# Patient Record
Sex: FEMALE | Race: BLACK OR AFRICAN AMERICAN | Employment: FULL TIME | URBAN - METROPOLITAN AREA
[De-identification: names, ages, dates, MRNs, and addresses within clinical notes are randomized per-mention and may not be internally consistent; named-entity substitution may affect disease eponyms.]

---

## 2017-02-14 ENCOUNTER — OFFICE VISIT (OUTPATIENT)
Dept: NEUROLOGY | Age: 42
End: 2017-02-14

## 2017-02-14 VITALS
DIASTOLIC BLOOD PRESSURE: 78 MMHG | BODY MASS INDEX: 23.63 KG/M2 | OXYGEN SATURATION: 98 % | SYSTOLIC BLOOD PRESSURE: 128 MMHG | HEART RATE: 108 BPM | HEIGHT: 62 IN | WEIGHT: 128.4 LBS

## 2017-02-14 DIAGNOSIS — G35 MULTIPLE SCLEROSIS (HCC): Primary | ICD-10-CM

## 2017-02-14 DIAGNOSIS — R29.898 RIGHT LEG WEAKNESS: ICD-10-CM

## 2017-02-14 NOTE — PROGRESS NOTES
Date:             2017    Name:  Magnus Medel  :  1975  MRN:  [id number]     PCP:  Carmine Lott NP    Chief Complaint   Patient presents with    Follow-up     MS         HISTORY OF PRESENT ILLNESS:  Zakia Villarreal is a 39 y.o., female who presents today for follow up for MS. She had a bad reaction to copaxone, which was the first DMT that she tried. She is now on rebif, and is really happy with that. No injection pain, she has a little tingle about 5 minutes later but that's it. No systemic side effects to speak off. She feels like she has noticed a change in her ability to walk, she is no longer excessively fatigued. Weakness in her right leg has stabilized, this had started to hurt when walking but she is noticing that less now. Her eyesight has changed, she is due to have an eye exam on Monday. She has some diplopia. She has started doing yoga at home, [employer] sent her a DVD with yoga on ever people with MS. Overall, her mood is good but she did snap at her dad recently. She has started meditating, has a friend at work who is helping her with that.    2016 recap  Zakia Villarreal is a 39 y.o., female who presents today for follow up for MS. She initially considered taking a PO medicine, but is not comfortable with the PML risk. She is comfortable now starting copaxone. She is not enthusiastic about the injections, but thinks that she is ready. Does still feel like she has a glove on the left side of her body. This has been present since her first MS attack in her 25s, no MRI was done at that time. She had an EMG only. Returned when she developed right leg weakness, worst when she is tired. She does make sure to rest when she needs to. She does feel like she tends to head to the left when she walks. Did start falling last year, which is why she pursued further workup. Did have a severe HA after her LP, after she got a blood patch she felt immediately better.  Denies visual changes, but thinks she might need new glasses. Current Outpatient Prescriptions   Medication Sig    interferon beta-1a, albumin, (REBIF, WITH ALBUMIN,) 44 mcg/0.5 mL injection by SubCUTAneous route every Monday, Wednesday, Friday.  norethindrone-ethinyl estradiol-iron (JUNEL FE 1.5/30, 28,) 1.5 mg-30 mcg (21)/75 mg (7) tablet Take 1 Tab by mouth daily.  LORATADINE (CLARITIN PO) Take 10 mg by mouth as needed. No current facility-administered medications for this visit. No Known Allergies  Past Medical History   Diagnosis Date    Ill-defined condition      uterine fibroids    Other ill-defined conditions(799.89)      sleep disorder and breast fibroids    Other ill-defined conditions(799.89)      left arm numbness at times    Unspecified breast disorder      Past Surgical History   Procedure Laterality Date    Hx heent       tooth extraction    Pr abdomen surgery proc unlisted       myomectomy    Hx gyn       fibroids    Hx other surgical  2015     laparoscopoy      Social History     Social History    Marital status: SINGLE     Spouse name: N/A    Number of children: N/A    Years of education: N/A     Occupational History    Not on file.      Social History Main Topics    Smoking status: Never Smoker    Smokeless tobacco: Never Used    Alcohol use Yes      Comment: occasionally    Drug use: Yes     Special: Marijuana      Comment: \"hasn't been this week\"    Sexual activity: Not on file     Other Topics Concern    Not on file     Social History Narrative     Family History   Problem Relation Age of Onset    Bleeding Prob Mother     Endometriosis Mother     Cancer Maternal Grandmother      lung    Hypertension Maternal Grandfather     Stroke Maternal Grandfather          PHYSICAL EXAMINATION:    Visit Vitals    /78    Pulse (!) 108    Ht 5' 2\" (1.575 m)    Wt 128 lb 6.4 oz (58.2 kg)    SpO2 98%    BMI 23.48 kg/m2     General:  Well defined, nourished, and groomed individual in no acute distress. Neck: Supple, nontender, no bruits, no pain with resistance to active range of motion. Heart: Regular rate and rhythm, no murmurs, rub, or gallop. Normal S1S2. Lungs:  Clear to auscultation bilaterally with equal chest expansion, no cough, no wheeze  Musculoskeletal:  Extremities revealed no edema and had full range of motion of joints. Psych:  Good mood and bright affect    NEUROLOGICAL EXAMINATION:     Mental Status:   Alert and oriented to person, place, and time with recent and remote memory intact. Attention span and concentration are normal. Speech is fluent with a full fund of knowledge. Cranial Nerves:    II, III, IV, VI:  Visual acuity grossly intact. Pupils are equal, round, and reactive to light. Extra-ocular movements are full and fluid. No ptosis or nystagmus. V-XII: Hearing is grossly intact. Facial features are symmetric, with normal sensation and strength. The palate rises symmetrically and the tongue protrudes midline. Sternocleidomastoids 5/5. Motor Examination: Normal tone, bulk, and strength, 5/5 muscle strength throughout (right leg slightly weaker than left). Coordination:  Finger to nose testing was normal.   No resting or intention tremor  Gait and Station:  Steady while walking and with tandem walking. Normal arm swing. No pronator drift. No muscle wasting or fasciculations noted. ASSESSMENT AND PLAN    ICD-10-CM ICD-9-CM    1. Multiple sclerosis ([facility] G35 340    2. Right leg weakness M62.81 729.89      70-year-old female seen in follow-up for MS. She recently initiated DMT. First try Copaxone, had terrible reactions to that. She is now using reactive, is tolerating that very well. She has a little bit of tingling a few minutes after the injection, but no systemic reaction. Labs last checked the beginning of January were within normal limits.   She is doing yoga, meditation, think about getting an exercise bike to improve her strength and her balance. Has a little bit of right leg weakness, this does not affect her ability to walk. Also has some diplopia and has a plan to get an eye exam on Monday. She thinks that she sees an ophthalmologist for that. 1.  Continue rebif 44 mcg M, W, F  2.  Patient will return in 6 months, discussed that she should get repeat imaging between 6 months and 1 year after initiating Rebif to evaluate for effectiveness. Will also be due for CMP P and CBC at that time  3. Advised her to continue working on balance, strength with her exercise. Patient is also doing a great job of maintaining hydration, should continue that.   4.  Eye exam is normal and she continues to have diplopia, will request records and send her to an ophthalmologist if she has not seen one    Follow-up in 6 months, patient would like to establish care with Dr. Wilhemenia Schlatter NP

## 2017-02-14 NOTE — MR AVS SNAPSHOT
Visit Information Date & Time Provider Department Dept. Phone Encounter #  
 2/14/2017  8:00 AM Ashlyn Delacruz NP [facility] [phone] [id number] Follow-up Instructions Return in about 6 months (around 8/14/2017). Your Appointments 8/14/2017  9:40 AM  
Follow Up with Franko Naik MD  
[facility] [address]) Appt Note: FU, MS,CP $40,adt,2/14/2017  
 [facility], 
[address], [address] [address]  
[address], [address], [address] [address] Upcoming Health Maintenance Date Due DTaP/Tdap/Td series (1 - Tdap) 8/22/1996 PAP AKA CERVICAL CYTOLOGY 8/22/1996 INFLUENZA AGE 9 TO ADULT 8/1/2016 Allergies as of 2/14/2017  Review Complete On: 2/14/2017 By: Sharona Palacios No Known Allergies Current Immunizations  Never Reviewed No immunizations on file. Not reviewed this visit You Were Diagnosed With   
  
 Codes Comments Multiple sclerosis ([facility]    -  Primary ICD-10-CM: G35 
ICD-9-CM: 592 Right leg weakness     ICD-10-CM: M62.81 ICD-9-CM: 729.89 Vitals BP Pulse Height(growth percentile) Weight(growth percentile) LMP SpO2  
 128/78 (!) 108 5' 2\" (1.575 m) 128 lb 6.4 oz (58.2 kg) 02/06/2017 98% BMI OB Status Smoking Status 23.48 kg/m2 Having regular periods Never Smoker BMI and BSA Data Body Mass Index Body Surface Area  
 23.48 kg/m 2 1.6 m 2 Preferred Pharmacy Pharmacy Name Phone [id number] [place], [address] Your Updated Medication List  
  
   
This list is accurate as of: 2/14/17  8:31 AM.  Always use your most recent med list.  
  
  
  
  
 Harshal Dano Take 10 mg by mouth as needed. JUNEL FE 1.5/30 (28) 1.5 mg-30 mcg (21)/75 mg (7) Tab Generic drug:  norethindrone-ethinyl estradiol-iron Take 1 Tab by mouth daily. REBIF (WITH ALBUMIN) 44 mcg/0.5 mL injection Generic drug:  interferon beta-1a (albumin) by SubCUTAneous route every Monday, Wednesday, Friday. Follow-up Instructions Return in about 6 months (around 8/14/2017). Patient Instructions A Healthy Lifestyle: Care Instructions Your Care Instructions A healthy lifestyle can help you feel good, stay at a healthy weight, and have plenty of energy for both work and play. A healthy lifestyle is something you can share with your whole family. A healthy lifestyle also can lower your risk for serious health problems, such as high blood pressure, heart disease, and diabetes. You can follow a few steps listed below to improve your health and the health of your family. Follow-up care is a key part of your treatment and safety. Be sure to make and go to all appointments, and call your doctor if you are having problems. Its also a good idea to know your test results and keep a list of the medicines you take. How can you care for yourself at home? · Do not eat too much sugar, fat, or fast foods. You can still have dessert and treats now and then. The goal is moderation. · Start small to improve your eating habits. Pay attention to portion sizes, drink less juice and soda pop, and eat more fruits and vegetables. ¨ Eat a healthy amount of food. A 3-ounce serving of meat, for example, is about the size of a deck of cards. Fill the rest of your plate with vegetables and whole grains. ¨ Limit the amount of soda and sports drinks you have every day. Drink more water when you are thirsty. ¨ Eat at least 5 servings of fruits and vegetables every day. It may seem like a lot, but it is not hard to reach this goal. A serving or helping is 1 piece of fruit, 1 cup of vegetables, or 2 cups of leafy, raw vegetables. Have an apple or some carrot sticks as an afternoon snack instead of a candy bar. Try to have fruits and/or vegetables at every meal. 
· Make exercise part of your daily routine. You may want to start with simple activities, such as walking, bicycling, or slow swimming. Try to be active 30 to 60 minutes every day. You do not need to do all 30 to 60 minutes all at once. For example, you can exercise 3 times a day for 10 or 20 minutes. Moderate exercise is safe for most people, but it is always a good idea to talk to your doctor before starting an exercise program. 
· Keep moving. Gomez Mathews the lawn, work in the garden, or [place]. Take the stairs instead of the elevator at work. · If you smoke, quit. People who smoke have an increased risk for heart attack, stroke, cancer, and other lung illnesses. Quitting is hard, but there are ways to boost your chance of quitting tobacco for good. ¨ Use nicotine gum, patches, or lozenges. ¨ Ask your doctor about stop-smoking programs and medicines. ¨ Keep trying. In addition to reducing your risk of diseases in the future, you will notice some benefits soon after you stop using tobacco. If you have shortness of breath or asthma symptoms, they will likely get better within a few weeks after you quit. · Limit how much alcohol you drink. Moderate amounts of alcohol (up to 2 drinks a day for men, 1 drink a day for women) are okay. But drinking too much can lead to liver problems, high blood pressure, and other health problems. Family health If you have a family, there are many things you can do together to improve your health. · Eat meals together as a family as often as possible. · Eat healthy foods. This includes fruits, vegetables, lean meats and dairy, and whole grains. · Include your family in your fitness plan.  Most people think of activities such as jogging or tennis as the way to fitness, but there are many ways you and your family can be more active. Anything that makes you breathe hard and gets your heart pumping is exercise. Here are some tips: 
¨ Walk to do errands or to take your child to school or the bus. ¨ Go for a family bike ride after dinner instead of watching TV. Where can you learn more? Go to http://juan jose-rolf.info/. Enter E893 in the search box to learn more about \"A Healthy Lifestyle: Care Instructions. \" Current as of: July 26, 2016 Content Version: 11.1 © 7177-1080 [employer]. Care instructions adapted under license by [employer] (which disclaims liability or warranty for this information). If you have questions about a medical condition or this instruction, always ask your healthcare professional. [address] any warranty or liability for your use of this information. Introducing [facility] & HEALTH SERVICES! Dear Tisha Alvarado: Thank you for requesting a [facility] account. Our records indicate that you already have an active [facility] account. You can access your account anytime at [email or web address]://[email or web address]. [employer] Did you know that you can access your hospital and ER discharge instructions at any time in [facility]? You can also review all of your test results from your hospital stay or ER visit. Additional Information If you have questions, please visit the Frequently Asked Questions section of the [facility] website at [email or web address]://[email or web address]. [employer]/. Remember, [facility] is NOT to be used for urgent needs. For medical emergencies, dial 911. Now available from your iPhone and Android! Please provide this summary of care documentation to your next provider. Your primary care clinician is listed as Inge Lott. If you have any questions after today's visit, please call [phone].

## 2017-02-14 NOTE — PATIENT INSTRUCTIONS

## 2017-04-12 ENCOUNTER — DOCUMENTATION ONLY (OUTPATIENT)
Dept: NEUROLOGY | Age: 42
End: 2017-04-12

## 2017-04-12 ENCOUNTER — TELEPHONE (OUTPATIENT)
Dept: NEUROLOGY | Age: 42
End: 2017-04-12

## 2017-07-11 ENCOUNTER — HOSPITAL ENCOUNTER (OUTPATIENT)
Dept: MAMMOGRAPHY | Age: 42
Discharge: HOME | End: 2017-07-11
Attending: OBSTETRICS & GYNECOLOGY
Payer: COMMERCIAL

## 2017-07-11 DIAGNOSIS — Z12.31 VISIT FOR SCREENING MAMMOGRAM: ICD-10-CM

## 2017-07-11 PROCEDURE — 77063 BREAST TOMOSYNTHESIS BI: CPT

## 2017-08-14 ENCOUNTER — OFFICE VISIT (OUTPATIENT)
Dept: NEUROLOGY | Age: 42
End: 2017-08-14

## 2017-08-14 VITALS
SYSTOLIC BLOOD PRESSURE: 110 MMHG | OXYGEN SATURATION: 99 % | BODY MASS INDEX: 23.96 KG/M2 | WEIGHT: 130.2 LBS | DIASTOLIC BLOOD PRESSURE: 72 MMHG | HEIGHT: 62 IN | HEART RATE: 81 BPM

## 2017-08-14 DIAGNOSIS — G35 MULTIPLE SCLEROSIS (HCC): Primary | ICD-10-CM

## 2017-08-14 DIAGNOSIS — R20.0 NUMBNESS ON LEFT SIDE: ICD-10-CM

## 2017-08-14 DIAGNOSIS — R29.898 RIGHT LEG WEAKNESS: ICD-10-CM

## 2017-08-14 RX ORDER — ERGOCALCIFEROL 1.25 MG/1
50000 CAPSULE ORAL
COMMUNITY
End: 2019-06-03 | Stop reason: ALTCHOICE

## 2017-08-14 NOTE — LETTER
8/14/2017 10:26 AM 
 
Patient:    Wiley Nobles YOB: 1975 Date of Visit:    8/14/2017 Dear Geovanny Escamilla MD 
 
Thank you for referring Ms. Beto Gutierrez to me for evaluation/treatment. Below are the relevant portions of my assessment and plan of care. Neurology follow-up note 08/14/17 Chief Complaint Patient presents with  Follow-up MS NEGRITO Nobles is a 39 y.o. female who presented to the neurology office for management of multiple sclerosis. At the age of 29 she did have left-sided numbness that had been improving and now she does have decreased sensation in her left hand. She was diagnosed with multiple sclerosis in November 2016 as at that time she did have sudden onset right leg weakness and it gets weak and fatigued. In January she did have diplopia. All the symptoms are better now. She initially tried Copaxone for 2 weeks but had hot flashes and was feeling sick and hence was switched over to Rebif December/January and has been doing better. No recent exacerbations. Current Outpatient Prescriptions Medication Sig  ergocalciferol (VITAMIN D2) 50,000 unit capsule Take 50,000 Units by mouth.  interferon beta-1a, albumin, (REBIF, WITH ALBUMIN,) 44 mcg/0.5 mL injection by SubCUTAneous route every Monday, Wednesday, Friday.  norethindrone-ethinyl estradiol-iron (JUNEL FE 1.5/30, 28,) 1.5 mg-30 mcg (21)/75 mg (7) tablet Take 1 Tab by mouth daily.  LORATADINE (CLARITIN PO) Take 10 mg by mouth as needed. No current facility-administered medications for this visit. Past Medical History:  
Diagnosis Date  Ill-defined condition   
 uterine fibroids  Other ill-defined conditions   
 sleep disorder and breast fibroids  Other ill-defined conditions   
 left arm numbness at times  Unspecified breast disorder Past Surgical History:  
Procedure Laterality Date  ABDOMEN SURGERY PROC UNLISTED    
 myomectomy  HX GYN    
 fibroids  HX HEENT    
 tooth extraction  HX OTHER SURGICAL  2015  
 laparoscopoy Family History Problem Relation Age of Onset  Bleeding Prob Mother  Endometriosis Mother  Cancer Maternal Grandmother   
  lung  Hypertension Maternal Grandfather  Stroke Maternal Grandfather Social History Substance Use Topics  Smoking status: Never Smoker  Smokeless tobacco: Never Used  Alcohol use Yes Comment: occasionally REVIEW OF SYSTEMS:  
A ten system review of constitutional, cardiovascular, respiratory, musculoskeletal, endocrine, skin, SHEENT, genitourinary, psychiatric and neurologic systems was obtained and is unremarkable except left-sided numbness and right leg weakness EXAMINATION:  
Visit Vitals  /72  Pulse 81  
 Ht 5' 2\" (1.575 m)  Wt 130 lb 3.2 oz (59.1 kg)  SpO2 99%  BMI 23.81 kg/m2 General:  
General appearance: Pt is in no acute distress Distal pulses are preserved Neurological Examination:  
Mental Status: AAO x3. Speech is fluent. Follows commands, has normal fund of knowledge, attention, short term recall, comprehension and insight. Cranial Nerves: Visual fields are full. PERRL, Extraocular movements are full. Facial sensation intact V1- V3. Facial movement intact, symmetric. Hearing intact to conversation. Palate elevates symmetrically. Shoulder shrug symmetric. Tongue midline. Motor: Strength is 5/5 in all 4 ext. Sensation: Decreased pinprick sensation distally in the left upper and lower extremities Coordination/Cerebellar: Intact to finger-nose-finger Skin: No significant bruising or lacerations. Laboratory review:  
Results for orders placed or performed in visit on 12/23/16 CBC WITH AUTOMATED DIFF Result Value Ref Range WBC 5.9 3.4 - 10.8 x10E3/uL  
 RBC 4.32 3.77 - 5.28 x10E6/uL HGB 13.5 11.1 - 15.9 g/dL HCT 40.9 34.0 - 46.6 %  MCV 95 79 - 97 fL  
 MCH 31.3 26.6 - 33.0 pg  
 MCHC 33.0 31.5 - 35.7 g/dL  
 RDW 13.4 12.3 - 15.4 % PLATELET 907 932 - 644 x10E3/uL NEUTROPHILS 53 % Lymphocytes 37 % MONOCYTES 8 % EOSINOPHILS 1 % BASOPHILS 1 %  
 ABS. NEUTROPHILS 3.2 1.4 - 7.0 x10E3/uL Abs Lymphocytes 2.2 0.7 - 3.1 x10E3/uL  
 ABS. MONOCYTES 0.5 0.1 - 0.9 x10E3/uL  
 ABS. EOSINOPHILS 0.1 0.0 - 0.4 x10E3/uL  
 ABS. BASOPHILS 0.0 0.0 - 0.2 x10E3/uL IMMATURE GRANULOCYTES 0 %  
 ABS. IMM. GRANS. 0.0 0.0 - 0.1 x10E3/uL METABOLIC PANEL, COMPREHENSIVE Result Value Ref Range Glucose 79 65 - 99 mg/dL BUN 14 6 - 24 mg/dL Creatinine 0.64 0.57 - 1.00 mg/dL GFR est non- >59 mL/min/1.73 GFR est  >59 mL/min/1.73  
 BUN/Creatinine ratio 22 9 - 23 Sodium 142 134 - 144 mmol/L Potassium 4.8 3.5 - 5.2 mmol/L Chloride 101 96 - 106 mmol/L  
 CO2 27 18 - 29 mmol/L Calcium 9.7 8.7 - 10.2 mg/dL Protein, total 7.4 6.0 - 8.5 g/dL Albumin 4.3 3.5 - 5.5 g/dL GLOBULIN, TOTAL 3.1 1.5 - 4.5 g/dL A-G Ratio 1.4 1.1 - 2.5 Bilirubin, total 0.6 0.0 - 1.2 mg/dL Alk. phosphatase 55 39 - 117 IU/L  
 AST (SGOT) 15 0 - 40 IU/L  
 ALT (SGPT) 11 0 - 32 IU/L Imaging review: 
10/4/2016 MRI thoracic spine with and without contrast 
Multifocal thoracic cord signal alteration without evidence for cord enhancement. Lower cervical spine degenerative disc disease. No thoracic spine degenerative disc disease or facet arthrosis. 7/13/2016 MRI cervical spine with and without contrast 
Abnormal T2 signal in the normal caliber cervical spinal cord spanning C1 through C7 and is compatible with provided diagnosis of chronic demyelinating disease. No MRI evidence of active demyelination. Documentation review: I reviewed the notes from Jeff Kellogg from 2/14/2017. Patient is taking ibuprofen at this point of time and will continue the same.   The patient will return in 6 months and will discuss repeat imaging between 6 months and a year after initiating Requip to evaluate for effectiveness. Advised to continue on work, strength and exercise. Assessment/Plan:  
Rad Gomez is a 39 y.o. female who presented to the neurology office for management of multiple sclerosis. The patient is doing well on treatment that she started around January of this year. The patient was on Copaxone before but had a reaction. No new exacerbation. Will get blood work today and we will get imaging study of the brain and spine to look for any progression. 1.  Blood work today 2. MRI brain, cervical and thoracic spine 3. Follow-up in 3 months ICD-10-CM ICD-9-CM 1. Multiple sclerosis ([address] G35 340 MRI BRAIN W WO CONT  
   MRI CERV SPINE W WO CONT  
   MRI [facility] SPINE W WO CONT  
   CBC WITH AUTOMATED DIFF  
   METABOLIC PANEL, COMPREHENSIVE 2. Right leg weakness R29.898 729.89   
3. Numbness on left side R20.0 782.0 Brittany Rolon MD 
Neurologist 
 
CC: Ailyn Garrison MD 
Fax: [phone] This note was created using voice recognition software. Despite editing, there may be syntax errors. This note will not be viewable in [address] If you have questions, please do not hesitate to call me. I look forward to following MsBashir Katerina Weiss along with you. Sincerely, Brittany Rolon MD

## 2017-08-14 NOTE — PATIENT INSTRUCTIONS
1.  Blood work today  2. MRI brain, cervical and thoracic spine  3. Follow-up in 3 months     A Healthy Lifestyle: Care Instructions  Your Care Instructions  A healthy lifestyle can help you feel good, stay at a healthy weight, and have plenty of energy for both work and play. A healthy lifestyle is something you can share with your whole family. A healthy lifestyle also can lower your risk for serious health problems, such as high blood pressure, heart disease, and diabetes. You can follow a few steps listed below to improve your health and the health of your family. Follow-up care is a key part of your treatment and safety. Be sure to make and go to all appointments, and call your doctor if you are having problems. Its also a good idea to know your test results and keep a list of the medicines you take. How can you care for yourself at home? · Do not eat too much sugar, fat, or fast foods. You can still have dessert and treats now and then. The goal is moderation. · Start small to improve your eating habits. Pay attention to portion sizes, drink less juice and soda pop, and eat more fruits and vegetables. ¨ Eat a healthy amount of food. A 3-ounce serving of meat, for example, is about the size of a deck of cards. Fill the rest of your plate with vegetables and whole grains. ¨ Limit the amount of soda and sports drinks you have every day. Drink more water when you are thirsty. ¨ Eat at least 5 servings of fruits and vegetables every day. It may seem like a lot, but it is not hard to reach this goal. A serving or helping is 1 piece of fruit, 1 cup of vegetables, or 2 cups of leafy, raw vegetables. Have an apple or some carrot sticks as an afternoon snack instead of a candy bar. Try to have fruits and/or vegetables at every meal.  · Make exercise part of your daily routine. You may want to start with simple activities, such as walking, bicycling, or slow swimming.  Try to be active 30 to 60 minutes every day. You do not need to do all 30 to 60 minutes all at once. For example, you can exercise 3 times a day for 10 or 20 minutes. Moderate exercise is safe for most people, but it is always a good idea to talk to your doctor before starting an exercise program.  · Keep moving. [employer] the lawn, work in the garden, or [employer]. Take the stairs instead of the elevator at work. · If you smoke, quit. People who smoke have an increased risk for heart attack, stroke, cancer, and other lung illnesses. Quitting is hard, but there are ways to boost your chance of quitting tobacco for good. ¨ Use nicotine gum, patches, or lozenges. ¨ Ask your doctor about stop-smoking programs and medicines. ¨ Keep trying. In addition to reducing your risk of diseases in the future, you will notice some benefits soon after you stop using tobacco. If you have shortness of breath or asthma symptoms, they will likely get better within a few weeks after you quit. · Limit how much alcohol you drink. Moderate amounts of alcohol (up to 2 drinks a day for men, 1 drink a day for women) are okay. But drinking too much can lead to liver problems, high blood pressure, and other health problems. Family health  If you have a family, there are many things you can do together to improve your health. · Eat meals together as a family as often as possible. · Eat healthy foods. This includes fruits, vegetables, lean meats and dairy, and whole grains. · Include your family in your fitness plan. Most people think of activities such as jogging or tennis as the way to fitness, but there are many ways you and your family can be more active. Anything that makes you breathe hard and gets your heart pumping is exercise. Here are some tips:  ¨ Walk to do errands or to take your child to school or the bus. ¨ Go for a family bike ride after dinner instead of watching TV. Where can you learn more? Go to http://juan jose-rolf.info/.   Enter C009 in the search box to learn more about \"A Healthy Lifestyle: Care Instructions. \"  Current as of: July 26, 2016  Content Version: 11.3  © 9427-0572 [employer], [email or web address]. Care instructions adapted under license by [employer] (which disclaims liability or warranty for this information). If you have questions about a medical condition or this instruction, always ask your healthcare professional. [place] [address] any warranty or liability for your use of this information.

## 2017-08-14 NOTE — PROGRESS NOTES
Neurology follow-up note    08/14/17    Chief Complaint   Patient presents with   Jamshid Kendall is a 39 y.o. female who presented to the neurology office for management of multiple sclerosis. At the age of 29 she did have left-sided numbness that had been improving and now she does have decreased sensation in her left hand. She was diagnosed with multiple sclerosis in November 2016 as at that time she did have sudden onset right leg weakness and it gets weak and fatigued. In January she did have diplopia. All the symptoms are better now. She initially tried Copaxone for 2 weeks but had hot flashes and was feeling sick and hence was switched over to Rebif December/January and has been doing better. No recent exacerbations. Current Outpatient Prescriptions   Medication Sig    ergocalciferol (VITAMIN D2) 50,000 unit capsule Take 50,000 Units by mouth.  interferon beta-1a, albumin, (REBIF, WITH ALBUMIN,) 44 mcg/0.5 mL injection by SubCUTAneous route every Monday, Wednesday, Friday.  norethindrone-ethinyl estradiol-iron (JUNEL FE 1.5/30, 28,) 1.5 mg-30 mcg (21)/75 mg (7) tablet Take 1 Tab by mouth daily.  LORATADINE (CLARITIN PO) Take 10 mg by mouth as needed. No current facility-administered medications for this visit.         Past Medical History:   Diagnosis Date    Ill-defined condition     uterine fibroids    Other ill-defined conditions     sleep disorder and breast fibroids    Other ill-defined conditions     left arm numbness at times    Unspecified breast disorder      Past Surgical History:   Procedure Laterality Date    ABDOMEN SURGERY PROC UNLISTED      myomectomy    HX GYN      fibroids    HX HEENT      tooth extraction    HX OTHER SURGICAL  2015    laparoscopoy      Family History   Problem Relation Age of Onset    Bleeding Prob Mother     Endometriosis Mother     Cancer Maternal Grandmother      lung    Hypertension Maternal Grandfather     Stroke Maternal Grandfather      Social History   Substance Use Topics    Smoking status: Never Smoker    Smokeless tobacco: Never Used    Alcohol use Yes      Comment: occasionally       REVIEW OF SYSTEMS:   A ten system review of constitutional, cardiovascular, respiratory, musculoskeletal, endocrine, skin, SHEENT, genitourinary, psychiatric and neurologic systems was obtained and is unremarkable except left-sided numbness and right leg weakness    EXAMINATION:   Visit Vitals    /72    Pulse 81    Ht 5' 2\" (1.575 m)    Wt 130 lb 3.2 oz (59.1 kg)    SpO2 99%    BMI 23.81 kg/m2        General:   General appearance: Pt is in no acute distress   Distal pulses are preserved    Neurological Examination:   Mental Status: AAO x3. Speech is fluent. Follows commands, has normal fund of knowledge, attention, short term recall, comprehension and insight. Cranial Nerves: Visual fields are full. PERRL, Extraocular movements are full. Facial sensation intact V1- V3. Facial movement intact, symmetric. Hearing intact to conversation. Palate elevates symmetrically. Shoulder shrug symmetric. Tongue midline. Motor: Strength is 5/5 in all 4 ext. Sensation: Decreased pinprick sensation distally in the left upper and lower extremities    Coordination/Cerebellar: Intact to finger-nose-finger     Skin: No significant bruising or lacerations. Laboratory review:   Results for orders placed or performed in visit on 12/23/16   CBC WITH AUTOMATED DIFF   Result Value Ref Range    WBC 5.9 3.4 - 10.8 x10E3/uL    RBC 4.32 3.77 - 5.28 x10E6/uL    HGB 13.5 11.1 - 15.9 g/dL    HCT 40.9 34.0 - 46.6 %    MCV 95 79 - 97 fL    MCH 31.3 26.6 - 33.0 pg    MCHC 33.0 31.5 - 35.7 g/dL    RDW 13.4 12.3 - 15.4 %    PLATELET 038 455 - 065 x10E3/uL    NEUTROPHILS 53 %    Lymphocytes 37 %    MONOCYTES 8 %    EOSINOPHILS 1 %    BASOPHILS 1 %    ABS.  NEUTROPHILS 3.2 1.4 - 7.0 x10E3/uL    Abs Lymphocytes 2.2 0.7 - 3.1 x10E3/uL    ABS. MONOCYTES 0.5 0.1 - 0.9 x10E3/uL    ABS. EOSINOPHILS 0.1 0.0 - 0.4 x10E3/uL    ABS. BASOPHILS 0.0 0.0 - 0.2 x10E3/uL    IMMATURE GRANULOCYTES 0 %    ABS. IMM. GRANS. 0.0 0.0 - 0.1 [id number]   METABOLIC PANEL, COMPREHENSIVE   Result Value Ref Range    Glucose 79 65 - 99 mg/dL    BUN 14 6 - 24 mg/dL    Creatinine 0.64 0.57 - 1.00 mg/dL    GFR est non- >59 mL/min/1.73    GFR est  >59 mL/min/1.73    BUN/Creatinine ratio 22 9 - 23    Sodium 142 134 - 144 mmol/L    Potassium 4.8 3.5 - 5.2 mmol/L    Chloride 101 96 - 106 mmol/L    CO2 27 18 - 29 mmol/L    Calcium 9.7 8.7 - 10.2 mg/dL    Protein, total 7.4 6.0 - 8.5 g/dL    Albumin 4.3 3.5 - 5.5 g/dL    GLOBULIN, TOTAL 3.1 1.5 - 4.5 g/dL    A-G Ratio 1.4 1.1 - 2.5    Bilirubin, total 0.6 0.0 - 1.2 mg/dL    Alk. phosphatase 55 39 - 117 IU/L    AST (SGOT) 15 0 - 40 IU/L    ALT (SGPT) 11 0 - 32 IU/L       Imaging review:  10/4/2016  MRI thoracic spine with and without contrast  Multifocal thoracic cord signal alteration without evidence for cord enhancement. Lower cervical spine degenerative disc disease. No thoracic spine degenerative disc disease or facet arthrosis. 7/13/2016  MRI cervical spine with and without contrast  Abnormal T2 signal in the normal caliber cervical spinal cord spanning C1 through C7 and is compatible with provided diagnosis of chronic demyelinating disease. No MRI evidence of active demyelination. Documentation review:  I reviewed the notes from Angus Kendall from 2/14/2017. Patient is taking ibuprofen at this point of time and will continue the same. The patient will return in 6 months and will discuss repeat imaging between 6 months and a year after initiating Requip to evaluate for effectiveness. Advised to continue on work, strength and exercise. Assessment/Plan:   Adriana Perez is a 39 y.o. female who presented to the neurology office for management of multiple sclerosis.   The patient is doing well on treatment that she started around January of this year. The patient was on Copaxone before but had a reaction. No new exacerbation. Will get blood work today and we will get imaging study of the brain and spine to look for any progression. 1.  Blood work today  2. MRI brain, cervical and thoracic spine  3. Follow-up in 3 months      ICD-10-CM ICD-9-CM    1. Multiple sclerosis (HCC) G35 340 MRI BRAIN W WO CONT      MRI CERV SPINE W WO CONT      MRI [facility] SPINE W WO CONT      CBC WITH AUTOMATED DIFF      METABOLIC PANEL, COMPREHENSIVE   2. Right leg weakness R29.898 729.89    3. Numbness on left side R20.0 782.0         Pam Simon MD  Neurologist    CC: Jairo Logan MD  Fax: [phone]    This note was created using voice recognition software. Despite editing, there may be syntax errors. This note will not be viewable in [address]

## 2017-08-14 NOTE — MR AVS SNAPSHOT
Visit Information Date & Time Provider Department Dept. Phone Encounter #  
 8/14/2017  9:40 AM Venu Rea MD [facility] [phone] [id number] Upcoming Health Maintenance Date Due DTaP/Tdap/Td series (1 - Tdap) 8/22/1996 PAP AKA CERVICAL CYTOLOGY 8/22/1996 INFLUENZA AGE 9 TO ADULT 8/1/2017 Allergies as of 8/14/2017  Review Complete On: 8/14/2017 By: Venu Rea MD  
 No Known Allergies Current Immunizations  Never Reviewed No immunizations on file. Not reviewed this visit You Were Diagnosed With   
  
 Codes Comments [facility]    -  Primary ICD-10-CM: G35 
ICD-9-CM: 099 Right leg weakness     ICD-10-CM: R29.898 ICD-9-CM: 729.89 Numbness on left side     ICD-10-CM: R20.0 ICD-9-CM: 394. 0 Vitals BP Pulse Height(growth percentile) Weight(growth percentile) SpO2 BMI  
 110/72 81 5' 2\" (1.575 m) 130 lb 3.2 oz (59.1 kg) 99% 23.81 kg/m2 OB Status Smoking Status Having regular periods Never Smoker BMI and BSA Data Body Mass Index Body Surface Area  
 23.81 kg/m 2 1.61 m 2 Preferred Pharmacy Pharmacy Name Phone [id number] [place] [address] Your Updated Medication List  
  
   
This list is accurate as of: 8/14/17 10:21 AM.  Always use your most recent med list.  
  
  
  
  
 Chrystine Prier Take 10 mg by mouth as needed. JUNEL FE 1.5/30 (28) 1.5 mg-30 mcg (21)/75 mg (7) Tab Generic drug:  norethindrone-ethinyl estradiol-iron Take 1 Tab by mouth daily. REBIF (WITH ALBUMIN) 44 mcg/0.5 mL injection Generic drug:  interferon beta-1a (albumin) by SubCUTAneous route every Monday, Wednesday, Friday. VITAMIN D2 50,000 unit capsule Generic drug:  ergocalciferol Take 50,000 Units by mouth. We Performed the Following CBC WITH AUTOMATED DIFF [28498 CPT(R)] METABOLIC PANEL, COMPREHENSIVE [60853 CPT(R)] To-Do List   
 08/14/2017 Imaging:  MRI BRAIN W WO CONT   
  
 08/14/2017 Imaging:  MRI CERV SPINE W WO CONT   
  
 08/14/2017 Imaging:  MRI [facility] SPINE W WO CONT Patient Instructions 1. Blood work today 2. MRI brain, cervical and thoracic spine 3. Follow-up in 3 months A Healthy Lifestyle: Care Instructions Your Care Instructions A healthy lifestyle can help you feel good, stay at a healthy weight, and have plenty of energy for both work and play. A healthy lifestyle is something you can share with your whole family. A healthy lifestyle also can lower your risk for serious health problems, such as high blood pressure, heart disease, and diabetes. You can follow a few steps listed below to improve your health and the health of your family. Follow-up care is a key part of your treatment and safety. Be sure to make and go to all appointments, and call your doctor if you are having problems. Its also a good idea to know your test results and keep a list of the medicines you take. How can you care for yourself at home? · Do not eat too much sugar, fat, or fast foods. You can still have dessert and treats now and then. The goal is moderation. · Start small to improve your eating habits. Pay attention to portion sizes, drink less juice and soda pop, and eat more fruits and vegetables. ¨ Eat a healthy amount of food. A 3-ounce serving of meat, for example, is about the size of a deck of cards. Fill the rest of your plate with vegetables and whole grains. ¨ Limit the amount of soda and sports drinks you have every day. Drink more water when you are thirsty. ¨ Eat at least 5 servings of fruits and vegetables every day. It may seem like a lot, but it is not hard to reach this goal. A serving or helping is 1 piece of fruit, 1 cup of vegetables, or 2 cups of leafy, raw vegetables. Have an apple or some carrot sticks as an afternoon snack instead of a candy bar. Try to have fruits and/or vegetables at every meal. 
· Make exercise part of your daily routine. You may want to start with simple activities, such as walking, bicycling, or slow swimming. Try to be active 30 to 60 minutes every day. You do not need to do all 30 to 60 minutes all at once. For example, you can exercise 3 times a day for 10 or 20 minutes. Moderate exercise is safe for most people, but it is always a good idea to talk to your doctor before starting an exercise program. 
· Keep moving. [employer] the lawn, work in the garden, or [employer]. Take the stairs instead of the elevator at work. · If you smoke, quit. People who smoke have an increased risk for heart attack, stroke, cancer, and other lung illnesses. Quitting is hard, but there are ways to boost your chance of quitting tobacco for good. ¨ Use nicotine gum, patches, or lozenges. ¨ Ask your doctor about stop-smoking programs and medicines. ¨ Keep trying. In addition to reducing your risk of diseases in the future, you will notice some benefits soon after you stop using tobacco. If you have shortness of breath or asthma symptoms, they will likely get better within a few weeks after you quit. · Limit how much alcohol you drink. Moderate amounts of alcohol (up to 2 drinks a day for men, 1 drink a day for women) are okay. But drinking too much can lead to liver problems, high blood pressure, and other health problems. Family health If you have a family, there are many things you can do together to improve your health. · Eat meals together as a family as often as possible. · Eat healthy foods. This includes fruits, vegetables, lean meats and dairy, and whole grains. · Include your family in your fitness plan.  Most people think of activities such as jogging or tennis as the way to fitness, but there are many ways you and your family can be more active. Anything that makes you breathe hard and gets your heart pumping is exercise. Here are some tips: 
¨ Walk to do errands or to take your child to school or the bus. ¨ Go for a family bike ride after dinner instead of watching TV. Where can you learn more? Go to http://juan jose-rolf.info/. Enter Z715 in the search box to learn more about \"A Healthy Lifestyle: Care Instructions. \" Current as of: July 26, 2016 Content Version: 11.3 © 0545-5073 [employer]. Care instructions adapted under license by [employer] (which disclaims liability or warranty for this information). If you have questions about a medical condition or this instruction, always ask your healthcare professional. [address] any warranty or liability for your use of this information. Introducing [facility] & HEALTH SERVICES! Dear Sp Coyle: Thank you for requesting a [employer] account. Our records indicate that you already have an active [employer] account. You can access your account anytime at [email or web address] Did you know that you can access your hospital and ER discharge instructions at any time in [employer]? You can also review all of your test results from your hospital stay or ER visit. Additional Information If you have questions, please visit the Frequently Asked Questions section of the [employer] website at [email or web address] Remember, [employer] is NOT to be used for urgent needs. For medical emergencies, dial 911. Now available from your iPhone and Android! Please provide this summary of care documentation to your next provider. Your primary care clinician is listed as Roro Martinez. If you have any questions after today's visit, please call [phone].

## 2017-09-14 ENCOUNTER — HOSPITAL ENCOUNTER (OUTPATIENT)
Dept: MRI IMAGING | Age: 42
Discharge: HOME | End: 2017-09-14
Attending: PSYCHIATRY & NEUROLOGY
Payer: COMMERCIAL

## 2017-09-14 DIAGNOSIS — G35 MULTIPLE SCLEROSIS (HCC): ICD-10-CM

## 2017-09-14 DIAGNOSIS — R90.89 ABNORMAL FINDING ON MRI OF BRAIN: ICD-10-CM

## 2017-09-14 DIAGNOSIS — R93.7 ABNORMAL MRI, CERVICAL SPINE: ICD-10-CM

## 2017-09-14 PROCEDURE — A9576 INJ PROHANCE MULTIPACK: HCPCS | Performed by: PSYCHIATRY & NEUROLOGY

## 2017-09-14 PROCEDURE — 72156 MRI NECK SPINE W/O & W/DYE: CPT

## 2017-09-14 PROCEDURE — 72158 MRI LUMBAR SPINE W/O & W/DYE: CPT

## 2017-09-14 PROCEDURE — 72157 MRI CHEST SPINE W/O & W/DYE: CPT

## 2017-09-14 PROCEDURE — 74011250636 HC RX REV CODE- 250/636: Performed by: PSYCHIATRY & NEUROLOGY

## 2017-09-14 PROCEDURE — 70553 MRI BRAIN STEM W/O & W/DYE: CPT

## 2017-09-14 RX ADMIN — GADOTERIDOL 12 ML: 279.3 INJECTION, SOLUTION INTRAVENOUS at 11:00

## 2017-09-15 ENCOUNTER — TELEPHONE (OUTPATIENT)
Dept: NEUROLOGY | Age: 42
End: 2017-09-15

## 2017-09-15 LAB
ALBUMIN SERPL-MCNC: 4.4 G/DL (ref 3.5–5.5)
ALBUMIN/GLOB SERPL: 1.7 {RATIO} (ref 1.2–2.2)
ALP SERPL-CCNC: 57 IU/L (ref 39–117)
ALT SERPL-CCNC: 19 IU/L (ref 0–32)
AST SERPL-CCNC: 19 IU/L (ref 0–40)
BASOPHILS # BLD AUTO: 0 X10E3/UL (ref 0–0.2)
BASOPHILS NFR BLD AUTO: 0 %
BILIRUB SERPL-MCNC: 0.4 MG/DL (ref 0–1.2)
BUN SERPL-MCNC: 13 MG/DL (ref 6–24)
BUN/CREAT SERPL: 20 (ref 9–23)
CALCIUM SERPL-MCNC: 9.5 MG/DL (ref 8.7–10.2)
CHLORIDE SERPL-SCNC: 100 MMOL/L (ref 96–106)
CO2 SERPL-SCNC: 28 MMOL/L (ref 18–29)
CREAT SERPL-MCNC: 0.66 MG/DL (ref 0.57–1)
EOSINOPHIL # BLD AUTO: 0.1 X10E3/UL (ref 0–0.4)
EOSINOPHIL NFR BLD AUTO: 1 %
ERYTHROCYTE [DISTWIDTH] IN BLOOD BY AUTOMATED COUNT: 14.2 % (ref 12.3–15.4)
GLOBULIN SER CALC-MCNC: 2.6 G/DL (ref 1.5–4.5)
GLUCOSE SERPL-MCNC: 89 MG/DL (ref 65–99)
HCT VFR BLD AUTO: 37.4 % (ref 34–46.6)
HGB BLD-MCNC: 12.5 G/DL (ref 11.1–15.9)
IMM GRANULOCYTES # BLD: 0 X10E3/UL (ref 0–0.1)
IMM GRANULOCYTES NFR BLD: 0 %
LYMPHOCYTES # BLD AUTO: 1.3 X10E3/UL (ref 0.7–3.1)
LYMPHOCYTES NFR BLD AUTO: 28 %
MCH RBC QN AUTO: 31.4 PG (ref 26.6–33)
MCHC RBC AUTO-ENTMCNC: 33.4 G/DL (ref 31.5–35.7)
MCV RBC AUTO: 94 FL (ref 79–97)
MONOCYTES # BLD AUTO: 0.6 X10E3/UL (ref 0.1–0.9)
MONOCYTES NFR BLD AUTO: 14 %
NEUTROPHILS # BLD AUTO: 2.7 X10E3/UL (ref 1.4–7)
NEUTROPHILS NFR BLD AUTO: 57 %
PLATELET # BLD AUTO: 246 X10E3/UL (ref 150–379)
POTASSIUM SERPL-SCNC: 4.3 MMOL/L (ref 3.5–5.2)
PROT SERPL-MCNC: 7 G/DL (ref 6–8.5)
RBC # BLD AUTO: 3.98 X10E6/UL (ref 3.77–5.28)
SODIUM SERPL-SCNC: 141 MMOL/L (ref 134–144)
WBC # BLD AUTO: 4.7 X10E3/UL (ref 3.4–10.8)

## 2017-09-15 NOTE — TELEPHONE ENCOUNTER
Dr. Kandy Gonzalez spoke with Brendon Castillo with Radiology Department and she states the radiologist they are waiting for results from test the patient had at [facility]. That is why the result of the MRI are not finished.

## 2017-11-14 ENCOUNTER — OFFICE VISIT (OUTPATIENT)
Dept: NEUROLOGY | Age: 42
End: 2017-11-14

## 2017-11-14 VITALS
BODY MASS INDEX: 23.34 KG/M2 | SYSTOLIC BLOOD PRESSURE: 108 MMHG | HEIGHT: 62 IN | OXYGEN SATURATION: 99 % | DIASTOLIC BLOOD PRESSURE: 64 MMHG | HEART RATE: 99 BPM | WEIGHT: 126.8 LBS

## 2017-11-14 DIAGNOSIS — G35 MULTIPLE SCLEROSIS (HCC): Primary | ICD-10-CM

## 2017-11-14 NOTE — MR AVS SNAPSHOT
Visit Information Date & Time Provider Department Dept. Phone Encounter #  
 11/14/2017 11:40 AM Jose Benito MD [facility] [phone] [id number] Upcoming Health Maintenance Date Due DTaP/Tdap/Td series (1 - Tdap) 8/22/1996 PAP AKA CERVICAL CYTOLOGY 8/22/1996 Influenza Age 5 to Adult 8/1/2017 Allergies as of 11/14/2017  Review Complete On: 11/14/2017 By: Jose Benito MD  
 No Known Allergies Current Immunizations  Never Reviewed No immunizations on file. Not reviewed this visit You Were Diagnosed With   
  
 Codes Comments Multiple sclerosis ([facility]    -  Primary ICD-10-CM: G35 
ICD-9-CM: 665 Vitals BP Pulse Height(growth percentile) Weight(growth percentile) SpO2 BMI  
 108/64 99 5' 2\" (1.575 m) 126 lb 12.8 oz (57.5 kg) 99% 23.19 kg/m2 OB Status Smoking Status Having regular periods Never Smoker BMI and BSA Data Body Mass Index Body Surface Area  
 23.19 kg/m 2 1.59 m 2 Preferred Pharmacy Pharmacy Name Phone [id number] [place], [place] [address] Your Updated Medication List  
  
   
This list is accurate as of: 11/14/17 11:47 AM.  Always use your most recent med list.  
  
  
  
  
 Dago Wang Take 10 mg by mouth as needed. JUNEL FE 1.5/30 (28) 1.5 mg-30 mcg (21)/75 mg (7) Tab Generic drug:  norethindrone-ethinyl estradiol-iron Take 1 Tab by mouth daily. REBIF (WITH ALBUMIN) 44 mcg/0.5 mL injection Generic drug:  interferon beta-1a (albumin) by SubCUTAneous route every Monday, Wednesday, Friday. VITAMIN D2 50,000 unit capsule Generic drug:  ergocalciferol Take 50,000 Units by mouth. Patient Instructions 1. Follow-up in 6 months A Healthy Lifestyle: Care Instructions Your Care Instructions A healthy lifestyle can help you feel good, stay at a healthy weight, and have plenty of energy for both work and play. A healthy lifestyle is something you can share with your whole family. A healthy lifestyle also can lower your risk for serious health problems, such as high blood pressure, heart disease, and diabetes. You can follow a few steps listed below to improve your health and the health of your family. Follow-up care is a key part of your treatment and safety. Be sure to make and go to all appointments, and call your doctor if you are having problems. It's also a good idea to know your test results and keep a list of the medicines you take. How can you care for yourself at home? · Do not eat too much sugar, fat, or fast foods. You can still have dessert and treats now and then. The goal is moderation. · Start small to improve your eating habits. Pay attention to portion sizes, drink less juice and soda pop, and eat more fruits and vegetables. ¨ Eat a healthy amount of food. A 3-ounce serving of meat, for example, is about the size of a deck of cards. Fill the rest of your plate with vegetables and whole grains. ¨ Limit the amount of soda and sports drinks you have every day. Drink more water when you are thirsty. ¨ Eat at least 5 servings of fruits and vegetables every day. It may seem like a lot, but it is not hard to reach this goal. A serving or helping is 1 piece of fruit, 1 cup of vegetables, or 2 cups of leafy, raw vegetables. Have an apple or some carrot sticks as an afternoon snack instead of a candy bar. Try to have fruits and/or vegetables at every meal. 
· Make exercise part of your daily routine. You may want to start with simple activities, such as walking, bicycling, or slow swimming. Try to be active 30 to 60 minutes every day. You do not need to do all 30 to 60 minutes all at once.  For example, you can exercise 3 times a day for 10 or 20 minutes. Moderate exercise is safe for most people, but it is always a good idea to talk to your doctor before starting an exercise program. 
· Keep moving. Gael Burrs the lawn, work in the garden, or Novavax. Take the stairs instead of the elevator at work. · If you smoke, quit. People who smoke have an increased risk for heart attack, stroke, cancer, and other lung illnesses. Quitting is hard, but there are ways to boost your chance of quitting tobacco for good. ¨ Use nicotine gum, patches, or lozenges. ¨ Ask your doctor about stop-smoking programs and medicines. ¨ Keep trying. In addition to reducing your risk of diseases in the future, you will notice some benefits soon after you stop using tobacco. If you have shortness of breath or asthma symptoms, they will likely get better within a few weeks after you quit. · Limit how much alcohol you drink. Moderate amounts of alcohol (up to 2 drinks a day for men, 1 drink a day for women) are okay. But drinking too much can lead to liver problems, high blood pressure, and other health problems. Family health If you have a family, there are many things you can do together to improve your health. · Eat meals together as a family as often as possible. · Eat healthy foods. This includes fruits, vegetables, lean meats and dairy, and whole grains. · Include your family in your fitness plan. Most people think of activities such as jogging or tennis as the way to fitness, but there are many ways you and your family can be more active. Anything that makes you breathe hard and gets your heart pumping is exercise. Here are some tips: 
¨ Walk to do errands or to take your child to school or the bus. ¨ Go for a family bike ride after dinner instead of watching TV. Where can you learn more? Go to http://juan jose-rolf.info/. Enter S957 in the search box to learn more about \"A Healthy Lifestyle: Care Instructions. \" Current as of: May 12, 2017 Content Version: 11.4 © 8852-3049 [employer]. Care instructions adapted under license by [employer] (which disclaims liability or warranty for this information). If you have questions about a medical condition or this instruction, always ask your healthcare professional. [address] any warranty or liability for your use of this information. Introducing [facility]! Dear Gita De La O: Thank you for requesting a [employer] account. Our records indicate that you already have an active [employer] account. You can access your account anytime at [email or web address] Did you know that you can access your hospital and ER discharge instructions at any time in [employer]? You can also review all of your test results from your hospital stay or ER visit. Additional Information If you have questions, please visit the Frequently Asked Questions section of the [employer] website at [email or web address] Remember, [employer] is NOT to be used for urgent needs. For medical emergencies, dial 911. Now available from your iPhone and Android! Please provide this summary of care documentation to your next provider. Your primary care clinician is listed as [employer]. If you have any questions after today's visit, please call [phone].

## 2017-11-14 NOTE — LETTER
11/14/2017 1:08 PM 
 
Patient:    Jane Villavicencio YOB: 1975 Date of Visit:    11/14/2017 Dear Mohsen Warner MD 
 
Thank you for referring Ms. Terry Carmen to me for evaluation/treatment. Below are the relevant portions of my assessment and plan of care. Neurology follow-up note 11/14/17 Chief Complaint Patient presents with  Follow-up MS same SUBJECTIVE Jane Villavicencio is a 43 y.o. female who presented to the neurology office for management of multiple sclerosis. At the age of 29 she did have left-sided numbness that had been improving and now she does have decreased sensation in her left hand. She was diagnosed with multiple sclerosis in November 2016 as at that time she did have sudden onset right leg weakness and it gets weak and fatigued. In January she did have diplopia. All the symptoms are better now. She initially tried Copaxone for 2 weeks but had hot flashes and was feeling sick and hence was switched over to Rebif December/January and has been doing better. No recent exacerbations. MRI of the brain, cervical and thoracic spine does show new lesion in the cervical spine but otherwise unchanged. Current Outpatient Prescriptions Medication Sig  ergocalciferol (VITAMIN D2) 50,000 unit capsule Take 50,000 Units by mouth.  interferon beta-1a, albumin, (REBIF, WITH ALBUMIN,) 44 mcg/0.5 mL injection by SubCUTAneous route every Monday, Wednesday, Friday.  norethindrone-ethinyl estradiol-iron (JUNEL FE 1.5/30, 28,) 1.5 mg-30 mcg (21)/75 mg (7) tablet Take 1 Tab by mouth daily.  LORATADINE (CLARITIN PO) Take 10 mg by mouth as needed. No current facility-administered medications for this visit. Past Medical History:  
Diagnosis Date  Ill-defined condition   
 uterine fibroids  Other ill-defined conditions(029.10)   
 sleep disorder and breast fibroids  Other ill-defined conditions(269.89) left arm numbness at times  Unspecified breast disorder Past Surgical History:  
Procedure Laterality Date  ABDOMEN SURGERY PROC UNLISTED    
 myomectomy  HX GYN    
 fibroids  HX HEENT    
 tooth extraction  HX OTHER SURGICAL  2015  
 laparoscopoy Family History Problem Relation Age of Onset  Bleeding Prob Mother  Endometriosis Mother  Cancer Maternal Grandmother   
  lung  Hypertension Maternal Grandfather  Stroke Maternal Grandfather Social History Substance Use Topics  Smoking status: Never Smoker  Smokeless tobacco: Never Used  Alcohol use Yes Comment: occasionally REVIEW OF SYSTEMS:  
A ten system review of constitutional, cardiovascular, respiratory, musculoskeletal, endocrine, skin, SHEENT, genitourinary, psychiatric and neurologic systems was obtained and is unremarkable except left-sided numbness and right leg weakness EXAMINATION:  
Visit Vitals  /64  Pulse 99  
 Ht 5' 2\" (1.575 m)  Wt 126 lb 12.8 oz (57.5 kg)  SpO2 99%  BMI 23.19 kg/m2 General:  
General appearance: Pt is in no acute distress Distal pulses are preserved Neurological Examination:  
Mental Status: AAO x3. Speech is fluent. Follows commands, has normal fund of knowledge, attention, short term recall, comprehension and insight. Cranial Nerves: Visual fields are full. PERRL, Extraocular movements are full. Facial sensation intact V1- V3. Facial movement intact, symmetric. Hearing intact to conversation. Palate elevates symmetrically. Shoulder shrug symmetric. Tongue midline. Motor: Strength is 5/5 in all 4 ext. Sensation: Decreased pinprick sensation distally in the left upper and lower extremities Coordination/Cerebellar: Intact to finger-nose-finger Skin: No significant bruising or lacerations. Laboratory review:  
Results for orders placed or performed in visit on 08/14/17 CBC WITH AUTOMATED DIFF Result Value Ref Range WBC 4.7 3.4 - 10.8 x10E3/uL  
 RBC 3.98 3.77 - 5.28 x10E6/uL HGB 12.5 11.1 - 15.9 g/dL HCT 37.4 34.0 - 46.6 % MCV 94 79 - 97 fL  
 MCH 31.4 26.6 - 33.0 pg  
 MCHC 33.4 31.5 - 35.7 g/dL  
 RDW 14.2 12.3 - 15.4 % PLATELET 015 619 - 271 x10E3/uL NEUTROPHILS 57 % Lymphocytes 28 % MONOCYTES 14 % EOSINOPHILS 1 % BASOPHILS 0 %  
 ABS. NEUTROPHILS 2.7 1.4 - 7.0 x10E3/uL Abs Lymphocytes 1.3 0.7 - 3.1 x10E3/uL  
 ABS. MONOCYTES 0.6 0.1 - 0.9 x10E3/uL  
 ABS. EOSINOPHILS 0.1 0.0 - 0.4 x10E3/uL  
 ABS. BASOPHILS 0.0 0.0 - 0.2 x10E3/uL IMMATURE GRANULOCYTES 0 %  
 ABS. IMM. GRANS. 0.0 0.0 - 0.1 x10E3/uL METABOLIC PANEL, COMPREHENSIVE Result Value Ref Range Glucose 89 65 - 99 mg/dL BUN 13 6 - 24 mg/dL Creatinine 0.66 0.57 - 1.00 mg/dL GFR est non- >59 mL/min/1.73 GFR est  >59 mL/min/1.73  
 BUN/Creatinine ratio 20 9 - 23 Sodium 141 134 - 144 mmol/L Potassium 4.3 3.5 - 5.2 mmol/L Chloride 100 96 - 106 mmol/L  
 CO2 28 18 - 29 mmol/L Calcium 9.5 8.7 - 10.2 mg/dL Protein, total 7.0 6.0 - 8.5 g/dL Albumin 4.4 3.5 - 5.5 g/dL GLOBULIN, TOTAL 2.6 1.5 - 4.5 g/dL A-G Ratio 1.7 1.2 - 2.2 Bilirubin, total 0.4 0.0 - 1.2 mg/dL Alk. phosphatase 57 39 - 117 IU/L  
 AST (SGOT) 19 0 - 40 IU/L  
 ALT (SGPT) 19 0 - 32 IU/L Imaging review: 
9/14/2017 MRI brain with and without contrast 
Multifocal white matter T2 hyperintensity in pattern consistent with medical history of multiple sclerosis. No abdominal intracranial enhancement. I reviewed the images MRI cervical spine with and without contrast 
Greater extent of cord signal alteration in the interval for the equivocal midline anterior cord enhancement at C3 level. Finding consistent with patient's diagnosis of MS. MRI thoracic spine with and without contrast 
Stable extent of nonenhancing cord lesion and thoracic spine in keeping with patient's clinical diagnosis of MS. 
 
10/4/2016 MRI thoracic spine with and without contrast 
Multifocal thoracic cord signal alteration without evidence for cord enhancement. Lower cervical spine degenerative disc disease. No thoracic spine degenerative disc disease or facet arthrosis. 7/13/2016 MRI cervical spine with and without contrast 
Abnormal T2 signal in the normal caliber cervical spinal cord spanning C1 through C7 and is compatible with provided diagnosis of chronic demyelinating disease. No MRI evidence of active demyelination. Documentation review: I reviewed the notes from Bernetta Meigs from 2/14/2017. Patient is taking ibuprofen at this point of time and will continue the same. The patient will return in 6 months and will discuss repeat imaging between 6 months and a year after initiating Requip to evaluate for effectiveness. Advised to continue on work, strength and exercise. Assessment/Plan:  
Beny Levy is a 43 y.o. female who presented to the neurology office for management of multiple sclerosis. The patient is doing well on treatment that she started around January of 2017. The patient was on Copaxone before but had a reaction. No new exacerbation. MRI of the brain does show a new lesion in the cervical spine but clinically no worsening of her symptoms. We will continue with Rebif and get imaging study of the brain in the year. 1.  Continue Rebif 2. Reimage in 1 year 3. Follow-up in 6 months ICD-10-CM ICD-9-CM 1. Multiple sclerosis ([facility] G35 340 Marleny Arauz MD 
Neurologist 
 
CC: Nacho Peck MD 
Fax: [phone] This note was created using voice recognition software. Despite editing, there may be syntax errors. This note will not be viewable in [address] If you have questions, please do not hesitate to call me. I look forward to following Ms. Severino June along with you. Sincerely, Marleny Arauz MD

## 2017-11-14 NOTE — PATIENT INSTRUCTIONS
1.  Follow-up in 6 months     A Healthy Lifestyle: Care Instructions  Your Care Instructions    A healthy lifestyle can help you feel good, stay at a healthy weight, and have plenty of energy for both work and play. A healthy lifestyle is something you can share with your whole family. A healthy lifestyle also can lower your risk for serious health problems, such as high blood pressure, heart disease, and diabetes. You can follow a few steps listed below to improve your health and the health of your family. Follow-up care is a key part of your treatment and safety. Be sure to make and go to all appointments, and call your doctor if you are having problems. It's also a good idea to know your test results and keep a list of the medicines you take. How can you care for yourself at home? · Do not eat too much sugar, fat, or fast foods. You can still have dessert and treats now and then. The goal is moderation. · Start small to improve your eating habits. Pay attention to portion sizes, drink less juice and soda pop, and eat more fruits and vegetables. ¨ Eat a healthy amount of food. A 3-ounce serving of meat, for example, is about the size of a deck of cards. Fill the rest of your plate with vegetables and whole grains. ¨ Limit the amount of soda and sports drinks you have every day. Drink more water when you are thirsty. ¨ Eat at least 5 servings of fruits and vegetables every day. It may seem like a lot, but it is not hard to reach this goal. A serving or helping is 1 piece of fruit, 1 cup of vegetables, or 2 cups of leafy, raw vegetables. Have an apple or some carrot sticks as an afternoon snack instead of a candy bar. Try to have fruits and/or vegetables at every meal.  · Make exercise part of your daily routine. You may want to start with simple activities, such as walking, bicycling, or slow swimming. Try to be active 30 to 60 minutes every day. You do not need to do all 30 to 60 minutes all at once.  For example, you can exercise 3 times a day for 10 or 20 minutes. Moderate exercise is safe for most people, but it is always a good idea to talk to your doctor before starting an exercise program.  · Keep moving. Kevin Aguilar the lawn, work in the garden, or [employer]. Take the stairs instead of the elevator at work. · If you smoke, quit. People who smoke have an increased risk for heart attack, stroke, cancer, and other lung illnesses. Quitting is hard, but there are ways to boost your chance of quitting tobacco for good. ¨ Use nicotine gum, patches, or lozenges. ¨ Ask your doctor about stop-smoking programs and medicines. ¨ Keep trying. In addition to reducing your risk of diseases in the future, you will notice some benefits soon after you stop using tobacco. If you have shortness of breath or asthma symptoms, they will likely get better within a few weeks after you quit. · Limit how much alcohol you drink. Moderate amounts of alcohol (up to 2 drinks a day for men, 1 drink a day for women) are okay. But drinking too much can lead to liver problems, high blood pressure, and other health problems. Family health  If you have a family, there are many things you can do together to improve your health. · Eat meals together as a family as often as possible. · Eat healthy foods. This includes fruits, vegetables, lean meats and dairy, and whole grains. · Include your family in your fitness plan. Most people think of activities such as jogging or tennis as the way to fitness, but there are many ways you and your family can be more active. Anything that makes you breathe hard and gets your heart pumping is exercise. Here are some tips:  ¨ Walk to do errands or to take your child to school or the bus. ¨ Go for a family bike ride after dinner instead of watching TV. Where can you learn more? Go to http://juan jose-rolf.info/.   Enter T870 in the search box to learn more about \"A Healthy Lifestyle: Care Instructions. \"  Current as of: May 12, 2017  Content Version: 11.4  © 5339-0321 [employer], [employer]. Care instructions adapted under license by [employer] (which disclaims liability or warranty for this information). If you have questions about a medical condition or this instruction, always ask your healthcare professional. [place] [address] any warranty or liability for your use of this information.

## 2017-11-14 NOTE — PROGRESS NOTES
Neurology follow-up note    11/14/17    Chief Complaint   Patient presents with    Follow-up     MS chase Prabhakar is a 43 y.o. female who presented to the neurology office for management of multiple sclerosis. At the age of 29 she did have left-sided numbness that had been improving and now she does have decreased sensation in her left hand. She was diagnosed with multiple sclerosis in November 2016 as at that time she did have sudden onset right leg weakness and it gets weak and fatigued. In January she did have diplopia. All the symptoms are better now. She initially tried Copaxone for 2 weeks but had hot flashes and was feeling sick and hence was switched over to Rebif December/January and has been doing better. No recent exacerbations. MRI of the brain, cervical and thoracic spine does show new lesion in the cervical spine but otherwise unchanged. Current Outpatient Prescriptions   Medication Sig    ergocalciferol (VITAMIN D2) 50,000 unit capsule Take 50,000 Units by mouth.  interferon beta-1a, albumin, (REBIF, WITH ALBUMIN,) 44 mcg/0.5 mL injection by SubCUTAneous route every Monday, Wednesday, Friday.  norethindrone-ethinyl estradiol-iron (JUNEL FE 1.5/30, 28,) 1.5 mg-30 mcg (21)/75 mg (7) tablet Take 1 Tab by mouth daily.  LORATADINE (CLARITIN PO) Take 10 mg by mouth as needed. No current facility-administered medications for this visit.         Past Medical History:   Diagnosis Date    Ill-defined condition     uterine fibroids    Other ill-defined conditions(799.89)     sleep disorder and breast fibroids    Other ill-defined conditions(799.89)     left arm numbness at times    Unspecified breast disorder      Past Surgical History:   Procedure Laterality Date    ABDOMEN SURGERY PROC UNLISTED      myomectomy    HX GYN      fibroids    HX HEENT      tooth extraction    HX OTHER SURGICAL  2015    laparoscopoy      Family History   Problem Relation Age of Onset    Bleeding Prob Mother     Endometriosis Mother     Cancer Maternal Grandmother      lung    Hypertension Maternal Grandfather     Stroke Maternal Grandfather      Social History   Substance Use Topics    Smoking status: Never Smoker    Smokeless tobacco: Never Used    Alcohol use Yes      Comment: occasionally       REVIEW OF SYSTEMS:   A ten system review of constitutional, cardiovascular, respiratory, musculoskeletal, endocrine, skin, SHEENT, genitourinary, psychiatric and neurologic systems was obtained and is unremarkable except left-sided numbness and right leg weakness    EXAMINATION:   Visit Vitals    /64    Pulse 99    Ht 5' 2\" (1.575 m)    Wt 126 lb 12.8 oz (57.5 kg)    SpO2 99%    BMI 23.19 kg/m2        General:   General appearance: Pt is in no acute distress   Distal pulses are preserved    Neurological Examination:   Mental Status: AAO x3. Speech is fluent. Follows commands, has normal fund of knowledge, attention, short term recall, comprehension and insight. Cranial Nerves: Visual fields are full. PERRL, Extraocular movements are full. Facial sensation intact V1- V3. Facial movement intact, symmetric. Hearing intact to conversation. Palate elevates symmetrically. Shoulder shrug symmetric. Tongue midline. Motor: Strength is 5/5 in all 4 ext. Sensation: Decreased pinprick sensation distally in the left upper and lower extremities    Coordination/Cerebellar: Intact to finger-nose-finger     Skin: No significant bruising or lacerations.     Laboratory review:   Results for orders placed or performed in visit on 08/14/17   CBC WITH AUTOMATED DIFF   Result Value Ref Range    WBC 4.7 3.4 - 10.8 x10E3/uL    RBC 3.98 3.77 - 5.28 x10E6/uL    HGB 12.5 11.1 - 15.9 g/dL    HCT 37.4 34.0 - 46.6 %    MCV 94 79 - 97 fL    MCH 31.4 26.6 - 33.0 pg    MCHC 33.4 31.5 - 35.7 g/dL    RDW 14.2 12.3 - 15.4 %    PLATELET 624 677 - 867 x10E3/uL    NEUTROPHILS 57 %    Lymphocytes 28 % MONOCYTES 14 %    EOSINOPHILS 1 %    BASOPHILS 0 %    ABS. NEUTROPHILS 2.7 1.4 - 7.0 x10E3/uL    Abs Lymphocytes 1.3 0.7 - 3.1 x10E3/uL    ABS. MONOCYTES 0.6 0.1 - 0.9 x10E3/uL    ABS. EOSINOPHILS 0.1 0.0 - 0.4 x10E3/uL    ABS. BASOPHILS 0.0 0.0 - 0.2 x10E3/uL    IMMATURE GRANULOCYTES 0 %    ABS. IMM. GRANS. 0.0 0.0 - 0.1 [id number]/XX   METABOLIC PANEL, COMPREHENSIVE   Result Value Ref Range    Glucose 89 65 - 99 mg/dL    BUN 13 6 - 24 mg/dL    Creatinine 0.66 0.57 - 1.00 mg/dL    GFR est non- >59 mL/min/1.73    GFR est  >59 mL/min/1.73    BUN/Creatinine ratio 20 9 - 23    Sodium 141 134 - 144 mmol/L    Potassium 4.3 3.5 - 5.2 mmol/L    Chloride 100 96 - 106 mmol/L    CO2 28 18 - 29 mmol/L    Calcium 9.5 8.7 - 10.2 mg/dL    Protein, total 7.0 6.0 - 8.5 g/dL    Albumin 4.4 3.5 - 5.5 g/dL    GLOBULIN, TOTAL 2.6 1.5 - 4.5 g/dL    A-G Ratio 1.7 1.2 - 2.2    Bilirubin, total 0.4 0.0 - 1.2 mg/dL    Alk. phosphatase 57 39 - 117 IU/L    AST (SGOT) 19 0 - 40 IU/L    ALT (SGPT) 19 0 - 32 IU/L       Imaging review:  9/14/2017  MRI brain with and without contrast  Multifocal white matter T2 hyperintensity in pattern consistent with medical history of multiple sclerosis. No abdominal intracranial enhancement. I reviewed the images    MRI cervical spine with and without contrast  Greater extent of cord signal alteration in the interval for the equivocal midline anterior cord enhancement at C3 level. Finding consistent with patient's diagnosis of MS. MRI thoracic spine with and without contrast  Stable extent of nonenhancing cord lesion and thoracic spine in keeping with patient's clinical diagnosis of MS.    10/4/2016  MRI thoracic spine with and without contrast  Multifocal thoracic cord signal alteration without evidence for cord enhancement. Lower cervical spine degenerative disc disease. No thoracic spine degenerative disc disease or facet arthrosis.     7/13/2016  MRI cervical spine with and without contrast  Abnormal T2 signal in the normal caliber cervical spinal cord spanning C1 through C7 and is compatible with provided diagnosis of chronic demyelinating disease. No MRI evidence of active demyelination. Documentation review:  I reviewed the notes from Ravi Marie from 2/14/2017. Patient is taking ibuprofen at this point of time and will continue the same. The patient will return in 6 months and will discuss repeat imaging between 6 months and a year after initiating Requip to evaluate for effectiveness. Advised to continue on work, strength and exercise. Assessment/Plan:   Abelardo De Dios is a 43 y.o. female who presented to the neurology office for management of multiple sclerosis. The patient is doing well on treatment that she started around January of 2017. The patient was on Copaxone before but had a reaction. No new exacerbation. MRI of the brain does show a new lesion in the cervical spine but clinically no worsening of her symptoms. We will continue with Rebif and get imaging study of the brain in the year. 1.  Continue Rebif  2. Reimage in 1 year  3. Follow-up in 6 months      ICD-10-CM ICD-9-CM    1. Multiple sclerosis ([facility] Luli Ordonez MD  Neurologist    CC: Vishnu Whitaker MD  Fax: [phone]    This note was created using voice recognition software. Despite editing, there may be syntax errors. This note will not be viewable in [address]

## 2017-12-07 NOTE — TELEPHONE ENCOUNTER
Requested Prescriptions     Pending Prescriptions Disp Refills    interferon beta-1a, albumin, (REBIF, WITH ALBUMIN,) 44 mcg/0.5 mL injection 12 Syringe 5     Si.5 mL by SubCUTAneous route every Monday, Wednesday, Friday.

## 2017-12-13 ENCOUNTER — TELEPHONE (OUTPATIENT)
Dept: NEUROLOGY | Age: 42
End: 2017-12-13

## 2017-12-15 ENCOUNTER — TELEPHONE (OUTPATIENT)
Dept: NEUROLOGY | Age: 42
End: 2017-12-15

## 2017-12-18 ENCOUNTER — TELEPHONE (OUTPATIENT)
Dept: NEUROLOGY | Age: 42
End: 2017-12-18

## 2017-12-18 NOTE — TELEPHONE ENCOUNTER
----- Message from Robby Case sent at 12/18/2017  1:07 PM EST -----  Regarding: Dr. Zorita Crigler Shirley([facility]) stated the pharmacist has a question regarding a Rx('ReBif\" 44 mcg) received for the pt. Bishnu Alexander stated the refill Rx received was for the prefill syringe, however the pt has been using the refill pens form, and need to verify. Best contact number [phone].

## 2017-12-20 NOTE — TELEPHONE ENCOUNTER
Received call from [address] [place] [address]. The pharmacist has a question regarding the patient's Rebif rx and would like a call back please. [phone], please use ref # [id number] when calling back.

## 2017-12-21 ENCOUNTER — TELEPHONE (OUTPATIENT)
Dept: NEUROLOGY | Age: 42
End: 2017-12-21

## 2017-12-22 ENCOUNTER — TELEPHONE (OUTPATIENT)
Dept: NEUROLOGY | Age: 42
End: 2017-12-22

## 2017-12-28 ENCOUNTER — DOCUMENTATION ONLY (OUTPATIENT)
Dept: NEUROLOGY | Age: 42
End: 2017-12-28

## 2018-04-12 ENCOUNTER — HOSPITAL ENCOUNTER (EMERGENCY)
Age: 43
Discharge: HOME | End: 2018-04-12
Attending: EMERGENCY MEDICINE
Payer: COMMERCIAL

## 2018-04-12 VITALS
HEIGHT: 62 IN | HEART RATE: 90 BPM | OXYGEN SATURATION: 100 % | DIASTOLIC BLOOD PRESSURE: 90 MMHG | RESPIRATION RATE: 14 BRPM | TEMPERATURE: 98.9 F | WEIGHT: 138.01 LBS | BODY MASS INDEX: 25.4 KG/M2 | SYSTOLIC BLOOD PRESSURE: 140 MMHG

## 2018-04-12 DIAGNOSIS — M79.604 RIGHT LEG PAIN: Primary | ICD-10-CM

## 2018-04-12 PROCEDURE — 99283 EMERGENCY DEPT VISIT LOW MDM: CPT

## 2018-04-12 RX ORDER — OXYCODONE AND ACETAMINOPHEN 5; 325 MG/1; MG/1
1 TABLET ORAL
Qty: 10 TAB | Refills: 0 | Status: SHIPPED | OUTPATIENT
Start: 2018-04-12 | End: 2018-05-14

## 2018-04-12 RX ORDER — IBUPROFEN 600 MG/1
600 TABLET ORAL
Qty: 20 TAB | Refills: 0 | Status: SHIPPED | OUTPATIENT
Start: 2018-04-12 | End: 2019-06-03 | Stop reason: ALTCHOICE

## 2018-04-12 NOTE — ED PROVIDER NOTES
EMERGENCY DEPARTMENT HISTORY AND PHYSICAL EXAM      Date: 4/12/2018  Patient Name: Kiran Chanel    History of Presenting Illness     Chief Complaint   Patient presents with    Foot Pain     pt reports right foot pain off and on for several days, pain radiates up leg to hip       History Provided By: Patient    HPI: Kiran Chanel, 43 y.o. female with PMHx significant for MS, presents ambulatory to the ED with cc of gradually worsening right leg pain that radiates from her right hip down to her right foot x 1 week. She states that her pain is exacerbated with ambulation. She reports taking Aleve x 2 this morning with some relief in pain. She notes that she does sleep on her right side, which may have contributed to her pain. She states that she is also prescribed Rebif injections MWF, and she has been taking her medication as prescribed. She notes having Neurology follow up in 1 week. She denies any recent falls/injuries. She specifically denies all other complaints at this time. There are no other complaints, changes, or physical findings at this time. PCP: Tamar Villanueva MD   Neurology: Laquita Islas MD    Current Outpatient Prescriptions   Medication Sig Dispense Refill    ibuprofen (MOTRIN) 600 mg tablet Take 1 Tab by mouth every eight (8) hours as needed for Pain. 20 Tab 0    oxyCODONE-acetaminophen (PERCOCET) 5-325 mg per tablet Take 1 Tab by mouth every four (4) hours as needed for Pain. Max Daily Amount: 6 Tabs. 10 Tab 0    interferon beta-1a, albumin, (REBIF, WITH ALBUMIN,) 44 mcg/0.5 mL injection 0.5 mL by SubCUTAneous route every Monday, Wednesday, Friday. 12 Syringe 5    ergocalciferol (VITAMIN D2) 50,000 unit capsule Take 50,000 Units by mouth.  norethindrone-ethinyl estradiol-iron (JUNEL FE 1.5/30, 28,) 1.5 mg-30 mcg (21)/75 mg (7) tablet Take 1 Tab by mouth daily.  LORATADINE (CLARITIN PO) Take 10 mg by mouth as needed.          Past History     Past Medical History:  Past Medical History:   Diagnosis Date    Ill-defined condition     uterine fibroids    Multiple sclerosis (HCC)     Other ill-defined conditions(799.89)     sleep disorder and breast fibroids    Other ill-defined conditions(799.89)     left arm numbness at times    Unspecified breast disorder        Past Surgical History:  Past Surgical History:   Procedure Laterality Date    ABDOMEN SURGERY PROC UNLISTED      myomectomy    HX GYN      fibroids    HX HEENT      tooth extraction    HX OTHER SURGICAL  2015    laparoscopoy        Family History:  Family History   Problem Relation Age of Onset    Bleeding Prob Mother     Endometriosis Mother     Cancer Maternal Grandmother      lung    Hypertension Maternal Grandfather     Stroke Maternal Grandfather        Social History:  Social History   Substance Use Topics    Smoking status: Never Smoker    Smokeless tobacco: Never Used    Alcohol use Yes      Comment: occasionally       Allergies:  No Known Allergies    Review of Systems   Review of Systems   Constitutional: Negative for fatigue and fever. HENT: Negative for ear pain and sore throat. Eyes: Negative for pain, redness and visual disturbance. Respiratory: Negative for cough and shortness of breath. Cardiovascular: Negative for chest pain and palpitations. Gastrointestinal: Negative for abdominal pain, nausea and vomiting. Genitourinary: Negative for dysuria, frequency and urgency. Musculoskeletal: Positive for myalgias (Right leg). Negative for back pain, gait problem, neck pain and neck stiffness. Skin: Negative for rash and wound. Neurological: Negative for dizziness, weakness, light-headedness, numbness and headaches. Physical Exam   Physical Exam   Constitutional: She is oriented to person, place, and time. She appears well-developed and well-nourished. Non-toxic appearance. No distress. HENT:   Head: Normocephalic and atraumatic.    Right Ear: External ear normal.   Left Ear: External ear normal.   Nose: Nose normal.   Mouth/Throat: Uvula is midline. No trismus in the jaw. Eyes: Conjunctivae and EOM are normal. Pupils are equal, round, and reactive to light. No scleral icterus. Neck: Normal range of motion and full passive range of motion without pain. Cardiovascular: Normal rate and regular rhythm. Pulmonary/Chest: Effort normal. No accessory muscle usage. No tachypnea. No respiratory distress. She has no decreased breath sounds. She has no wheezes. Abdominal: Soft. There is no tenderness. Musculoskeletal: Normal range of motion. Ambulatory with a stable gait, without a limp. Full active ROM of BL LEs. There is no bruising, redness, or swelling; good symmetry. There is right leg pain eminating from the right hip that radiates laterally and distally. Neurological: She is alert and oriented to person, place, and time. She is not disoriented. No cranial nerve deficit. GCS eye subscore is 4. GCS verbal subscore is 5. GCS motor subscore is 6. Skin: Skin is intact. No rash noted. Psychiatric: She has a normal mood and affect. Her speech is normal.   Nursing note and vitals reviewed. Medical Decision Making   I am the first provider for this patient. I reviewed the vital signs, available nursing notes, past medical history, past surgical history, family history and social history. Vital Signs-Reviewed the patient's vital signs. Patient Vitals for the past 12 hrs:   Temp Pulse Resp BP SpO2   04/12/18 0828 - 90 14 140/90 100 %   04/12/18 0813 98.9 °F (37.2 °C) 93 16 (!) 141/111 100 %       Records Reviewed: Nursing Notes, Old Medical Records and Previous Radiology Studies    Provider Notes (Medical Decision Making): Afebrile; well appearing; reassuring exam; no specific injury; additional testing deferred; plan as below. ED Course:   Initial assessment performed.  The patients presenting problems have been discussed, and they are in agreement with the care plan formulated and outlined with them. I have encouraged them to ask questions as they arise throughout their visit. Progress Note:  8:20 AM  Given the pt's clinical presentation, imaging is deferred. Pt was offered symptomatic treatment, and she is in agreement with this plan. Pt was encouraged to follow up with her PCP and Neurologist, and if her symptoms persist. Pt's medical records were reviewed, and she was encouraged to follow up with Neurology for follow up imaging. Written by Sarah [place], ED Scribe, as dictated by Vicky Moss. Progress Note:  8:29 AM   was reviewed. Written by Sarah [place], ED Scribe, as dictated by Vicky Moss. Critical Care Time: 0 minutes    Discharge Note:  8:30 AM  The pt is ready for discharge. The pt's signs, symptoms, diagnosis, and discharge instructions have been discussed and pt has conveyed their understanding. The pt is to follow up as recommended or return to ER should their symptoms worsen. Plan has been discussed and pt is in agreement. PLAN:  1. Discharge Medication List as of 4/12/2018  8:34 AM      START taking these medications    Details   ibuprofen (MOTRIN) 600 mg tablet Take 1 Tab by mouth every eight (8) hours as needed for Pain., Print, Disp-20 Tab, R-0      oxyCODONE-acetaminophen (PERCOCET) 5-325 mg per tablet Take 1 Tab by mouth every four (4) hours as needed for Pain. Max Daily Amount: 6 Tabs., Print, Disp-10 Tab, R-0         CONTINUE these medications which have NOT CHANGED    Details   interferon beta-1a, albumin, (REBIF, WITH ALBUMIN,) 44 mcg/0.5 mL injection 0.5 mL by SubCUTAneous route every Monday, Wednesday, Friday., Normal, Disp-12 Syringe, R-5      ergocalciferol (VITAMIN D2) 50,000 unit capsule Take 50,000 Units by mouth., Historical Med      norethindrone-ethinyl estradiol-iron (JUNEL FE 1.5/30, 28,) 1.5 mg-30 mcg (21)/75 mg (7) tablet Take 1 Tab by mouth daily. , Historical Med      LORATADINE (CLARITIN PO) Take 10 mg by mouth as needed., Historical Med           2. Follow-up Information     Follow up With Details Comments Contact Lor Odom MD Schedule an appointment as soon as possible for a visit NEUROLOGY: call today to schedule follow up [address]  [place]  [phone]          Return to ED if worse     Diagnosis     Clinical Impression:   1. Right leg pain        Attestations: This note is prepared by Caty Rojas, acting as a Scribe for Kenneth Barr. AIRAM Nova Repress: The scribe's documentation has been prepared under my direction and personally reviewed by me in its entirety. I confirm that the notes above accurately reflects all work, treatment, procedures, and medical decision making performed by me. This note will not be viewable in [address]

## 2018-04-12 NOTE — LETTER
[address] 
[facility] EMER[facility]ENCY DEPT 
[address] [id number] 
[phone] Work/School Note Date: 4/12/2018 To Whom It May concern: 
 
Rad Gomez was seen and treated today in the emergency room by the following provider(s): 
Attending Provider: Maritza Yancey DO Physician Assistant: LISS Tejada. Rad Gomez may return to work on [id number]. Sincerely, LISS Tejada

## 2018-05-14 ENCOUNTER — OFFICE VISIT (OUTPATIENT)
Dept: NEUROLOGY | Age: 43
End: 2018-05-14

## 2018-05-14 VITALS
DIASTOLIC BLOOD PRESSURE: 78 MMHG | OXYGEN SATURATION: 98 % | WEIGHT: 138.2 LBS | BODY MASS INDEX: 25.43 KG/M2 | SYSTOLIC BLOOD PRESSURE: 122 MMHG | HEART RATE: 87 BPM | HEIGHT: 62 IN

## 2018-05-14 DIAGNOSIS — G35 MULTIPLE SCLEROSIS (HCC): Primary | ICD-10-CM

## 2018-05-14 NOTE — LETTER
5/14/2018 9:21 AM 
 
Patient:    Mckay Bishop YOB: 1975 Date of Visit:    5/14/2018 Dear Sebastián Villavicencio MD 
 
Thank you for referring Ms. Laurita Cedeño to me for evaluation/treatment. Below are the relevant portions of my assessment and plan of care. Neurology follow-up note 05/14/18 Chief Complaint Patient presents with  Follow-up MS/ER visit SUBJECTIVE Mckay Bishop is a 43 y.o. female who presented to the neurology office for management of multiple sclerosis. At the age of 29 she did have left-sided numbness that had been improving and now she does have decreased sensation in her left hand. She was diagnosed with multiple sclerosis in November 2016 as at that time she did have sudden onset right leg weakness and it gets weak and fatigued. In January she did have diplopia. All the symptoms are better now. She initially tried Copaxone for 2 weeks but had hot flashes and was feeling sick and hence was switched over to Rebif December/January 2017 and has been doing better. No recent exacerbations. MRI of the brain, cervical and thoracic spine does show new lesion in the cervical spine but otherwise unchanged. Plan was to repeat imaging study in a year but the patient's out of pocket cost is $24,000. Will hold off on imaging because of that reason. The patient was seen in the emergency room in April because of right lower extremity pain patient resolved in around a week. Multiple sclerosis is stable. Current Outpatient Prescriptions Medication Sig  ibuprofen (MOTRIN) 600 mg tablet Take 1 Tab by mouth every eight (8) hours as needed for Pain.  interferon beta-1a, albumin, (REBIF, WITH ALBUMIN,) 44 mcg/0.5 mL injection 0.5 mL by SubCUTAneous route every Monday, Wednesday, Friday.  ergocalciferol (VITAMIN D2) 50,000 unit capsule Take 50,000 Units by mouth.   
 norethindrone-ethinyl estradiol-iron (JUNEL FE 1.5/30, 28,) 1.5 mg-30 mcg (21)/75 mg (7) tablet Take 1 Tab by mouth daily.  LORATADINE (CLARITIN PO) Take 10 mg by mouth as needed. No current facility-administered medications for this visit. Past Medical History:  
Diagnosis Date  Ill-defined condition   
 uterine fibroids  Multiple sclerosis ([address]  Other ill-defined conditions(799.89)   
 sleep disorder and breast fibroids  Other ill-defined conditions(799.89) left arm numbness at times  Unspecified breast disorder Past Surgical History:  
Procedure Laterality Date  ABDOMEN SURGERY PROC UNLISTED    
 myomectomy  HX GYN    
 fibroids  HX HEENT    
 tooth extraction  HX OTHER SURGICAL  2015  
 laparoscopoy Family History Problem Relation Age of Onset  Bleeding Prob Mother  Endometriosis Mother  Cancer Maternal Grandmother   
  lung  Hypertension Maternal Grandfather  Stroke Maternal Grandfather Social History Substance Use Topics  Smoking status: Never Smoker  Smokeless tobacco: Never Used  Alcohol use Yes Comment: occasionally REVIEW OF SYSTEMS:  
A ten system review of constitutional, cardiovascular, respiratory, musculoskeletal, endocrine, skin, SHEENT, genitourinary, psychiatric and neurologic systems was obtained and is unremarkable except left-sided numbness and right leg weakness EXAMINATION:  
Visit Vitals  /78  Pulse 87  
 Ht 5' 2\" (1.575 m)  Wt 138 lb 3.2 oz (62.7 kg)  SpO2 98%  BMI 25.28 kg/m2 General:  
General appearance: Pt is in no acute distress Distal pulses are preserved Neurological Examination:  
Mental Status: AAO x3. Speech is fluent. Follows commands, has normal fund of knowledge, attention, short term recall, comprehension and insight. Cranial Nerves: Visual fields are full. PERRL, Extraocular movements are full. Facial sensation intact V1- V3. Facial movement intact, symmetric. Hearing intact to conversation. Palate elevates symmetrically. Shoulder shrug symmetric. Tongue midline. Motor: Strength is 5/5 in all 4 ext. Sensation: Intact to pinprick sensation Coordination/Cerebellar: Intact to finger-nose-finger Skin: No significant bruising or lacerations. Laboratory review:  
Results for orders placed or performed in visit on 08/14/17 CBC WITH AUTOMATED DIFF Result Value Ref Range WBC 4.7 3.4 - 10.8 x10E3/uL  
 RBC 3.98 3.77 - 5.28 x10E6/uL HGB 12.5 11.1 - 15.9 g/dL HCT 37.4 34.0 - 46.6 % MCV 94 79 - 97 fL  
 MCH 31.4 26.6 - 33.0 pg  
 MCHC 33.4 31.5 - 35.7 g/dL  
 RDW 14.2 12.3 - 15.4 % PLATELET 248 686 - 739 x10E3/uL NEUTROPHILS 57 % Lymphocytes 28 % MONOCYTES 14 % EOSINOPHILS 1 % BASOPHILS 0 %  
 ABS. NEUTROPHILS 2.7 1.4 - 7.0 x10E3/uL Abs Lymphocytes 1.3 0.7 - 3.1 x10E3/uL  
 ABS. MONOCYTES 0.6 0.1 - 0.9 x10E3/uL  
 ABS. EOSINOPHILS 0.1 0.0 - 0.4 x10E3/uL  
 ABS. BASOPHILS 0.0 0.0 - 0.2 x10E3/uL IMMATURE GRANULOCYTES 0 %  
 ABS. IMM. GRANS. 0.0 0.0 - 0.1 x10E3/uL METABOLIC PANEL, COMPREHENSIVE Result Value Ref Range Glucose 89 65 - 99 mg/dL BUN 13 6 - 24 mg/dL Creatinine 0.66 0.57 - 1.00 mg/dL GFR est non- >59 mL/min/1.73 GFR est  >59 mL/min/1.73  
 BUN/Creatinine ratio 20 9 - 23 Sodium 141 134 - 144 mmol/L Potassium 4.3 3.5 - 5.2 mmol/L Chloride 100 96 - 106 mmol/L  
 CO2 28 18 - 29 mmol/L Calcium 9.5 8.7 - 10.2 mg/dL Protein, total 7.0 6.0 - 8.5 g/dL Albumin 4.4 3.5 - 5.5 g/dL GLOBULIN, TOTAL 2.6 1.5 - 4.5 g/dL A-G Ratio 1.7 1.2 - 2.2 Bilirubin, total 0.4 0.0 - 1.2 mg/dL Alk. phosphatase 57 39 - 117 IU/L  
 AST (SGOT) 19 0 - 40 IU/L  
 ALT (SGPT) 19 0 - 32 IU/L Imaging review: 
9/14/2017 MRI brain with and without contrast 
Multifocal white matter T2 hyperintensity in pattern consistent with medical history of multiple sclerosis.   No abdominal intracranial enhancement. I reviewed the images MRI cervical spine with and without contrast 
Greater extent of cord signal alteration in the interval for the equivocal midline anterior cord enhancement at C3 level. Finding consistent with patient's diagnosis of MS. MRI thoracic spine with and without contrast 
Stable extent of nonenhancing cord lesion and thoracic spine in keeping with patient's clinical diagnosis of MS. 
 
10/4/2016 MRI thoracic spine with and without contrast 
Multifocal thoracic cord signal alteration without evidence for cord enhancement. Lower cervical spine degenerative disc disease. No thoracic spine degenerative disc disease or facet arthrosis. 7/13/2016 MRI cervical spine with and without contrast 
Abnormal T2 signal in the normal caliber cervical spinal cord spanning C1 through C7 and is compatible with provided diagnosis of chronic demyelinating disease. No MRI evidence of active demyelination. Documentation review: I reviewed the notes from Celesta Bosworth from 2/14/2017. Patient is taking ibuprofen at this point of time and will continue the same. The patient will return in 6 months and will discuss repeat imaging between 6 months and a year after initiating Requip to evaluate for effectiveness. Advised to continue on work, strength and exercise. Assessment/Plan:  
Bradley Aguero is a 43 y.o. female who presented to the neurology office for management of multiple sclerosis. The patient is doing well on Rebif that she started around January of 2017. The patient was on Copaxone before but had a reaction. No new exacerbation. MRI of the brain does show a new lesion in the cervical spine but clinically no worsening of her symptoms. We will continue with Rebif. Will get imaging of the brain and cervical spine if the patient develops any new symptoms. Not repeating MRI scans in a year because of financial reasons. 1.  Continue Rebif 2. Follow-up in 6 months Over 25 minutes was spent with the patient and family of which > 50% of the visit was spent counseling on diagnosis, management, and treatment of the diagnosis ICD-10-CM ICD-9-CM 1. Multiple sclerosis ([facility] G35 [id number] Sasha Deluna MD 
Neurologist 
 
CC: Crystal Gifford MD 
Fax: [phone] This note was created using voice recognition software. Despite editing, there may be syntax errors. This note will not be viewable in [address] If you have questions, please do not hesitate to call me. I look forward to following Ms. Mervat Cm along with you. Sincerely, Sasha Deluna MD

## 2018-05-14 NOTE — MR AVS SNAPSHOT
[address] [place] [address], 
[facility], [address] [facility] 
[phone] Patient: Yevgeniy Concepcion MRN: [id number] :1975 Visit Information Date & Time Provider Department Dept. Phone Encounter #  
 2018  8:40 AM Kimi Mayen MD [facility] [phone] [id number] Upcoming Health Maintenance Date Due DTaP/Tdap/Td series (1 - Tdap) 1996 PAP AKA CERVICAL CYTOLOGY 1996 Influenza Age 5 to Adult 2018 Allergies as of 2018  Review Complete On: 2018 By: Kimi Mayen MD  
 No Known Allergies Current Immunizations  Never Reviewed No immunizations on file. Not reviewed this visit Vitals BP Pulse Height(growth percentile) Weight(growth percentile) SpO2 BMI  
 122/78 87 5' 2\" (1.575 m) 138 lb 3.2 oz (62.7 kg) 98% 25.28 kg/m2 OB Status Smoking Status Having regular periods Never Smoker BMI and BSA Data Body Mass Index Body Surface Area  
 25.28 kg/m 2 1.66 m 2 Preferred Pharmacy Pharmacy Name Phone [id number] [place], [address] Your Updated Medication List  
  
   
This list is accurate as of 18  9:06 AM.  Always use your most recent med list.  
  
  
  
  
 Radha Constable Take 10 mg by mouth as needed. ibuprofen 600 mg tablet Commonly known as:  MOTRIN Take 1 Tab by mouth every eight (8) hours as needed for Pain. interferon beta-1a (albumin) 44 mcg/0.5 mL injection Commonly known as:  REBIF (WITH ALBUMIN) 0.5 mL by SubCUTAneous route every Monday, Wednesday, Friday. JUNEL FE 1.5/30 (28) 1.5 mg-30 mcg (21)/75 mg (7) Tab Generic drug:  norethindrone-ethinyl estradiol-iron Take 1 Tab by mouth daily. VITAMIN D2 50,000 unit capsule Generic drug:  ergocalciferol Take 50,000 Units by mouth. Patient Instructions 1. Follow-up in 6 months A Healthy Lifestyle: Care Instructions Your Care Instructions A healthy lifestyle can help you feel good, stay at a healthy weight, and have plenty of energy for both work and play. A healthy lifestyle is something you can share with your whole family. A healthy lifestyle also can lower your risk for serious health problems, such as high blood pressure, heart disease, and diabetes. You can follow a few steps listed below to improve your health and the health of your family. Follow-up care is a key part of your treatment and safety. Be sure to make and go to all appointments, and call your doctor if you are having problems. It's also a good idea to know your test results and keep a list of the medicines you take. How can you care for yourself at home? · Do not eat too much sugar, fat, or fast foods. You can still have dessert and treats now and then. The goal is moderation. · Start small to improve your eating habits. Pay attention to portion sizes, drink less juice and soda pop, and eat more fruits and vegetables. ¨ Eat a healthy amount of food. A 3-ounce serving of meat, for example, is about the size of a deck of cards. Fill the rest of your plate with vegetables and whole grains. ¨ Limit the amount of soda and sports drinks you have every day. Drink more water when you are thirsty. ¨ Eat at least 5 servings of fruits and vegetables every day. It may seem like a lot, but it is not hard to reach this goal. A serving or helping is 1 piece of fruit, 1 cup of vegetables, or 2 cups of leafy, raw vegetables. Have an apple or some carrot sticks as an afternoon snack instead of a candy bar. Try to have fruits and/or vegetables at every meal. 
· Make exercise part of your daily routine. You may want to start with simple activities, such as walking, bicycling, or slow swimming. Try to be active 30 to 60 minutes every day.  You do not need to do all 30 to 60 minutes all at once. For example, you can exercise 3 times a day for 10 or 20 minutes. Moderate exercise is safe for most people, but it is always a good idea to talk to your doctor before starting an exercise program. 
· Keep moving. Mishel Boys the lawn, work in the garden, or [place]. Take the stairs instead of the elevator at work. · If you smoke, quit. People who smoke have an increased risk for heart attack, stroke, cancer, and other lung illnesses. Quitting is hard, but there are ways to boost your chance of quitting tobacco for good. ¨ Use nicotine gum, patches, or lozenges. ¨ Ask your doctor about stop-smoking programs and medicines. ¨ Keep trying. In addition to reducing your risk of diseases in the future, you will notice some benefits soon after you stop using tobacco. If you have shortness of breath or asthma symptoms, they will likely get better within a few weeks after you quit. · Limit how much alcohol you drink. Moderate amounts of alcohol (up to 2 drinks a day for men, 1 drink a day for women) are okay. But drinking too much can lead to liver problems, high blood pressure, and other health problems. Family health If you have a family, there are many things you can do together to improve your health. · Eat meals together as a family as often as possible. · Eat healthy foods. This includes fruits, vegetables, lean meats and dairy, and whole grains. · Include your family in your fitness plan. Most people think of activities such as jogging or tennis as the way to fitness, but there are many ways you and your family can be more active. Anything that makes you breathe hard and gets your heart pumping is exercise. Here are some tips: 
¨ Walk to do errands or to take your child to school or the bus. ¨ Go for a family bike ride after dinner instead of watching TV. Where can you learn more? Go to http://juan jose-rolf.info/. Enter I607 in the search box to learn more about \"A Healthy Lifestyle: Care Instructions. \" Current as of: May 12, 2017 Content Version: 11.4 © 7473-4573 [employer]. Care instructions adapted under license by [employer] (which disclaims liability or warranty for this information). If you have questions about a medical condition or this instruction, always ask your healthcare professional. [address] any warranty or liability for your use of this information. Introducing [facility]! Dear Suresh Villasenor: Thank you for requesting a [employer] account. Our records indicate that you already have an active [employer] account. You can access your account anytime at [email or web address]. [employer] Did you know that you can access your hospital and ER discharge instructions at any time in [employer]? You can also review all of your test results from your hospital stay or ER visit. Additional Information If you have questions, please visit the Frequently Asked Questions section of the [employer] website at [email or web address]"/[employer]/. Remember, [employer] is NOT to be used for urgent needs. For medical emergencies, dial 911. Now available from your iPhone and Android! Please provide this summary of care documentation to your next provider. Your primary care clinician is listed as Jairo Font. If you have any questions after today's visit, please call [phone].

## 2018-05-14 NOTE — PROGRESS NOTES
Neurology follow-up note    05/14/18    Chief Complaint   Patient presents with    Follow-up     MS/ER visit       Cleo Wilson is a 43 y.o. female who presented to the neurology office for management of multiple sclerosis. At the age of 29 she did have left-sided numbness that had been improving and now she does have decreased sensation in her left hand. She was diagnosed with multiple sclerosis in November 2016 as at that time she did have sudden onset right leg weakness and it gets weak and fatigued. In January she did have diplopia. All the symptoms are better now. She initially tried Copaxone for 2 weeks but had hot flashes and was feeling sick and hence was switched over to Rebif December/January 2017 and has been doing better. No recent exacerbations. MRI of the brain, cervical and thoracic spine does show new lesion in the cervical spine but otherwise unchanged. Plan was to repeat imaging study in a year but the patient's out of pocket cost is $24,000. Will hold off on imaging because of that reason. The patient was seen in the emergency room in April because of right lower extremity pain patient resolved in around a week. Multiple sclerosis is stable. Current Outpatient Prescriptions   Medication Sig    ibuprofen (MOTRIN) 600 mg tablet Take 1 Tab by mouth every eight (8) hours as needed for Pain.  interferon beta-1a, albumin, (REBIF, WITH ALBUMIN,) 44 mcg/0.5 mL injection 0.5 mL by SubCUTAneous route every Monday, Wednesday, Friday.  ergocalciferol (VITAMIN D2) 50,000 unit capsule Take 50,000 Units by mouth.  norethindrone-ethinyl estradiol-iron (JUNEL FE 1.5/30, 28,) 1.5 mg-30 mcg (21)/75 mg (7) tablet Take 1 Tab by mouth daily.  LORATADINE (CLARITIN PO) Take 10 mg by mouth as needed. No current facility-administered medications for this visit.         Past Medical History:   Diagnosis Date    Ill-defined condition     uterine fibroids    Multiple sclerosis ([facility]     Other ill-defined conditions(799.89)     sleep disorder and breast fibroids    Other ill-defined conditions(799.89)     left arm numbness at times    Unspecified breast disorder      Past Surgical History:   Procedure Laterality Date    ABDOMEN SURGERY PROC UNLISTED      myomectomy    HX GYN      fibroids    HX HEENT      tooth extraction    HX OTHER SURGICAL  2015    laparoscopoy      Family History   Problem Relation Age of Onset    Bleeding Prob Mother     Endometriosis Mother     Cancer Maternal Grandmother      lung    Hypertension Maternal Grandfather     Stroke Maternal Grandfather      Social History   Substance Use Topics    Smoking status: Never Smoker    Smokeless tobacco: Never Used    Alcohol use Yes      Comment: occasionally       REVIEW OF SYSTEMS:   A ten system review of constitutional, cardiovascular, respiratory, musculoskeletal, endocrine, skin, SHEENT, genitourinary, psychiatric and neurologic systems was obtained and is unremarkable except left-sided numbness and right leg weakness    EXAMINATION:   Visit Vitals    /78    Pulse 87    Ht 5' 2\" (1.575 m)    Wt 138 lb 3.2 oz (62.7 kg)    SpO2 98%    BMI 25.28 kg/m2        General:   General appearance: Pt is in no acute distress   Distal pulses are preserved    Neurological Examination:   Mental Status: AAO x3. Speech is fluent. Follows commands, has normal fund of knowledge, attention, short term recall, comprehension and insight. Cranial Nerves: Visual fields are full. PERRL, Extraocular movements are full. Facial sensation intact V1- V3. Facial movement intact, symmetric. Hearing intact to conversation. Palate elevates symmetrically. Shoulder shrug symmetric. Tongue midline. Motor: Strength is 5/5 in all 4 ext. Sensation: Intact to pinprick sensation    Coordination/Cerebellar: Intact to finger-nose-finger     Skin: No significant bruising or lacerations.     Laboratory review:   Results for orders placed or performed in visit on 08/14/17   CBC WITH AUTOMATED DIFF   Result Value Ref Range    WBC 4.7 3.4 - 10.8 x10E3/uL    RBC 3.98 3.77 - 5.28 x10E6/uL    HGB 12.5 11.1 - 15.9 g/dL    HCT 37.4 34.0 - 46.6 %    MCV 94 79 - 97 fL    MCH 31.4 26.6 - 33.0 pg    MCHC 33.4 31.5 - 35.7 g/dL    RDW 14.2 12.3 - 15.4 %    PLATELET 376 958 - 933 x10E3/uL    NEUTROPHILS 57 %    Lymphocytes 28 %    MONOCYTES 14 %    EOSINOPHILS 1 %    BASOPHILS 0 %    ABS. NEUTROPHILS 2.7 1.4 - 7.0 x10E3/uL    Abs Lymphocytes 1.3 0.7 - 3.1 x10E3/uL    ABS. MONOCYTES 0.6 0.1 - 0.9 x10E3/uL    ABS. EOSINOPHILS 0.1 0.0 - 0.4 x10E3/uL    ABS. BASOPHILS 0.0 0.0 - 0.2 x10E3/uL    IMMATURE GRANULOCYTES 0 %    ABS. IMM. GRANS. 0.0 0.0 - 0.1 Z20U3/BT   METABOLIC PANEL, COMPREHENSIVE   Result Value Ref Range    Glucose 89 65 - 99 mg/dL    BUN 13 6 - 24 mg/dL    Creatinine 0.66 0.57 - 1.00 mg/dL    GFR est non- >59 mL/min/1.73    GFR est  >59 mL/min/1.73    BUN/Creatinine ratio 20 9 - 23    Sodium 141 134 - 144 mmol/L    Potassium 4.3 3.5 - 5.2 mmol/L    Chloride 100 96 - 106 mmol/L    CO2 28 18 - 29 mmol/L    Calcium 9.5 8.7 - 10.2 mg/dL    Protein, total 7.0 6.0 - 8.5 g/dL    Albumin 4.4 3.5 - 5.5 g/dL    GLOBULIN, TOTAL 2.6 1.5 - 4.5 g/dL    A-G Ratio 1.7 1.2 - 2.2    Bilirubin, total 0.4 0.0 - 1.2 mg/dL    Alk. phosphatase 57 39 - 117 IU/L    AST (SGOT) 19 0 - 40 IU/L    ALT (SGPT) 19 0 - 32 IU/L       Imaging review:  9/14/2017  MRI brain with and without contrast  Multifocal white matter T2 hyperintensity in pattern consistent with medical history of multiple sclerosis. No abdominal intracranial enhancement. I reviewed the images    MRI cervical spine with and without contrast  Greater extent of cord signal alteration in the interval for the equivocal midline anterior cord enhancement at C3 level. Finding consistent with patient's diagnosis of MS.     MRI thoracic spine with and without contrast  Stable extent of nonenhancing cord lesion and thoracic spine in keeping with patient's clinical diagnosis of MS.    10/4/2016  MRI thoracic spine with and without contrast  Multifocal thoracic cord signal alteration without evidence for cord enhancement. Lower cervical spine degenerative disc disease. No thoracic spine degenerative disc disease or facet arthrosis. 7/13/2016  MRI cervical spine with and without contrast  Abnormal T2 signal in the normal caliber cervical spinal cord spanning C1 through C7 and is compatible with provided diagnosis of chronic demyelinating disease. No MRI evidence of active demyelination. Documentation review:  I reviewed the notes from Clarice Crystal from 2/14/2017. Patient is taking ibuprofen at this point of time and will continue the same. The patient will return in 6 months and will discuss repeat imaging between 6 months and a year after initiating Requip to evaluate for effectiveness. Advised to continue on work, strength and exercise. Assessment/Plan:   Sergio Williamson is a 43 y.o. female who presented to the neurology office for management of multiple sclerosis. The patient is doing well on Rebif that she started around January of 2017. The patient was on Copaxone before but had a reaction. No new exacerbation. MRI of the brain does show a new lesion in the cervical spine but clinically no worsening of her symptoms. We will continue with Rebif. Will get imaging of the brain and cervical spine if the patient develops any new symptoms. Not repeating MRI scans in a year because of financial reasons. 1.  Continue Rebif  2.   Follow-up in 6 months    Over 25 minutes was spent with the patient and family of which > 50% of the visit was spent counseling on diagnosis, management, and treatment of the diagnosis        ICD-10-CM ICD-9-CM    1. Multiple sclerosis ([facility] Jeannette Haji MD  Neurologist    CC: Juan Cruz MD  Fax: [phone]    This note was created using voice recognition software. Despite editing, there may be syntax errors. This note will not be viewable in [address]

## 2018-05-14 NOTE — PATIENT INSTRUCTIONS
1.  Follow-up in 6 months     A Healthy Lifestyle: Care Instructions  Your Care Instructions    A healthy lifestyle can help you feel good, stay at a healthy weight, and have plenty of energy for both work and play. A healthy lifestyle is something you can share with your whole family. A healthy lifestyle also can lower your risk for serious health problems, such as high blood pressure, heart disease, and diabetes. You can follow a few steps listed below to improve your health and the health of your family. Follow-up care is a key part of your treatment and safety. Be sure to make and go to all appointments, and call your doctor if you are having problems. It's also a good idea to know your test results and keep a list of the medicines you take. How can you care for yourself at home? · Do not eat too much sugar, fat, or fast foods. You can still have dessert and treats now and then. The goal is moderation. · Start small to improve your eating habits. Pay attention to portion sizes, drink less juice and soda pop, and eat more fruits and vegetables. ¨ Eat a healthy amount of food. A 3-ounce serving of meat, for example, is about the size of a deck of cards. Fill the rest of your plate with vegetables and whole grains. ¨ Limit the amount of soda and sports drinks you have every day. Drink more water when you are thirsty. ¨ Eat at least 5 servings of fruits and vegetables every day. It may seem like a lot, but it is not hard to reach this goal. A serving or helping is 1 piece of fruit, 1 cup of vegetables, or 2 cups of leafy, raw vegetables. Have an apple or some carrot sticks as an afternoon snack instead of a candy bar. Try to have fruits and/or vegetables at every meal.  · Make exercise part of your daily routine. You may want to start with simple activities, such as walking, bicycling, or slow swimming. Try to be active 30 to 60 minutes every day. You do not need to do all 30 to 60 minutes all at once.  For example, you can exercise 3 times a day for 10 or 20 minutes. Moderate exercise is safe for most people, but it is always a good idea to talk to your doctor before starting an exercise program.  · Keep moving. Caleen Newer the lawn, work in the garden, or [employer]. Take the stairs instead of the elevator at work. · If you smoke, quit. People who smoke have an increased risk for heart attack, stroke, cancer, and other lung illnesses. Quitting is hard, but there are ways to boost your chance of quitting tobacco for good. ¨ Use nicotine gum, patches, or lozenges. ¨ Ask your doctor about stop-smoking programs and medicines. ¨ Keep trying. In addition to reducing your risk of diseases in the future, you will notice some benefits soon after you stop using tobacco. If you have shortness of breath or asthma symptoms, they will likely get better within a few weeks after you quit. · Limit how much alcohol you drink. Moderate amounts of alcohol (up to 2 drinks a day for men, 1 drink a day for women) are okay. But drinking too much can lead to liver problems, high blood pressure, and other health problems. Family health  If you have a family, there are many things you can do together to improve your health. · Eat meals together as a family as often as possible. · Eat healthy foods. This includes fruits, vegetables, lean meats and dairy, and whole grains. · Include your family in your fitness plan. Most people think of activities such as jogging or tennis as the way to fitness, but there are many ways you and your family can be more active. Anything that makes you breathe hard and gets your heart pumping is exercise. Here are some tips:  ¨ Walk to do errands or to take your child to school or the bus. ¨ Go for a family bike ride after dinner instead of watching TV. Where can you learn more? Go to http://juan jose-rolf.info/.   Enter R771 in the search box to learn more about \"A Healthy Lifestyle: Care Instructions. \"  Current as of: May 12, 2017  Content Version: 11.4  © 2274-9375 [employer], [employer]. Care instructions adapted under license by [employer] (which disclaims liability or warranty for this information). If you have questions about a medical condition or this instruction, always ask your healthcare professional. [place] [address] any warranty or liability for your use of this information.

## 2018-05-17 NOTE — TELEPHONE ENCOUNTER
Future Appointments  Date Time Provider Quynh Milesi   2018 10:00 AM Venu Rea MD [address]                         Last Appointment My Department:  2018    Please advise of refill below. Requested Prescriptions     Pending Prescriptions Disp Refills    interferon beta-1a, albumin, (REBIF, WITH ALBUMIN,) 44 mcg/0.5 mL injection 12 Syringe 5     Si.5 mL by SubCUTAneous route every Monday, Wednesday, Friday.

## 2018-07-13 ENCOUNTER — HOSPITAL ENCOUNTER (OUTPATIENT)
Dept: MAMMOGRAPHY | Age: 43
Discharge: HOME | End: 2018-07-13
Attending: OBSTETRICS & GYNECOLOGY
Payer: COMMERCIAL

## 2018-07-13 DIAGNOSIS — Z12.39 BREAST SCREENING: ICD-10-CM

## 2018-07-13 PROCEDURE — 77067 SCR MAMMO BI INCL CAD: CPT

## 2018-10-23 RX ORDER — INTERFERON BETA-1A 44 UG/.5ML
INJECTION, SOLUTION SUBCUTANEOUS
Qty: 6 ML | Refills: 5 | Status: SHIPPED | OUTPATIENT
Start: 2018-10-23 | End: 2018-12-12 | Stop reason: DRUGHIGH

## 2018-10-23 NOTE — TELEPHONE ENCOUNTER
Future Appointments   Date Time Provider Quynh Milesi   11/6/2018 10:00 AM Jason Steven MD [address]                         Last Appointment My Department:  5/14/2018    Please advise of refill below.  Dre patient  Requested Prescriptions     Pending Prescriptions Disp Refills    REBIF, WITH ALBUMIN, 44 mcg/0.5 mL injection [Pharmacy Med Name: REBIF PREFILL SYR 0.5ML 1'S 44MCG] 6 mL 5     Sig: INJECT 40 MCG (0.5 ML) UNDER THE SKIN EVERY MONDAY, WEDNESDAY, AND FRIDAY

## 2018-10-29 ENCOUNTER — DOCUMENTATION ONLY (OUTPATIENT)
Dept: NEUROLOGY | Age: 43
End: 2018-10-29

## 2018-11-06 ENCOUNTER — OFFICE VISIT (OUTPATIENT)
Dept: NEUROLOGY | Age: 43
End: 2018-11-06

## 2018-11-06 VITALS
SYSTOLIC BLOOD PRESSURE: 125 MMHG | OXYGEN SATURATION: 98 % | BODY MASS INDEX: 25.58 KG/M2 | HEIGHT: 62 IN | HEART RATE: 91 BPM | WEIGHT: 139 LBS | DIASTOLIC BLOOD PRESSURE: 78 MMHG

## 2018-11-06 DIAGNOSIS — G35 MULTIPLE SCLEROSIS (HCC): Primary | ICD-10-CM

## 2018-11-06 DIAGNOSIS — R53.83 FATIGUE, UNSPECIFIED TYPE: ICD-10-CM

## 2018-11-06 RX ORDER — MODAFINIL 200 MG/1
200 TABLET ORAL
Qty: 30 TAB | Refills: 5 | Status: SHIPPED | OUTPATIENT
Start: 2018-11-06 | End: 2019-06-03 | Stop reason: ALTCHOICE

## 2018-11-06 NOTE — PATIENT INSTRUCTIONS
1.  Modafinil 200 mg every morning  2. Follow-up in 6 months    Office Policies  · Phone calls/patient messages:  Please allow up to 24 hours for someone in the office to contact you about your call or message. Be mindful your provider may be out of the office or your message may require further review. We encourage you to use [employer] music for your messages as this is a faster, more efficient way to communicate with our office  · Medication Refills:  Prescription medications require up to 48 business hours to process. We encourage you to use blinkbox music for your refills. For controlled medications: Please allow up to 72 business hours to process. Certain medications may require you to  a written prescription at our office. NO narcotic/controlled medications will be prescribed after 4pm Monday through Friday or on weekends  · Form/Paperwork Completion:  Please note there is a $25 fee for all paperwork completed by our providers. We ask that you allow 7-14 business days. Pre-payment is due prior to picking up/faxing the completed form. You may also download your forms to [employer] to have your doctor print off.

## 2018-11-06 NOTE — PROGRESS NOTES
Neurology follow-up note    Chief Complaint   Patient presents with    Follow-up     multiple sclerosis       Von Armas is a 37 y.o. female who presented to the neurology office for management of multiple sclerosis. At the age of 29 she did have left-sided numbness that had been improving and now she does have decreased sensation in her left hand. She was diagnosed with multiple sclerosis in November 2016 as at that time she did have sudden onset right leg weakness and it gets weak and fatigued. In January she did have diplopia. All the symptoms are better now. She initially tried Copaxone for 2 weeks but had hot flashes and was feeling sick and hence was switched over to Rebif December/January 2017 and has been doing better. No recent exacerbations. MRI of the brain, cervical and thoracic spine does show new lesion in the cervical spine but otherwise unchanged. Plan was to repeat imaging study in a year but the patient's out of pocket cost is $24,000. Patient is complaining of fatigue at work. Sometimes she goes to sleep while at work. Paperwork completed during the last visit so that she can get sit and stand desk. Current Outpatient Medications   Medication Sig    modafinil (PROVIGIL) 200 mg tablet Take 1 Tab by mouth every morning. Max Daily Amount: 200 mg.    REBIF, WITH ALBUMIN, 44 mcg/0.5 mL injection INJECT 44 MCG (0.5 ML) UNDER THE SKIN EVERY MONDAY, WEDNESDAY, AND FRIDAY    ibuprofen (MOTRIN) 600 mg tablet Take 1 Tab by mouth every eight (8) hours as needed for Pain.  ergocalciferol (VITAMIN D2) 50,000 unit capsule Take 50,000 Units by mouth.  norethindrone-ethinyl estradiol-iron (JUNEL FE 1.5/30, 28,) 1.5 mg-30 mcg (21)/75 mg (7) tablet Take 1 Tab by mouth daily.  LORATADINE (CLARITIN PO) Take 10 mg by mouth as needed. No current facility-administered medications for this visit.         Past Medical History:   Diagnosis Date    Ill-defined condition uterine fibroids    Multiple sclerosis (HCC)     Other ill-defined conditions(799.89)     sleep disorder and breast fibroids    Other ill-defined conditions(799.89)     left arm numbness at times    Unspecified breast disorder      Past Surgical History:   Procedure Laterality Date    ABDOMEN SURGERY PROC UNLISTED      myomectomy    HX GYN      fibroids    HX HEENT      tooth extraction    HX OTHER SURGICAL  2015    laparoscopoy      Family History   Problem Relation Age of Onset    Bleeding Prob Mother     Endometriosis Mother     Cancer Maternal Grandmother         lung    Hypertension Maternal Grandfather     Stroke Maternal Grandfather      Social History     Tobacco Use    Smoking status: Never Smoker    Smokeless tobacco: Never Used   Substance Use Topics    Alcohol use: Yes     Comment: occasionally    Drug use: Yes     Types: Marijuana       REVIEW OF SYSTEMS:   A ten system review of constitutional, cardiovascular, respiratory, musculoskeletal, endocrine, skin, SHEENT, genitourinary, psychiatric and neurologic systems was obtained and is unremarkable except left-sided numbness and right leg weakness    EXAMINATION:   Visit Vitals  /78   Pulse 91   Ht 5' 2\" (1.575 m)   Wt 139 lb (63 kg)   SpO2 98%   BMI 25.42 kg/m²        General:   General appearance: Pt is in no acute distress   Distal pulses are preserved    Neurological Examination:   Mental Status: AAO x3. Speech is fluent. Follows commands, has normal fund of knowledge, attention, short term recall, comprehension and insight. Cranial Nerves: Visual fields are full. PERRL, Extraocular movements are full. Facial sensation intact V1- V3. Facial movement intact, symmetric. Hearing intact to conversation. Palate elevates symmetrically. Shoulder shrug symmetric. Tongue midline. Motor: Strength is 5/5 in all 4 ext.     Sensation: Intact to pinprick sensation    Coordination/Cerebellar: Intact to finger-nose-finger     Skin: No significant bruising or lacerations. Laboratory review:   Results for orders placed or performed in visit on 08/14/17   CBC WITH AUTOMATED DIFF   Result Value Ref Range    WBC 4.7 3.4 - 10.8 x10E3/uL    RBC 3.98 3.77 - 5.28 x10E6/uL    HGB 12.5 11.1 - 15.9 g/dL    HCT 37.4 34.0 - 46.6 %    MCV 94 79 - 97 fL    MCH 31.4 26.6 - 33.0 pg    MCHC 33.4 31.5 - 35.7 g/dL    RDW 14.2 12.3 - 15.4 %    PLATELET 484 029 - 988 x10E3/uL    NEUTROPHILS 57 %    Lymphocytes 28 %    MONOCYTES 14 %    EOSINOPHILS 1 %    BASOPHILS 0 %    ABS. NEUTROPHILS 2.7 1.4 - 7.0 x10E3/uL    Abs Lymphocytes 1.3 0.7 - 3.1 x10E3/uL    ABS. MONOCYTES 0.6 0.1 - 0.9 x10E3/uL    ABS. EOSINOPHILS 0.1 0.0 - 0.4 x10E3/uL    ABS. BASOPHILS 0.0 0.0 - 0.2 x10E3/uL    IMMATURE GRANULOCYTES 0 %    ABS. IMM. GRANS. 0.0 0.0 - 0.1 [id number]/MF   METABOLIC PANEL, COMPREHENSIVE   Result Value Ref Range    Glucose 89 65 - 99 mg/dL    BUN 13 6 - 24 mg/dL    Creatinine 0.66 0.57 - 1.00 mg/dL    GFR est non- >59 mL/min/1.73    GFR est  >59 mL/min/1.73    BUN/Creatinine ratio 20 9 - 23    Sodium 141 134 - 144 mmol/L    Potassium 4.3 3.5 - 5.2 mmol/L    Chloride 100 96 - 106 mmol/L    CO2 28 18 - 29 mmol/L    Calcium 9.5 8.7 - 10.2 mg/dL    Protein, total 7.0 6.0 - 8.5 g/dL    Albumin 4.4 3.5 - 5.5 g/dL    GLOBULIN, TOTAL 2.6 1.5 - 4.5 g/dL    A-G Ratio 1.7 1.2 - 2.2    Bilirubin, total 0.4 0.0 - 1.2 mg/dL    Alk. phosphatase 57 39 - 117 IU/L    AST (SGOT) 19 0 - 40 IU/L    ALT (SGPT) 19 0 - 32 IU/L       Imaging review:  9/14/2017  MRI brain with and without contrast  Multifocal white matter T2 hyperintensity in pattern consistent with medical history of multiple sclerosis. No abdominal intracranial enhancement. MRI cervical spine with and without contrast  Greater extent of cord signal alteration in the interval for the equivocal midline anterior cord enhancement at C3 level. Finding consistent with patient's diagnosis of MS.     MRI thoracic spine with and without contrast  Stable extent of nonenhancing cord lesion and thoracic spine in keeping with patient's clinical diagnosis of MS.    10/4/2016  MRI thoracic spine with and without contrast  Multifocal thoracic cord signal alteration without evidence for cord enhancement. Lower cervical spine degenerative disc disease. No thoracic spine degenerative disc disease or facet arthrosis. 7/13/2016  MRI cervical spine with and without contrast  Abnormal T2 signal in the normal caliber cervical spinal cord spanning C1 through C7 and is compatible with provided diagnosis of chronic demyelinating disease. No MRI evidence of active demyelination. Documentation review:  None. Assessment/Plan:   Eligio Vallecillo is a 37 y.o. female who presented to the neurology office for management of multiple sclerosis. The patient is doing well on Rebif that she started around January of 2017. The patient was on Copaxone before but had a reaction. No new exacerbation. Continue Rebif. The patient is having fatigue at work. I am going to start the patient removed after 200 mg to be taken in the morning. Follow-up in 6 months     Over 25 minutes was spent with the patient and family of which > 50% of the visit was spent counseling on diagnosis, management, and treatment of the multiple sclerosis and fatigue        ICD-10-CM ICD-9-CM    1. Multiple sclerosis (HCC) G35 340 modafinil (PROVIGIL) 200 mg tablet   2. Fatigue, unspecified type R53.83 780.79 modafinil (PROVIGIL) 200 mg tablet        William Carnes MD  Neurologist    CC: Jose Eduardo Branham MD  Fax: [phone]    This note was created using voice recognition software. Despite editing, there may be syntax errors. This note will not be viewable in [address]

## 2018-12-12 ENCOUNTER — TELEPHONE (OUTPATIENT)
Dept: NEUROLOGY | Age: 43
End: 2018-12-12

## 2018-12-12 DIAGNOSIS — G35 MULTIPLE SCLEROSIS (HCC): Primary | ICD-10-CM

## 2018-12-12 NOTE — TELEPHONE ENCOUNTER
Submitted Rebif PA renewal  - approved to 12/12/19 from [employer]. Faxed auth to [employer] and [employer].

## 2018-12-17 ENCOUNTER — APPOINTMENT (OUTPATIENT)
Dept: GENERAL RADIOLOGY | Age: 43
End: 2018-12-17
Attending: EMERGENCY MEDICINE
Payer: COMMERCIAL

## 2018-12-17 ENCOUNTER — HOSPITAL ENCOUNTER (EMERGENCY)
Age: 43
Discharge: HOME | End: 2018-12-17
Attending: EMERGENCY MEDICINE
Payer: COMMERCIAL

## 2018-12-17 VITALS
BODY MASS INDEX: 25.4 KG/M2 | TEMPERATURE: 98.6 F | OXYGEN SATURATION: 100 % | HEIGHT: 62 IN | HEART RATE: 77 BPM | SYSTOLIC BLOOD PRESSURE: 115 MMHG | WEIGHT: 138.01 LBS | DIASTOLIC BLOOD PRESSURE: 69 MMHG | RESPIRATION RATE: 18 BRPM

## 2018-12-17 DIAGNOSIS — M25.552 PAIN IN LEFT HIP: Primary | ICD-10-CM

## 2018-12-17 DIAGNOSIS — G35 MULTIPLE SCLEROSIS (HCC): ICD-10-CM

## 2018-12-17 PROCEDURE — 73502 X-RAY EXAM HIP UNI 2-3 VIEWS: CPT

## 2018-12-17 PROCEDURE — 74011250637 HC RX REV CODE- 250/637: Performed by: EMERGENCY MEDICINE

## 2018-12-17 PROCEDURE — 72100 X-RAY EXAM L-S SPINE 2/3 VWS: CPT

## 2018-12-17 PROCEDURE — 99283 EMERGENCY DEPT VISIT LOW MDM: CPT

## 2018-12-17 RX ORDER — PREDNISONE 20 MG/1
40 TABLET ORAL DAILY
Qty: 10 TAB | Refills: 0 | Status: SHIPPED | OUTPATIENT
Start: 2018-12-17 | End: 2018-12-22

## 2018-12-17 RX ORDER — DIAZEPAM 5 MG/1
5 TABLET ORAL
Qty: 12 TAB | Refills: 0 | Status: SHIPPED | OUTPATIENT
Start: 2018-12-17 | End: 2019-06-03 | Stop reason: ALTCHOICE

## 2018-12-17 RX ORDER — HYDROCODONE BITARTRATE AND ACETAMINOPHEN 7.5; 325 MG/1; MG/1
1 TABLET ORAL
Status: COMPLETED | OUTPATIENT
Start: 2018-12-17 | End: 2018-12-17

## 2018-12-17 RX ORDER — DIAZEPAM 5 MG/1
5 TABLET ORAL
Status: COMPLETED | OUTPATIENT
Start: 2018-12-17 | End: 2018-12-17

## 2018-12-17 RX ADMIN — HYDROCODONE BITARTRATE AND ACETAMINOPHEN 1 TABLET: 7.5; 325 TABLET ORAL at 07:19

## 2018-12-17 RX ADMIN — DIAZEPAM 5 MG: 5 TABLET ORAL at 07:19

## 2018-12-17 NOTE — ED NOTES
MD Kain Steele reviewed discharge instructions with the patient. The patient verbalized understanding. Patient discharged home with stable vitals. Patient ambulatory out of ED with discharge instructions in hand. Opportunity for questions and clarification provided. No further complaints noted at this time.

## 2018-12-17 NOTE — DISCHARGE INSTRUCTIONS
Hip Pain: Care Instructions  Your Care Instructions    Hip pain may be caused by many things, including overuse, a fall, or a twisting movement. Another cause of hip pain is arthritis. Your pain may increase when you stand up, walk, or squat. The pain may come and go or may be constant. Home treatment can help relieve hip pain, swelling, and stiffness. If your pain is ongoing, you may need more tests and treatment. Follow-up care is a key part of your treatment and safety. Be sure to make and go to all appointments, and call your doctor if you are having problems. It's also a good idea to know your test results and keep a list of the medicines you take. How can you care for yourself at home? · Take pain medicines exactly as directed. ? If the doctor gave you a prescription medicine for pain, take it as prescribed. ? If you are not taking a prescription pain medicine, ask your doctor if you can take an over-the-counter medicine. · Rest and protect your hip. Take a break from any activity, including standing or walking, that may cause pain. · Put ice or a cold pack against your hip for 10 to 20 minutes at a time. Try to do this every 1 to 2 hours for the next 3 days (when you are awake) or until the swelling goes down. Put a thin cloth between the ice and your skin. · Sleep on your healthy side with a pillow between your knees, or sleep on your back with pillows under your knees. · If there is no swelling, you can put moist heat, a heating pad, or a warm cloth on your hip. Do gentle stretching exercises to help keep your hip flexible. · Learn how to prevent falls. Have your vision and hearing checked regularly. Wear slippers or shoes with a nonskid sole. · Stay at a healthy weight. · Wear comfortable shoes. When should you call for help? Call 911 anytime you think you may need emergency care.  For example, call if:    · You have sudden chest pain and shortness of breath, or you cough up blood.     · You are not able to stand or walk or bear weight.     · Your buttocks, legs, or feet feel numb or tingly.     · Your leg or foot is cool or pale or changes color.     · You have severe pain.    Call your doctor now or seek immediate medical care if:    · You have signs of infection, such as:  ? Increased pain, swelling, warmth, or redness in the hip area. ? Red streaks leading from the hip area. ? Pus draining from the hip area. ? A fever.     · You have signs of a blood clot, such as:  ? Pain in your calf, back of the knee, thigh, or groin. ? Redness and swelling in your leg or groin.     · You are not able to bend, straighten, or move your leg normally.     · You have trouble urinating or having bowel movements.    Watch closely for changes in your health, and be sure to contact your doctor if:    · You do not get better as expected. Where can you learn more? Go to http://juan jose-rolf.info/. Enter W716 in the search box to learn more about \"Hip Pain: Care Instructions. \"  Current as of: November 20, 2017  Content Version: 11.8  © 1222-4763 [employer]. Care instructions adapted under license by [employer] (which disclaims liability or warranty for this information). If you have questions about a medical condition or this instruction, always ask your healthcare professional. [place] [address] any warranty or liability for your use of this information.

## 2018-12-17 NOTE — ED PROVIDER NOTES
EMERGENCY DEPARTMENT HISTORY AND PHYSICAL EXAM      Date: 12/17/2018  Patient Name: Fredy Park    History of Presenting Illness     Chief Complaint   Patient presents with    Back Pain     ambulatory to triage, states that she injured her back while bending over approx 9 days ago and it has not been getting any better. Reports hearing and feeling a pop in her left hip. No relief with ibuprofen and cat fitzpatrick with no relief       History Provided By: Patient    HPI: Fredy Park, 37 y.o. female with PMHx significant for MS, presents ambulatory to the ED with cc of persistent left lower back pain x 2 weeks, but worsening this morning. Pt states 2 weeks ago she bent forward to  an oil can when she felt and heard a pop in the left hip. Since then pt reports applying cat fitzpatrick, soaking in epsom salt, taking aleve and ibuprofen, and massaging the area. Pt reports the medications and massage ease the pain, but it ultimately persists. She states she felt her back was improving, however upon getting out of bed this morning pt states she had sudden pain in the left lower back and left hip. She denies any heavy lifting or exertion yesterday that could contribute to her pain this morning. Pt denies prior hx of back injuries. She states she has not been on burst steroids for her MS in the past. Pt reports her LMP was 1.5-2 weeks ago and denies chance of pregnancy. She specifically denies any fever, n/v/d, dysuria, and frequency. There are no other complaints, changes, or physical findings at this time. PCP: Tanvi Shannon MD    Current Outpatient Medications   Medication Sig Dispense Refill    predniSONE (DELTASONE) 20 mg tablet Take 40 mg by mouth daily for 5 days. With Breakfast 10 Tab 0    diazePAM (VALIUM) 5 mg tablet Take 1 Tab by mouth every eight (8) hours as needed (spasm).  Max Daily Amount: 15 mg. 12 Tab 0    interferon beta-1a, albumin, (REBIF REBIDOSE) 44 mcg/0.5 mL pnij 1 Pen by SubCUTAneous route every Monday, Wednesday, Friday. 12 Syringe 11    modafinil (PROVIGIL) 200 mg tablet Take 1 Tab by mouth every morning. Max Daily Amount: 200 mg. 30 Tab 5    ibuprofen (MOTRIN) 600 mg tablet Take 1 Tab by mouth every eight (8) hours as needed for Pain. 20 Tab 0    ergocalciferol (VITAMIN D2) 50,000 unit capsule Take 50,000 Units by mouth.  norethindrone-ethinyl estradiol-iron (JUNEL FE 1.5/30, 28,) 1.5 mg-30 mcg (21)/75 mg (7) tablet Take 1 Tab by mouth daily.  LORATADINE (CLARITIN PO) Take 10 mg by mouth as needed. Past History     Past Medical History:  Past Medical History:   Diagnosis Date    Ill-defined condition     uterine fibroids    Multiple sclerosis (HCC)     Other ill-defined conditions(799.89)     sleep disorder and breast fibroids    Other ill-defined conditions(799.89)     left arm numbness at times    Unspecified breast disorder        Past Surgical History:  Past Surgical History:   Procedure Laterality Date    ABDOMEN SURGERY PROC UNLISTED      myomectomy    HX GYN      fibroids    HX HEENT      tooth extraction    HX OTHER SURGICAL  2015    laparoscopoy        Family History:  Family History   Problem Relation Age of Onset    Bleeding Prob Mother     Endometriosis Mother     Cancer Maternal Grandmother         lung    Hypertension Maternal Grandfather     Stroke Maternal Grandfather        Social History:  Social History     Tobacco Use    Smoking status: Never Smoker    Smokeless tobacco: Never Used   Substance Use Topics    Alcohol use: Yes     Comment: occasionally    Drug use: Yes     Types: Marijuana       Allergies:  No Known Allergies      Review of Systems   Review of Systems   Constitutional: Negative. Negative for appetite change, chills, fatigue and fever. HENT: Negative. Negative for congestion, rhinorrhea, sinus pressure and sore throat. Eyes: Negative. Respiratory: Negative.   Negative for cough, choking, chest tightness, shortness of breath and wheezing. Cardiovascular: Negative. Negative for chest pain, palpitations and leg swelling. Gastrointestinal: Negative for abdominal pain, constipation, diarrhea, nausea and vomiting. Endocrine: Negative. Genitourinary: Negative. Negative for difficulty urinating, dysuria, flank pain, frequency and urgency. Musculoskeletal: Positive for arthralgias and back pain. Skin: Negative. Neurological: Negative. Negative for dizziness, speech difficulty, weakness, light-headedness, numbness and headaches. Psychiatric/Behavioral: Negative. All other systems reviewed and are negative. Physical Exam   Physical Exam   Constitutional: She is oriented to person, place, and time. She appears well-developed and well-nourished. No distress. HENT:   Head: Normocephalic and atraumatic. Mouth/Throat: Oropharynx is clear and moist. No oropharyngeal exudate. Eyes: Conjunctivae and EOM are normal. Pupils are equal, round, and reactive to light. Neck: Normal range of motion. Neck supple. No JVD present. No tracheal deviation present. Cardiovascular: Normal rate, regular rhythm, normal heart sounds and intact distal pulses. No murmur heard. Pulmonary/Chest: Effort normal and breath sounds normal. No stridor. No respiratory distress. She has no wheezes. She has no rales. She exhibits no tenderness. Abdominal: Soft. She exhibits no distension. There is no tenderness. There is no rebound and no guarding. Musculoskeletal: Normal range of motion. She exhibits tenderness (Left iliac crest). She exhibits no edema. Neurological: She is alert and oriented to person, place, and time. No cranial nerve deficit. No gross motor or sensory deficits    Skin: Skin is warm and dry. She is not diaphoretic. Psychiatric: She has a normal mood and affect. Her behavior is normal.   Nursing note and vitals reviewed.       Diagnostic Study Results     Radiologic Studies -   XR HIP LT W OR WO PELV 2-3 VWS Final Result   EXAM: XR HIP LT W OR WO PELV 2-3 VWS     INDICATION: felt pop Left SI and left hip, 9 days ago, continued pain with  movement.     COMPARISON: None.     FINDINGS: An AP view of the pelvis and a frogleg lateral view of the left hip  demonstrate no fracture, dislocation or other abnormality.     IMPRESSION  IMPRESSION: Normal study. XR SPINE LUMB 2 OR 3 V   Final Result   INDICATION: Back Pain for 9 days following injury of back while bending over.     EXAM: Lumbar Spine:     Frontal, lateral and coned lateral L5-S1 views show no fracture or subluxation  of the lumbar spine. The disc spaces are preserved. Pedicles appear intact. The soft tissues are unremarkable.     IMPRESSION  IMPRESSION: Normal Lumbar Spine. Medical Decision Making   I am the first provider for this patient. I reviewed the vital signs, available nursing notes, past medical history, past surgical history, family history and social history. Vital Signs-Reviewed the patient's vital signs. Patient Vitals for the past 12 hrs:   Temp Pulse Resp BP SpO2   12/17/18 0639 98.6 °F (37 °C) 77 18 115/69 100 %       Pulse Oximetry Analysis - 100% on RA    Cardiac Monitor:   Rate: 77 bpm  Rhythm: Normal Sinus Rhythm      Records Reviewed: Nursing Notes, Old Medical Records, Previous Radiology Studies and Previous Laboratory Studies    Provider Notes (Medical Decision Making):   DDx: lumbar radiculopathy, lumbar stenosis, pelvic avulsion fracture, hip fracture, hip strain    ED Course:   Initial assessment performed. The patients presenting problems have been discussed, and they are in agreement with the care plan formulated and outlined with them. I have encouraged them to ask questions as they arise throughout their visit. 7:56 AM  After Norco and Valium pt states pain has resolved.    Written by Sheila Arnold ED scribe, as dictated by Ann Gao DO    Critical Care Time: 0    Disposition:  DISCHARGE NOTE:  8:31 AM  The patient is ready for discharge. The patients signs, symptoms, diagnosis, and instructions for discharge have been discussed and the pt has conveyed their understanding. The patient is to follow up as recommended with PCP or return to the ER should their symptoms worsen. Plan has been discussed and patient has conveyed their agreement. PLAN:  1. Discharge home   Current Discharge Medication List      START taking these medications    Details   predniSONE (DELTASONE) 20 mg tablet Take 40 mg by mouth daily for 5 days. With Breakfast  Qty: 10 Tab, Refills: 0      diazePAM (VALIUM) 5 mg tablet Take 1 Tab by mouth every eight (8) hours as needed (spasm). Max Daily Amount: 15 mg.  Qty: 12 Tab, Refills: 0    Associated Diagnoses: Pain in left hip           2. Follow-up Information     Follow up With Specialties Details Why Contact Info    Anny Corrigan MD [facility]  As needed [address] [place] [address]           Return to ED if worse     Diagnosis     Clinical Impression:   1. Pain in left hip        Attestations: This note is prepared by Jennifer Reyes, acting as Scribe for Ken Rodríguez, [address] [place] [address], DO: The scribe's documentation has been prepared under my direction and personally reviewed by me in its entirety. I confirm that the note above accurately reflects all work, treatment, procedures, and medical decision making performed by me.

## 2018-12-24 NOTE — TELEPHONE ENCOUNTER
This was sent 12/12/18 to [facility] for [facility] with a year's worth of refills.  Message sent to patient for this as well

## 2019-01-15 ENCOUNTER — HOSPITAL ENCOUNTER (OUTPATIENT)
Dept: PHYSICAL THERAPY | Age: 44
Discharge: HOME | End: 2019-01-15
Payer: COMMERCIAL

## 2019-01-15 PROCEDURE — 97110 THERAPEUTIC EXERCISES: CPT

## 2019-01-15 PROCEDURE — 97140 MANUAL THERAPY 1/> REGIONS: CPT

## 2019-01-15 PROCEDURE — 97161 PT EVAL LOW COMPLEX 20 MIN: CPT

## 2019-01-15 NOTE — PROGRESS NOTES
[facility] Physical Therapy [address] ([address]), [address] Alexsander Padilla Phone: [phone] Fax: [phone] Plan of Care/Statement of Necessity for Physical Therapy Services  2-15 Patient name: Katty Rooney  : 1975  Provider#: [id number] Referral source: Serjio Luz MD     
Medical/Treatment Diagnosis: Left sciatic nerve pain [M54.32] Prior Hospitalization: see medical history Comorbidities: Neurological disease Prior Level of Function: The patient completed 20 minutes of exercise seldom or never Medications: Verified on Patient Summary List 
 
Start of Care: 1/15/19      Onset Date: 18 The Plan of Care and following information is based on the information from the initial evaluation. Assessment/ key information: The Pt is a pleasant and motivated 37year old female who presents to therapy with L SI joint and hip pain and dysfunction. Based on examination, the Pt presents with decreased hip strength and stability, decreased core strength and stability, restrictions in her hip and lower back musculature flexibility, decreased thoracic spinal mobility, a R/L backward sacral torsion, and decreased activity tolerance and endurance. The patient would benefit from skilled physical therapy to help improve the above listed impairments to allow the patient to safely return to their prior level of function with less overall pain or risk of further injury. The patient has a good prognosis with skilled physical therapy. Evaluation Complexity History MEDIUM  Complexity : 1-2 comorbidities / personal factors will impact the outcome/ POC ; Examination MEDIUM Complexity : 3 Standardized tests and measures addressing body structure, function, activity limitation and / or participation in recreation  ;Presentation MEDIUM Complexity : Evolving with changing characteristics  ; Clinical Decision Making LOW Complexity : FOTO score of  Overall Complexity Rating: LOW Problem List: pain affecting function, decrease ROM, decrease strength, impaired gait/ balance, decrease ADL/ functional abilitiies, decrease activity tolerance and decrease flexibility/ joint mobility Treatment Plan may include any combination of the following: Therapeutic exercise, Therapeutic activities, Neuromuscular re-education, Physical agent/modality, Gait/balance training, Manual therapy, Patient education, Self Care training, Functional mobility training, Home safety training and Stair training Patient / Family readiness to learn indicated by: asking questions and interest 
Persons(s) to be included in education: patient (P) Barriers to Learning/Limitations: None Patient Goal (s): no discomfort Patient Self Reported Health Status: good Rehabilitation Potential: good Short Term Goals: To be accomplished in 4 treatments: 
1. The Pt will be independent and compliant with her HEP. Long Term Goals: To be accomplished in 8 treatments: 
1. The Pt will score the MCII on her FOTO survey demonstrating improved overall function (79 to 81 points). 2. The Pt will be able to stand >/= 30 minutes without increased L hip pain or discomfort to allow the Pt to be able to perform standing ADLs with less pain or discomfort. Frequency / Duration: Patient to be seen 1 times per week for 8 treatments. Patient/ Caregiver education and instruction: self care, activity modification and exercises 
 
[x]  Plan of care has been reviewed with ELIANE Toth PT 1/15/2019 11:31 AM 
 
________________________________________________________________________ I certify that the above Therapy Services are being furnished while the patient is under my care. I agree with the treatment plan and certify that this therapy is necessary. [de-identified] Signature:____________________  Date:____________Time: _________

## 2019-01-15 NOTE — PROGRESS NOTES
PT INITIAL EVALUATION NOTE - [facility] 2-15 Patient Name: Truman Holder Date:1/15/2019 : 1975 [x]  Patient  Verified Payor: BLUE CROSS / Plan: [facility] PPO / Product Type: PPO / In time: 8:02 AM  Out time: 9:05 AM 
Total Treatment Time (min): 63 minutes Total Timed Codes (min): 25 minutes 1:1 Treatment Time ( only): 63 minutes Visit #: 1 Treatment Area: Left sciatic nerve pain [M54.32] SUBJECTIVE Pain Level (0-10 scale): 0/10 Any medication changes, allergies to medications, adverse drug reactions, diagnosis change, or new procedure performed?: [] No    [x] Yes (see summary sheet for update) Subjective:   
18- she was reaching for a gas can and she felt and heard a pop in the L hip with immediate pain that radiated down the L thigh. She reports that the pain was intermittent and radiates down the L thigh and encompasses the anterior and posterior hip and posterior/lateral thigh. She denies any pain distal to the thigh or numbness or tingling in her LEs. Currently, the pain is still in the same area, but has improved greatly and is no longer as intense because she has been taking steroids. She started steroids (she has taken them for 5 days and has 2 days left). Aggravating factors include: putting more weight through the L LE when standing and standing > 10 minutes. Relieving factors include: movement and massage. She has been diagnosed relapsing remitting MS in 2017- she reports that it is well controlled and this does not feel like a flare up. She is independent with all ADLs. She works as an  at [employer]- she does a lot of computer and desk work. She is working full duty without restrictions and denies any problems. She lives in a 1-story home with 3 stairs to enter- she denies any difficulties navigating the stairs.  She has a disabled dad that she helps to care for- she does more of the cooking and cleaning. She is sleeping well at night; she sleeps on her R side primarily or her back. PMH: MS. OBJECTIVE/EXAMINATION Posture:  Unremarkable Other Observations: N/A Gait and Functional Mobility:  Decreased R foot clearance and decreased R heel strike Lumbar ROM: 
 Flexion: [facility] Extension: [facility] Side Bending: Right: WFL   Left: [facility] Rotation: Right: WFL   Left: Mild Balance Assessment: Mild deficits in balance and neuromuscular control in single limb stance B Squat Assessment: 
 Double Leg Deviate to the R, quadriceps dominant Single Leg NT Neurological: Sensations: Intact to light touch sensation L1-S1 bilaterally Flexibility: Mild restrictions in hamstrings, hip internal and external rotators, quadriceps, iliopsoas, and gastrocnemius/soleus complex bilaterally Right Knee:  AROM [facility] Left  Knee:  AROM [facility] LOWER QUARTER   MUSCLE STRENGTH 
KEY       R  L 
0 - No Contraction  Hip flex  4  4 
1 - Trace          er    4  4- 
2 - Poor          ir   4  4 
3 - Fair           abd  4  4 
4 - Good          ext  4  4 
5 - Normal   Knee flex  4+  4+ Ext  5  5 Ankle DF  4  5 
              PF  5  5 Gluteal activation: The Pt has improper gluteal activation with hip extension bilaterally Joint Mobility Assessment: R/L backward sacral torsion; mild hypomobility of thoracic spine Palpation: TTP along L glut med and piriformis and R PSIS Special Tests:Varus: NT     SLR: (-) B Valgus: NT     Slump: NT 
  Isiah's: NT    Avtar: NT Anterior Drawer: NT    Obers: NT 
  Posterior Drawer: NT    Clonus: (-) B Lachman's: NT 
 
15 min Therapeutic Exercise:  [x] See flow sheet :  
Rationale: increase ROM, increase strength, improve coordination, improve balance and increase proprioception to improve the patients ability to perform ADLs with less pain or discomfort. 10 min Manual Therapy: Shot gun technique and MET for a R/L backward sacral torsion Rationale: decrease pain, increase ROM and increase tissue extensibility to improve the patients ability to perform ADLs with less pain or discomfort. With 
 [x] TE 
 [] TA 
 [] neuro [x] other: Patient Education: [x] Review HEP [] Progressed/Changed HEP based on:  
[] positioning   [] body mechanics   [] transfers   [] heat/ice application   
[x] other: Pt educated on diagnosis and prognosis with therapy Other Objective/Functional Measures: FOTO 79/100 Pain Level (0-10 scale) post treatment: 0/10 ASSESSMENT/Changes in Function:  
 
[x]  See Plan of Care Missy Alvarez, PT 1/15/2019  8:02 AM

## 2019-01-22 DIAGNOSIS — G35 MULTIPLE SCLEROSIS (HCC): Primary | ICD-10-CM

## 2019-01-23 DIAGNOSIS — G35 MULTIPLE SCLEROSIS (HCC): ICD-10-CM

## 2019-01-29 ENCOUNTER — TELEPHONE (OUTPATIENT)
Dept: NEUROLOGY | Age: 44
End: 2019-01-29

## 2019-01-29 DIAGNOSIS — G35 MULTIPLE SCLEROSIS (HCC): ICD-10-CM

## 2019-01-29 NOTE — TELEPHONE ENCOUNTER
"  Alcohol Intoxication  Alcohol intoxication occurs when you drink alcohol faster than your liver can remove it from your system. The following facts are important to remember:    It can take 10 minutes or more to start to feel the effects of a drink, so you can easily get more intoxicated than you intended.    One drink may be more than 1 serving of alcohol. Depending on the drink, it can be 2 to 4 servings.    It takes about an hour for your body to metabolize (clear) 1 serving. If you have more than 1 drink, it can take a couple of hours or more.    Many things affect how drinks will affect you, including whether you ve eaten, how fast you drink, your size, how much you normally drink (or not), medicines you take, chronic diseases you have, and gender.  Signs and symptoms of alcohol poisoning  The following are signs and symptoms of alcohol poisoning:  Mild impairment    Reduced inhibitions    Slurred speech    Drowsiness    Decreased fine motor skills  Moderate impairment    Erratic behavior, aggression, depression    Impaired judgment    Confusion    Concentration difficulties    Coordination problems  Severe impairment    Vomiting    Seizures    Unconsciousness    Cold, clammy    Slow or irregular breathing    Hypothermia (low body temperature)    Coma  Health effects  Alcohol abuse causes health problems. Sometimes this can happen after only drinking a  little.\" There is no set number of drinks or amount of alcohol that defines too much. The more you drink at one time, and the more frequently you drink determine both the short-term and long-term health effects. It affects all parts of your body and your health, including your:    Brain. Alcohol is a central nervous system depressant. It can damage parts of the brain that affect your balance, memory, thinking, and emotions. It can cause memory loss, blackouts, depression, agitation, sleep cycle changes, and seizures. These changes may or may not be " Noted reversible.    Heart and vascular system. Alcohol affects multiple areas. It can damage heart muscle causing cardiomyopathy, which is a weakening and stretching of the heart muscle. This can lead to trouble breathing, an irregular heartbeat, atrial fibrillation, leg swelling, and heart failure. It makes the blood vessels stiffen causing hypertension (high blood pressure). All of these problems increase your risk of having heart attacks or strokes.    Liver. Alcohol causes fat to build up in the liver, affecting its normal function. This increases the risk for hepatitis, leading to abdominal pain, appetite loss, jaundice, bleeding problems, liver fibrosis, and cirrhosis. This in turn can affect your ability to fight off infections, and can cause diabetes. The liver changes prevent it from removing toxins in your blood that can cause encephalopathy. Signs of this are confusion, altered level of consciousness, personality changes, memory loss, seizures, coma, and death.    Pancreas. Alcohol can cause inflammation of the pancreas, or pancreatitis. This can cause pain in your abdomen, fever, and diabetes.    Immune system. Alcohol weakens your immune system in a number of ways. It suppresses your immune system making it harder to fight off infections and colds. You will also have a higher risk of certain infections like pneumonia and tuberculosis.    Cancer risk. Alcohol raises your risk of cancer of the mouth, esophagus, pharynx, larynx, liver, and breast.    Sexual function. Alcohol abuse can also lead to sexual problems.  Alcohol use during pregnancy may cause permanent damage to the growing baby.  Home care  The following guidelines will help you care for yourself at home:    Don't drink any more alcohol.    Don't drive until all effects of the alcohol have worn off.    Don't operate machinery that can cause injuries.    Get lots of rest over the next few days. Drink plenty of water and other non-alcoholic liquids.  Try to eat regular meals.    If you have been drinking heavily on a daily basis, you may go through alcohol withdrawal. The usual symptoms last 3 to 4 days and may include nervousness, shakiness, nausea, sweating, sleeplessness, and can even cause seizures and a serious withdrawal symptom called delirium tremens, or DTs. During this time, it is best that you stay with family or friends who can help and support you. You can also admit yourself to a residential detox program. If your symptoms are severe (seizures, severe shakiness, confusion), contact your doctor or call an ambulance for help (see below).   Follow-up care  If alcohol is a problem in your life, these are some organizations that can help you:    Alcoholics Anonymous offers support through a self-help fellowship. There are no dues or fees. See the Yellow Pages and call for time and place of meetings. Find AA online at www.aa.org.    [facility] offers support to families of alcohol users. Contact 658-456-6898, or online at www.al-anojennifer.org.    National [place] on Alcoholism and Drug Dependence can be reached at 736-693-9994, or online at www.ncadd.org.    There are also inpatient and residential alcohol detox programs. Check the Internet or phonebook Yellow Pages under  Drug Abuse and Treatment Centers.   Call 911  Call 911 if any of these occur:    Trouble breathing or slow irregular breathing    Chest pain    Sudden weakness on one side of your body or sudden trouble speaking    Heavy bleeding or vomiting blood    Very drowsy or trouble awakening    Fainting or loss of consciousness    Rapid heart rate    Seizure  When to seek medical advice  Call your healthcare provider right away if any of these occur:    Severe shakiness     Fever of 100.4 F (38 C) or higher, or as directed by your healthcare provider    Confusion or hallucinations (seeing, hearing, or feeling things that are not there)    Pain in your upper abdomen that gets worse    Repeated  vomiting  Date Last Reviewed: 6/1/2016 2000-2017 [employer]. [address] [place] [address], [place] [address]. All rights reserved. This information is not intended as a substitute for professional medical care. Always follow your healthcare professional's instructions.

## 2019-01-29 NOTE — TELEPHONE ENCOUNTER
From  [facility] To  [facility] Nurses Sent  1/28/2019  5:18 PM   ----- Message from [address] [place] [address] [address], Generic sent at 1/28/2019  5:18 PM EST -----     Please contact [facility] so that I can get the syringes. These injectors do not work for me. I tried to take my shot with the injector today (in the belly) and did everything how I was supposed to but it was unsuccessful. I got pin-pricked by the needle but the medicine did not get injected properly. The guard blocked it. I must insist that you take care of this as soon as  you can please as I do not want to miss another injection, which is on Wednesday. Thank you for anything you can do to get this expedited    Dr Delgado Poles patient to clarify exactly what she needed. Patient wants to use prefilled syringes as she is able to inject it better. Patient does NOT want the auto injector.     Please advise

## 2019-01-29 NOTE — TELEPHONE ENCOUNTER
----- Message from [place] sent at 1/29/2019  3:11 PM EST -----  Regarding: /Telephone  Pt called requesting a call back informing she will like the prefilled syringe verses the auto injectable. Pt best contact number is [phone] .

## 2019-01-31 NOTE — TELEPHONE ENCOUNTER
I called the patient and she received the auto injector instead of her prefilled syringe that was easier to use. I called [facility] and found that they just sent out the auto injector again. I then called [facility] and filled out a new script and faxed in to them. I called the patient and hopefully [facility] will be able to send the prefilled syringes to the patient while waiting for next month and insurance will send out the next fill.   I did correct with [employer] and should not be sent out incorrectly again  I asked the patient to call me back if there are any further problems

## 2019-02-06 ENCOUNTER — TELEPHONE (OUTPATIENT)
Dept: NEUROLOGY | Age: 44
End: 2019-02-06

## 2019-02-06 NOTE — TELEPHONE ENCOUNTER
Spoke with patient to let her know  Nurse from [address] [place] [address] will call patient to come out and get proper syringes ordered and educate her on the syringes she already has.     Left voicemail to [facility] nurse educator [phone] to call patient to set a time to come out to resident to find out exactly what syringes patients prefers and how to inject medication

## 2019-02-11 ENCOUNTER — TELEPHONE (OUTPATIENT)
Dept: NEUROLOGY | Age: 44
End: 2019-02-11

## 2019-02-11 NOTE — TELEPHONE ENCOUNTER
Rec'd pa request for Rebif. This was already approved and good to 12/19/19. Same insurance. Faxing this to [facility] as an [employer].

## 2019-02-27 NOTE — ANCILLARY DISCHARGE INSTRUCTIONS
[employer] [facility] [address]), [address] [place] Phone: [phone] Fax: [phone] Discharge Summary  2-15 Patient name: Isiah Lewis  : 1975  Provider#: [id number] Referral source: Rochelle Little MD     
Medical/Treatment Diagnosis: Sciatica, left side [M54.32] Prior Hospitalization: see medical history Comorbidities: See Plan of Care Prior Level of Function:See Plan of Care Medications: Verified on Patient Summary List 
 
Start of Care:  1/15/19      Onset Date:18 Visits from Start of Care: 1     Missed Visits: 0 Reporting Period : 1/15/19 to 1/15/19 ASSESSMENT/SUMMARY OF CARE: Pt is discharged today, 2019, as they have stopped attending therapy. Final objective data and outcomes were unable to be obtained. RECOMMENDATIONS: 
[x]Discontinue therapy: []Patient has reached or is progressing toward set goals [x]Patient is non-compliant or has abdicated 
    []Due to lack of appreciable progress towards set goals []Other Ilan Branch, PT 2019

## 2019-03-01 ENCOUNTER — TELEPHONE (OUTPATIENT)
Dept: NEUROLOGY | Age: 44
End: 2019-03-01

## 2019-03-01 NOTE — TELEPHONE ENCOUNTER
Sent PA for Modafinil via [facility] to [facility] Case ID: [id number] - Rx #: [id number] Need help?  Call us at [phone]  Status  Sent to [facility]  Drug

## 2019-04-26 ENCOUNTER — TELEPHONE (OUTPATIENT)
Dept: NEUROLOGY | Age: 44
End: 2019-04-26

## 2019-06-03 ENCOUNTER — OFFICE VISIT (OUTPATIENT)
Dept: NEUROLOGY | Age: 44
End: 2019-06-03

## 2019-06-03 VITALS
OXYGEN SATURATION: 98 % | HEIGHT: 62 IN | BODY MASS INDEX: 25.76 KG/M2 | HEART RATE: 96 BPM | DIASTOLIC BLOOD PRESSURE: 58 MMHG | WEIGHT: 140 LBS | SYSTOLIC BLOOD PRESSURE: 118 MMHG

## 2019-06-03 DIAGNOSIS — G35 MULTIPLE SCLEROSIS (HCC): Primary | ICD-10-CM

## 2019-06-03 DIAGNOSIS — R53.83 FATIGUE, UNSPECIFIED TYPE: ICD-10-CM

## 2019-06-03 DIAGNOSIS — R20.0 BILATERAL HAND NUMBNESS: ICD-10-CM

## 2019-06-03 RX ORDER — GLUCOSAMINE/CHONDR SU A SOD 750-600 MG
10000 TABLET ORAL DAILY
COMMUNITY

## 2019-06-03 NOTE — PATIENT INSTRUCTIONS

## 2019-06-03 NOTE — LETTER
6/3/19 Patient: Terry Null YOB: 1975 Date of Visit: 6/3/2019 Eri Ayon MD 
[address] [place] [id number] VIA Facsimile: [phone] Dear Eri Ayon MD, Thank you for referring Ms. Isis Garcia to [address] [place] [address] for evaluation. My notes for this consultation are attached. If you have questions, please do not hesitate to call me. I look forward to following your patient along with you. Sincerely, Ronell Bumpers, MD

## 2019-06-03 NOTE — PROGRESS NOTES
Neurology follow-up note    Chief Complaint   Patient presents with   aCte Cuellar is a 37 y.o. female who presented to the neurology office for management of multiple sclerosis. At the age of 29 she did have left-sided numbness that had been improving and now she does have decreased sensation in her left hand. She was diagnosed with multiple sclerosis in November 2016 as at that time she did have sudden onset right leg weakness and it gets weak and fatigued. In January she did have diplopia. All the symptoms are better now. She initially tried Copaxone for 2 weeks but had hot flashes and was feeling sick and hence was switched over to Rebif December/January 2017 and has been doing better. No recent exacerbations. Patient last MRI was in 2017. Patient is complaining of fatigue at work. Sometimes she goes to sleep while at work. Patient now has a sit and stand desk and that helps the patient. Since the last visit, patient has had 2 episodes where she had numbness in her left hand and later on in April numbness in her right hand. No weakness. Symptoms have resolved. Current Outpatient Medications   Medication Sig    Biotin 2,500 mcg cap Take  by mouth.  interferon beta-1a, albumin, (REBIF, WITH ALBUMIN,) 44 mcg/0.5 mL injection 0.5 mL by SubCUTAneous route every Monday, Wednesday, Friday. Please dispense prefilled syringe/Rebiject    norethindrone-ethinyl estradiol-iron (JUNEL FE 1.5/30, 28,) 1.5 mg-30 mcg (21)/75 mg (7) tablet Take 1 Tab by mouth daily.  LORATADINE (CLARITIN PO) Take 10 mg by mouth as needed. No current facility-administered medications for this visit.         Past Medical History:   Diagnosis Date    Ill-defined condition     uterine fibroids    Multiple sclerosis (HCC)     Other ill-defined conditions(697.42)     sleep disorder and breast fibroids    Other ill-defined conditions(179.89)     left arm numbness at times   [place] Unspecified breast disorder      Past Surgical History:   Procedure Laterality Date    ABDOMEN SURGERY PROC UNLISTED      myomectomy    HX GYN      fibroids    HX HEENT      tooth extraction    HX OTHER SURGICAL  2015    laparoscopoy      Family History   Problem Relation Age of Onset    Bleeding Prob Mother     Endometriosis Mother     Cancer Maternal Grandmother         lung    Hypertension Maternal Grandfather     Stroke Maternal Grandfather      Social History     Tobacco Use    Smoking status: Never Smoker    Smokeless tobacco: Never Used   Substance Use Topics    Alcohol use: Yes     Comment: occasionally    Drug use: Yes     Types: Marijuana       REVIEW OF SYSTEMS:   A ten system review of constitutional, cardiovascular, respiratory, musculoskeletal, endocrine, skin, SHEENT, genitourinary, psychiatric and neurologic systems was obtained and is unremarkable except left-sided numbness and right leg weakness    EXAMINATION:   Visit Vitals  /58   Pulse 96   Ht 5' 2\" (1.575 m)   Wt 140 lb (63.5 kg)   SpO2 98%   BMI 25.61 kg/m²        General:   General appearance: Pt is in no acute distress   Distal pulses are preserved    Neurological Examination:   Mental Status: AAO x3. Speech is fluent. Follows commands, has normal fund of knowledge, attention, short term recall, comprehension and insight. Cranial Nerves: Visual fields are full. PERRL, Extraocular movements are full. Facial sensation intact V1- V3. Facial movement intact, symmetric. Hearing intact to conversation. Palate elevates symmetrically. Shoulder shrug symmetric. Tongue midline. Motor: Strength is 5/5 in all 4 ext. Sensation: Intact to pinprick sensation    Coordination/Cerebellar: Intact to finger-nose-finger     Skin: No significant bruising or lacerations.     Laboratory review:   Results for orders placed or performed in visit on 08/14/17   CBC WITH AUTOMATED DIFF   Result Value Ref Range    WBC 4.7 3.4 - 10.8 x10E3/uL RBC 3.98 3.77 - 5.28 x10E6/uL    HGB 12.5 11.1 - 15.9 g/dL    HCT 37.4 34.0 - 46.6 %    MCV 94 79 - 97 fL    MCH 31.4 26.6 - 33.0 pg    MCHC 33.4 31.5 - 35.7 g/dL    RDW 14.2 12.3 - 15.4 %    PLATELET 293 595 - 232 x10E3/uL    NEUTROPHILS 57 %    Lymphocytes 28 %    MONOCYTES 14 %    EOSINOPHILS 1 %    BASOPHILS 0 %    ABS. NEUTROPHILS 2.7 1.4 - 7.0 x10E3/uL    Abs Lymphocytes 1.3 0.7 - 3.1 x10E3/uL    ABS. MONOCYTES 0.6 0.1 - 0.9 x10E3/uL    ABS. EOSINOPHILS 0.1 0.0 - 0.4 x10E3/uL    ABS. BASOPHILS 0.0 0.0 - 0.2 x10E3/uL    IMMATURE GRANULOCYTES 0 %    ABS. IMM. GRANS. 0.0 0.0 - 0.1 M45Y6/XP   METABOLIC PANEL, COMPREHENSIVE   Result Value Ref Range    Glucose 89 65 - 99 mg/dL    BUN 13 6 - 24 mg/dL    Creatinine 0.66 0.57 - 1.00 mg/dL    GFR est non- >59 mL/min/1.73    GFR est  >59 mL/min/1.73    BUN/Creatinine ratio 20 9 - 23    Sodium 141 134 - 144 mmol/L    Potassium 4.3 3.5 - 5.2 mmol/L    Chloride 100 96 - 106 mmol/L    CO2 28 18 - 29 mmol/L    Calcium 9.5 8.7 - 10.2 mg/dL    Protein, total 7.0 6.0 - 8.5 g/dL    Albumin 4.4 3.5 - 5.5 g/dL    GLOBULIN, TOTAL 2.6 1.5 - 4.5 g/dL    A-G Ratio 1.7 1.2 - 2.2    Bilirubin, total 0.4 0.0 - 1.2 mg/dL    Alk. phosphatase 57 39 - 117 IU/L    AST (SGOT) 19 0 - 40 IU/L    ALT (SGPT) 19 0 - 32 IU/L       Imaging review:  9/14/2017  MRI brain with and without contrast  Multifocal white matter T2 hyperintensity in pattern consistent with medical history of multiple sclerosis. No abdominal intracranial enhancement. MRI cervical spine with and without contrast  Greater extent of cord signal alteration in the interval for the equivocal midline anterior cord enhancement at C3 level. Finding consistent with patient's diagnosis of MS.     MRI thoracic spine with and without contrast  Stable extent of nonenhancing cord lesion and thoracic spine in keeping with patient's clinical diagnosis of MS.    10/4/2016  MRI thoracic spine with and without contrast  Multifocal thoracic cord signal alteration without evidence for cord enhancement. Lower cervical spine degenerative disc disease. No thoracic spine degenerative disc disease or facet arthrosis. 7/13/2016  MRI cervical spine with and without contrast  Abnormal T2 signal in the normal caliber cervical spinal cord spanning C1 through C7 and is compatible with provided diagnosis of chronic demyelinating disease. No MRI evidence of active demyelination. Documentation review:  None    Assessment/Plan:   Gini Kim is a 37 y.o. female who presented to the neurology office for management of multiple sclerosis. The patient is doing well on Rebif that she started around January of 2017. The patient was on Copaxone before but had a reaction. No new exacerbation. Continue Rebif. The patient is having fatigue at work. Modafinil was not approved by insurance. She does have a sit and stand desk and that helps her. She has had 2 episodes where she has had hand numbness which has resolved now. Will get MRI of the brain and cervical spine and scheduled her for EMG/nerve conduction study    Follow-up in 3 months     Over 25 minutes was spent with the patient and family of which > 50% of the visit was spent counseling on diagnosis, management, and treatment of the multiple sclerosis and fatigue        ICD-10-CM ICD-9-CM    1. Multiple sclerosis ([facility] [address] G35 340 MRI BRAIN W WO CONT      MRI CERV SPINE W WO CONT   2. Bilateral hand numbness R20.0 782. 0 EMG LIMITED   3. Fatigue, unspecified type R53.83 780.79         Francesca Arrieta MD  Neurologist    CC: Micaela Richter MD  Fax: [phone]    This note was created using voice recognition software. Despite editing, there may be syntax errors. This note will not be viewable in [address]

## 2019-06-27 ENCOUNTER — OFFICE VISIT (OUTPATIENT)
Dept: NEUROLOGY | Age: 44
End: 2019-06-27

## 2019-06-27 VITALS
HEIGHT: 62 IN | OXYGEN SATURATION: 99 % | WEIGHT: 140 LBS | HEART RATE: 106 BPM | DIASTOLIC BLOOD PRESSURE: 87 MMHG | SYSTOLIC BLOOD PRESSURE: 119 MMHG | BODY MASS INDEX: 25.76 KG/M2

## 2019-06-27 DIAGNOSIS — G56.02 CARPAL TUNNEL SYNDROME OF LEFT WRIST: Primary | ICD-10-CM

## 2019-06-27 NOTE — PROGRESS NOTES
Patient did have EMG/nerve conduction study today which did show a mild left carpal tunnel syndrome. Prescribing left wrist splint.

## 2019-06-27 NOTE — PROCEDURES
[facility] at [facility]        Tel: [phone] ? Fax: [phone]    ELECTRODIAGNOSTIC REPORT      Test Date:  2019    Patient: Arun Goldsmith : 1975 Physician: Nicol Andrea M.D.   ID#: [id number] SEX: Female Ref. Phys: Nicol Andrea M.D. Patient History / Exam:  Kenzie Johnson 37 y.o. female presents with left hand numbness. Query left carpal tunnel syndrome    EMG & NCV Findings:  Evaluation of the left median motor and the right median motor nerves showed normal distal onset latency (L3.4, R2.8 ms), normal amplitude (L6.6, R7.0 mV), and normal conduction velocity (Elbow-Wrist, L67, R64 m/s). The right ulnar motor nerve showed normal distal onset latency (3.1 ms), normal amplitude (9.8 mV), normal conduction velocity (B Elbow-Wrist, 58 m/s), and normal conduction velocity (A Elbow-B Elbow, 53 m/s). The left median sensory, the right median sensory, and the right ulnar sensory nerves showed normal distal peak latency (L3.6, R3.0, R3.0 ms) and normal amplitude (L50.6, R39.6, R28.3 µV). The left median/ulnar (dig IV) comparison and the right median/ulnar (dig IV) comparison nerves showed normal onset latency difference (Median Wr-Ulnar Wr, L0.4, R0.4 ms). All left vs. right side differences were within normal limits. All F Wave latencies were within normal limits. All examined muscles (as indicated in the following table) showed no evidence of electrical instability. Impression: This is an abnormal study. There is electrophysiological evidence of a mild left median neuropathy at the wrist.  There is no electrophysiological evidence of a left cervical radiculopathy. ___________________________  S.  Yolanda Garcia M.D.    Nerve Conduction Studies  Anti Sensory Summary Table     Stim Site NR Peak (ms) Norm Peak (ms) P-T Amp (µV) Norm P-T Amp Site1 Site2 Dist (cm)   Left Median Anti Sensory (2nd Digit)  35.5°C   Wrist    3.6 <4 50.6 >13 Wrist 2nd Digit 14.0 Right Median Anti Sensory (2nd Digit)  35.3°C   Wrist    3.0 <4 39.6 >13 Wrist 2nd Digit 14.0   Right Ulnar Anti Sensory (5th Digit)  35.2°C   Wrist    3.0 <4.0 28.3 >9 Wrist 5th Digit 14.0     Motor Summary Table     Stim Site NR Onset (ms) Norm Onset (ms) O-P Amp (mV) Norm O-P Amp P-T Amp (mV) Site1 Site2 Dist (cm) Bishnu (m/s)   Left Median Motor (Abd Poll Brev)  35.4°C   Wrist    3.4 <4.5 6.6 >4.1 13.4 Wrist Abd Poll Brev 8.0    Elbow    7.0  5.7  11.8 Elbow Wrist 24.0 67   Right Median Motor (Abd Poll Brev)  34°C   Wrist    2.8 <4.5 7.0 >4.1 10.9 Wrist Abd Poll Brev 8.0    Elbow    6.4  6.1  10.4 Elbow Wrist 23.0 64   Right Ulnar Motor (Abd Dig Minimi)  35.3°C   Wrist    3.1 <3.1 9.8 >7.0 14.5 Wrist Abd Dig Minimi 8.0    B Elbow    7.1  8.9  12.8 B Elbow Wrist 23.0 58   A Elbow    9.0  8.6  12.5 A Elbow B Elbow 10.0 53     Comparison Summary Table     Stim Site NR Peak (ms) P-T Amp (µV) Site1 Site2 Dist (cm) Delta-0 (ms)   Left Median/Ulnar Dig IV Comparison (Digit 4)  35.5°C   Median Wr    1.8 23.2 Median Wr Ulnar Wr 0.0 0.4   Ulnar Wr    1.3 7.1       Right Median/Ulnar Dig IV Comparison (Digit 4)  35.5°C   Median Wr    1.5 11.8 Median Wr Ulnar Wr 0.0 0.4   Ulnar Wr    1.7 43.0         F Wave Studies     NR F-Lat (ms) Lat Norm (ms) L-R F-Lat (ms) L-R Lat Norm   Right Ulnar (Mrkrs) (Abd Dig Min)  35.6°C      26.42 <32  <2.5       EMG     Side Muscle Nerve Root Ins Act Fibs Psw Recrt Duration Amp Poly Comment   Left 1stDorInt Ulnar C8-T1 Nml Nml Nml Nml Nml Nml Nml    Left PronatorTeres Median C6-7 Nml Nml Nml Nml Nml Nml Nml    Left Biceps Musculocut C5-6 Nml Nml Nml Nml Nml Nml Nml    Left Triceps Radial C6-7-8 Nml Nml Nml Nml Nml Nml Nml    Left Deltoid Axillary C5-6 Nml Nml Nml Nml Nml Nml Nml    Left Abd Poll Brev Median C8-T1 Nml Nml Nml Nml Nml Nml Nml      Waveforms:

## 2019-07-08 ENCOUNTER — HOSPITAL ENCOUNTER (OUTPATIENT)
Dept: MRI IMAGING | Age: 44
Discharge: HOME | End: 2019-07-08
Attending: PSYCHIATRY & NEUROLOGY
Payer: COMMERCIAL

## 2019-07-08 DIAGNOSIS — G35 MULTIPLE SCLEROSIS (HCC): ICD-10-CM

## 2019-07-08 PROCEDURE — A9575 INJ GADOTERATE MEGLUMI 0.1ML: HCPCS | Performed by: PSYCHIATRY & NEUROLOGY

## 2019-07-08 PROCEDURE — 72156 MRI NECK SPINE W/O & W/DYE: CPT

## 2019-07-08 PROCEDURE — 74011250636 HC RX REV CODE- 250/636: Performed by: PSYCHIATRY & NEUROLOGY

## 2019-07-08 PROCEDURE — 70553 MRI BRAIN STEM W/O & W/DYE: CPT

## 2019-07-08 RX ORDER — GADOTERATE MEGLUMINE 376.9 MG/ML
13 INJECTION INTRAVENOUS
Status: COMPLETED | OUTPATIENT
Start: 2019-07-08 | End: 2019-07-08

## 2019-07-08 RX ADMIN — GADOTERATE MEGLUMINE 13 ML: 376.9 INJECTION INTRAVENOUS at 12:00

## 2019-07-09 ENCOUNTER — TELEPHONE (OUTPATIENT)
Dept: NEUROLOGY | Age: 44
End: 2019-07-09

## 2019-07-09 DIAGNOSIS — G56.02 CARPAL TUNNEL SYNDROME OF LEFT WRIST: ICD-10-CM

## 2019-08-05 ENCOUNTER — TELEPHONE (OUTPATIENT)
Dept: NEUROLOGY | Age: 44
End: 2019-08-05

## 2019-08-05 NOTE — TELEPHONE ENCOUNTER
Spoke with patient to let her know Dr Yuri Colmenares advises she can try ordering a wrist splint on [facility]

## 2019-08-06 ENCOUNTER — HOSPITAL ENCOUNTER (OUTPATIENT)
Dept: MAMMOGRAPHY | Age: 44
Discharge: HOME | End: 2019-08-06
Attending: OBSTETRICS & GYNECOLOGY
Payer: COMMERCIAL

## 2019-08-06 DIAGNOSIS — Z12.39 BREAST SCREENING, UNSPECIFIED: ICD-10-CM

## 2019-08-06 PROCEDURE — 77063 BREAST TOMOSYNTHESIS BI: CPT

## 2019-10-08 ENCOUNTER — OFFICE VISIT (OUTPATIENT)
Dept: NEUROLOGY | Age: 44
End: 2019-10-08

## 2019-10-08 VITALS
HEIGHT: 62 IN | OXYGEN SATURATION: 98 % | SYSTOLIC BLOOD PRESSURE: 120 MMHG | HEART RATE: 77 BPM | WEIGHT: 135 LBS | BODY MASS INDEX: 24.84 KG/M2 | DIASTOLIC BLOOD PRESSURE: 70 MMHG

## 2019-10-08 DIAGNOSIS — R93.7 ABNORMAL MRI, CERVICAL SPINE: ICD-10-CM

## 2019-10-08 DIAGNOSIS — G56.02 CARPAL TUNNEL SYNDROME OF LEFT WRIST: ICD-10-CM

## 2019-10-08 DIAGNOSIS — G35 MULTIPLE SCLEROSIS (HCC): Primary | ICD-10-CM

## 2019-10-08 DIAGNOSIS — R53.83 FATIGUE, UNSPECIFIED TYPE: ICD-10-CM

## 2019-10-08 NOTE — PATIENT INSTRUCTIONS

## 2019-10-08 NOTE — PROGRESS NOTES
Neurology follow-up note    Chief Complaint   Patient presents with   Sarah Elias is a 40 y.o. female who presented to the neurology office for management of multiple sclerosis. At the age of 29 she did have left-sided numbness that had been improving and now she does have decreased sensation in her left hand. She was diagnosed with multiple sclerosis in November 2016 as at that time she did have sudden onset right leg weakness and it gets weak and fatigued. In January she did have diplopia. All the symptoms are better now. She initially tried Copaxone for 2 weeks but had hot flashes and was feeling sick and hence was switched over to Rebif December/January 2017 and has been doing better. No recent exacerbations. Patient last MRI was in 2017. Patient is complaining of fatigue at work. Sometimes she goes to sleep while at work. Patient now has a sit and stand desk and that helps the patient. Interval history:  Since the last visit, patient had an EMG/nerve conduction study which did show left carpal tunnel splint. The patient has been using his splint but that is not appropriate. Patient continues to have intermittent left hand numbness    Current Outpatient Medications   Medication Sig    Arm Brace misc Left carpal tunnel splint to be used every night    interferon beta-1a (REBIF, WITH ALBUMIN,) 44 mcg/0.5 mL injection 0.5 mL by SubCUTAneous route every Monday, Wednesday, Friday. Please dispense prefilled syringe per patient's request    Biotin 2,500 mcg cap Take  by mouth.  norethindrone-ethinyl estradiol-iron (JUNEL FE 1.5/30, 28,) 1.5 mg-30 mcg (21)/75 mg (7) tablet Take 1 Tab by mouth daily.  LORATADINE (CLARITIN PO) Take 10 mg by mouth as needed. No current facility-administered medications for this visit.         Past Medical History:   Diagnosis Date    Ill-defined condition     uterine fibroids    Multiple sclerosis ([facility] [address]     Other ill-defined conditions(799.89)     sleep disorder and breast fibroids    Other ill-defined conditions(799.89)     left arm numbness at times    Unspecified breast disorder      Past Surgical History:   Procedure Laterality Date    ABDOMEN SURGERY PROC UNLISTED      myomectomy    HX GYN      fibroids    HX HEENT      tooth extraction    HX OTHER SURGICAL  2015    laparoscopoy      Family History   Problem Relation Age of Onset    Bleeding Prob Mother     Endometriosis Mother     Cancer Maternal Grandmother         lung    Hypertension Maternal Grandfather     Stroke Maternal Grandfather      Social History     Tobacco Use    Smoking status: Never Smoker    Smokeless tobacco: Never Used   Substance Use Topics    Alcohol use: Yes     Comment: occasionally    Drug use: Yes     Types: Marijuana       REVIEW OF SYSTEMS:   A ten system review of constitutional, cardiovascular, respiratory, musculoskeletal, endocrine, skin, SHEENT, genitourinary, psychiatric and neurologic systems was obtained and is unremarkable except left-sided numbness and right leg weakness    EXAMINATION:   Visit Vitals  /70   Pulse 77   Ht 5' 2\" (1.575 m)   Wt 135 lb (61.2 kg)   SpO2 98%   BMI 24.69 kg/m²        General:   General appearance: Pt is in no acute distress   Distal pulses are preserved    Neurological Examination:   Mental Status: AAO x3. Speech is fluent. Follows commands, has normal fund of knowledge, attention, short term recall, comprehension and insight. Cranial Nerves: Visual fields are full. PERRL, Extraocular movements are full. Facial sensation intact V1- V3. Facial movement intact, symmetric. Hearing intact to conversation. Palate elevates symmetrically. Shoulder shrug symmetric. Tongue midline. Motor: Strength is 5/5 in all 4 ext. Sensation: Intact to pinprick sensation    Coordination/Cerebellar: Intact to finger-nose-finger     Skin: No significant bruising or lacerations.     Laboratory review:   Results for orders placed or performed in visit on 08/14/17   CBC WITH AUTOMATED DIFF   Result Value Ref Range    WBC 4.7 3.4 - 10.8 x10E3/uL    RBC 3.98 3.77 - 5.28 x10E6/uL    HGB 12.5 11.1 - 15.9 g/dL    HCT 37.4 34.0 - 46.6 %    MCV 94 79 - 97 fL    MCH 31.4 26.6 - 33.0 pg    MCHC 33.4 31.5 - 35.7 g/dL    RDW 14.2 12.3 - 15.4 %    PLATELET 350 675 - 653 x10E3/uL    NEUTROPHILS 57 %    Lymphocytes 28 %    MONOCYTES 14 %    EOSINOPHILS 1 %    BASOPHILS 0 %    ABS. NEUTROPHILS 2.7 1.4 - 7.0 x10E3/uL    Abs Lymphocytes 1.3 0.7 - 3.1 x10E3/uL    ABS. MONOCYTES 0.6 0.1 - 0.9 x10E3/uL    ABS. EOSINOPHILS 0.1 0.0 - 0.4 x10E3/uL    ABS. BASOPHILS 0.0 0.0 - 0.2 x10E3/uL    IMMATURE GRANULOCYTES 0 %    ABS. IMM. GRANS. 0.0 0.0 - 0.1 P80N5/RF   METABOLIC PANEL, COMPREHENSIVE   Result Value Ref Range    Glucose 89 65 - 99 mg/dL    BUN 13 6 - 24 mg/dL    Creatinine 0.66 0.57 - 1.00 mg/dL    GFR est non- >59 mL/min/1.73    GFR est  >59 mL/min/1.73    BUN/Creatinine ratio 20 9 - 23    Sodium 141 134 - 144 mmol/L    Potassium 4.3 3.5 - 5.2 mmol/L    Chloride 100 96 - 106 mmol/L    CO2 28 18 - 29 mmol/L    Calcium 9.5 8.7 - 10.2 mg/dL    Protein, total 7.0 6.0 - 8.5 g/dL    Albumin 4.4 3.5 - 5.5 g/dL    GLOBULIN, TOTAL 2.6 1.5 - 4.5 g/dL    A-G Ratio 1.7 1.2 - 2.2    Bilirubin, total 0.4 0.0 - 1.2 mg/dL    Alk. phosphatase 57 39 - 117 IU/L    AST (SGOT) 19 0 - 40 IU/L    ALT (SGPT) 19 0 - 32 IU/L       Imaging review:  7/8/2019  MRI brain with and without contrast  Stable numerous white matter lesions with the appearance and pattern consistent with demyelinating disease. No evidence of active demyelinating plaque    MRI cervical spine with and without contrast  Numerous cord lesions again shown without enhancement. Interval inferior right posterior lateral C5, T1 and T2 hypointense lesion.   Given the appearance of a new finding at C5, further evaluation with a bone scan, SPECT is recommended to determine whether these in radionucleotide avid lesions. 6/27/2019  EMG/nerve conduction study  Mild left carpal tunnel syndrome. There is no electrophysiological evidence of a left cervical radiculopathy    9/14/2017  MRI brain with and without contrast  Multifocal white matter T2 hyperintensity in pattern consistent with medical history of multiple sclerosis. No abdominal intracranial enhancement. MRI cervical spine with and without contrast  Greater extent of cord signal alteration in the interval for the equivocal midline anterior cord enhancement at C3 level. Finding consistent with patient's diagnosis of MS. MRI thoracic spine with and without contrast  Stable extent of nonenhancing cord lesion and thoracic spine in keeping with patient's clinical diagnosis of MS.    10/4/2016  MRI thoracic spine with and without contrast  Multifocal thoracic cord signal alteration without evidence for cord enhancement. Lower cervical spine degenerative disc disease. No thoracic spine degenerative disc disease or facet arthrosis. 7/13/2016  MRI cervical spine with and without contrast  Abnormal T2 signal in the normal caliber cervical spinal cord spanning C1 through C7 and is compatible with provided diagnosis of chronic demyelinating disease. No MRI evidence of active demyelination. Documentation review:  None    Assessment/Plan:   1. Multiple sclerosis  The patient is doing well on Rebif that she started around January of 2017. The patient was on Copaxone before but had a reaction. No new exacerbation. Continue Rebif. 2.  Fatigue  The patient is having fatigue at work. Modafinil was not approved by insurance. She does have a sit and stand desk and that helps her. 3.  Left carpal tunnel syndrome  Patient uses the left wrist splint but that one is not appropriate. Will order for another splint.     Follow-up in 3 months     Over 25 minutes was spent with the patient and family of which > 50% of the visit was spent counseling on diagnosis, management, and treatment of the multiple sclerosis and fatigue        ICD-10-CM ICD-9-CM    1. Multiple sclerosis ([address] G35 340    2. Carpal tunnel syndrome of left wrist G56.02 354.0 Arm Brace misc   3. Fatigue, unspecified type R53.83 780.79    4. Abnormal MRI, cervical spine R93.7 793.7 Diana Machado MD  Neurologist    CC: None  Fax: None    This note was created using voice recognition software. Despite editing, there may be syntax errors. This note will not be viewable in [address]

## 2019-11-06 ENCOUNTER — TELEPHONE (OUTPATIENT)
Dept: NEUROLOGY | Age: 44
End: 2019-11-06

## 2019-11-18 ENCOUNTER — HOSPITAL ENCOUNTER (EMERGENCY)
Age: 44
Discharge: HOME | End: 2019-11-18
Attending: EMERGENCY MEDICINE
Payer: COMMERCIAL

## 2019-11-18 VITALS
HEART RATE: 80 BPM | DIASTOLIC BLOOD PRESSURE: 88 MMHG | RESPIRATION RATE: 18 BRPM | BODY MASS INDEX: 25.19 KG/M2 | SYSTOLIC BLOOD PRESSURE: 132 MMHG | HEIGHT: 62 IN | TEMPERATURE: 99.7 F | WEIGHT: 136.91 LBS | OXYGEN SATURATION: 100 %

## 2019-11-18 DIAGNOSIS — M79.10 MYALGIA: Primary | ICD-10-CM

## 2019-11-18 DIAGNOSIS — M79.601 PAIN OF RIGHT UPPER EXTREMITY: ICD-10-CM

## 2019-11-18 PROCEDURE — 96372 THER/PROPH/DIAG INJ SC/IM: CPT

## 2019-11-18 PROCEDURE — 74011250636 HC RX REV CODE- 250/636: Performed by: NURSE PRACTITIONER

## 2019-11-18 PROCEDURE — 99283 EMERGENCY DEPT VISIT LOW MDM: CPT

## 2019-11-18 PROCEDURE — 74011250637 HC RX REV CODE- 250/637: Performed by: NURSE PRACTITIONER

## 2019-11-18 RX ORDER — CYCLOBENZAPRINE HCL 10 MG
10 TABLET ORAL
Qty: 30 TAB | Refills: 0 | Status: SHIPPED | OUTPATIENT
Start: 2019-11-18 | End: 2020-06-03

## 2019-11-18 RX ORDER — NAPROXEN 500 MG/1
500 TABLET ORAL 2 TIMES DAILY WITH MEALS
Qty: 20 TAB | Refills: 0 | Status: SHIPPED | OUTPATIENT
Start: 2019-11-18 | End: 2019-11-18

## 2019-11-18 RX ORDER — DEXAMETHASONE SODIUM PHOSPHATE 4 MG/ML
5 INJECTION, SOLUTION INTRA-ARTICULAR; INTRALESIONAL; INTRAMUSCULAR; INTRAVENOUS; SOFT TISSUE
Status: COMPLETED | OUTPATIENT
Start: 2019-11-18 | End: 2019-11-18

## 2019-11-18 RX ORDER — CYCLOBENZAPRINE HCL 10 MG
10 TABLET ORAL
Status: COMPLETED | OUTPATIENT
Start: 2019-11-18 | End: 2019-11-18

## 2019-11-18 RX ORDER — CYCLOBENZAPRINE HCL 10 MG
10 TABLET ORAL
Qty: 30 TAB | Refills: 0 | Status: SHIPPED | OUTPATIENT
Start: 2019-11-18 | End: 2019-11-18

## 2019-11-18 RX ORDER — NAPROXEN 500 MG/1
500 TABLET ORAL 2 TIMES DAILY WITH MEALS
Qty: 20 TAB | Refills: 0 | Status: SHIPPED | OUTPATIENT
Start: 2019-11-18 | End: 2019-11-28

## 2019-11-18 RX ORDER — KETOROLAC TROMETHAMINE 30 MG/ML
30 INJECTION, SOLUTION INTRAMUSCULAR; INTRAVENOUS
Status: COMPLETED | OUTPATIENT
Start: 2019-11-18 | End: 2019-11-18

## 2019-11-18 RX ADMIN — DEXAMETHASONE SODIUM PHOSPHATE 5 MG: 4 INJECTION, SOLUTION INTRAMUSCULAR; INTRAVENOUS at 08:53

## 2019-11-18 RX ADMIN — KETOROLAC TROMETHAMINE 30 MG: 30 INJECTION, SOLUTION INTRAMUSCULAR at 08:53

## 2019-11-18 RX ADMIN — CYCLOBENZAPRINE 10 MG: 10 TABLET, FILM COATED ORAL at 08:53

## 2019-11-18 NOTE — LETTER
NOTIFICATION RETURN TO WORK / SCHOOL 
 
11/18/2019 9:58 AM 
 
Ms. Luis Johnson [facility] [address] [id number] To Whom It May Concern: 
 
Luis Johnson is currently under the care of hospitals EMERGENCY DEPT. She will return to work/school on: 11/19/2019 If there are questions or concerns please have the patient contact our office.  
 
 
 
Sincerely, 
 
 
Annette GURROLA

## 2019-11-18 NOTE — ED NOTES
I have assumed care of the patient. The patient has been placed in a position of comfort with the call bell within reach. The patient presents to the ED with complaints of right arm pain that started at the shoulder and radiates down the right arm, denies nay injury. States that the pain is worse when it is cold.

## 2019-11-18 NOTE — ED PROVIDER NOTES
EMERGENCY DEPARTMENT HISTORY AND PHYSICAL EXAM      Date: 11/18/2019  Patient Name: Kiran Beach    History of Presenting Illness     Chief Complaint   Patient presents with    Arm Pain     c/o non traumatic right arm pain x one week; concerned for hx of MS. Denies weakness       History Provided By: Patient    HPI: Kiran Beach, 40 y.o. female presents by POV to the ED with cc of complaint of right arm pain. Patient has a significant history of multiple sclerosis and states she does take medication for it however, she thought the pain was coming from her multiple sclerosis however she went to her neurologist and he cleared her from that type of pain. He also instructed her to continue her home medications but to follow-up with the emergency department if symptoms worsen. Patient states that her right arm pain worsens during the cold but has worsened over the past week. Patient denies injury to this arm and states that she takes over-the-counter Tylenol and ibuprofen however, the pain is relieved for approximately 1 to 2 hours but then quickly comes back. Patient states keeping the extremity covered and keeping it warm helps relieve symptoms. There are no other complaints, changes, or physical findings at this time. PCP: None    No current facility-administered medications on file prior to encounter. Current Outpatient Medications on File Prior to Encounter   Medication Sig Dispense Refill    Arm Brace misc Left carpal tunnel splint to be used every night 1 Each 0    interferon beta-1a (REBIF, WITH ALBUMIN,) 44 mcg/0.5 mL injection 0.5 mL by SubCUTAneous route every Monday, Wednesday, Friday. Please dispense prefilled syringe per patient's request 12 Syringe 5    Biotin 2,500 mcg cap Take  by mouth.  norethindrone-ethinyl estradiol-iron (JUNEL FE 1.5/30, 28,) 1.5 mg-30 mcg (21)/75 mg (7) tablet Take 1 Tab by mouth daily.  LORATADINE (CLARITIN PO) Take 10 mg by mouth as needed. Past History     Past Medical History:  Past Medical History:   Diagnosis Date    Ill-defined condition     uterine fibroids    Multiple sclerosis (HCC)     Other ill-defined conditions(799.89)     sleep disorder and breast fibroids    Other ill-defined conditions(799.89)     left arm numbness at times    Unspecified breast disorder        Past Surgical History:  Past Surgical History:   Procedure Laterality Date    ABDOMEN SURGERY PROC UNLISTED      myomectomy    HX GYN      fibroids    HX HEENT      tooth extraction    HX OTHER SURGICAL  2015    laparoscopoy        Family History:  Family History   Problem Relation Age of Onset    Bleeding Prob Mother     Endometriosis Mother     Cancer Maternal Grandmother         lung    Hypertension Maternal Grandfather     Stroke Maternal Grandfather        Social History:  Social History     Tobacco Use    Smoking status: Never Smoker    Smokeless tobacco: Never Used   Substance Use Topics    Alcohol use: Yes     Comment: occasionally    Drug use: Yes     Types: Marijuana       Allergies:  No Known Allergies      Review of Systems   Review of Systems   Constitutional: Negative for chills and fever. HENT: Negative for congestion, rhinorrhea and sore throat. Respiratory: Negative for cough and shortness of breath. Cardiovascular: Negative for chest pain. Gastrointestinal: Negative for abdominal pain, nausea and vomiting. Endocrine: Negative for polyuria. Genitourinary: Negative for dysuria and frequency. Musculoskeletal: Positive for arthralgias and myalgias. Skin: Negative for rash. Neurological: Negative for dizziness, weakness and headaches. All other systems reviewed and are negative. Physical Exam   Physical Exam   Constitutional: She is oriented to person, place, and time. She appears well-developed and well-nourished. No distress. 40 y.o. female   HENT:   Head: Normocephalic and atraumatic.    Right Ear: External ear normal.   Left Ear: External ear normal.   Nose: Nose normal.   Mouth/Throat: Oropharynx is clear and moist.   Eyes: Pupils are equal, round, and reactive to light. Conjunctivae and EOM are normal. Right eye exhibits no discharge. Left eye exhibits no discharge. Neck: Normal range of motion. Neck supple. No tracheal deviation present. No thyromegaly present. Cardiovascular: Normal rate, regular rhythm and normal heart sounds. No murmur heard. Pulmonary/Chest: Effort normal and breath sounds normal. No respiratory distress. She has no wheezes. Abdominal: Soft. Bowel sounds are normal. She exhibits no distension. There is no tenderness. There is no rebound. Musculoskeletal: Normal range of motion. She exhibits no edema, tenderness or deformity. Neurological: She is alert and oriented to person, place, and time. She has normal reflexes. She displays normal reflexes. No cranial nerve deficit. She exhibits normal muscle tone. Coordination normal.   Skin: Skin is warm and dry. She is not diaphoretic. Psychiatric: She has a normal mood and affect. Her behavior is normal.   Nursing note and vitals reviewed. Diagnostic Study Results     Labs -   No results found for this or any previous visit (from the past 12 hour(s)). Radiologic Studies -   No orders to display     CT Results  (Last 48 hours)    None        CXR Results  (Last 48 hours)    None            Medical Decision Making   I am the first provider for this patient. I reviewed the vital signs, available nursing notes, past medical history, past surgical history, family history and social history. Vital Signs-Reviewed the patient's vital signs.   Patient Vitals for the past 12 hrs:   Temp Pulse Resp BP SpO2   11/18/19 0812 99.7 °F (37.6 °C) 80 18 132/88 100 %       Records Reviewed: Nursing Notes, Old Medical Records, Previous Radiology Studies and Previous Laboratory Studies    Provider Notes (Medical Decision Making):   Differential diagnoses include multiple sclerosis exacerbation, peripheral nerve disease, degenerative joint disease. ED Course:   Initial assessment performed. The patients presenting problems have been discussed, and they are in agreement with the care plan formulated and outlined with them. I have encouraged them to ask questions as they arise throughout their visit. Patient states that her pain and achiness has decreased to a 2 out of 10 on the pain scale and states she would like muscle relaxant and anti-inflammatory medication or prescription to take home to help relieve symptoms. Patient was given a prescription for naproxen 500 mg twice a day and for Flexeril muscle relaxant for 3 times a day. Advised while taking Flexeril, do not drink, drive or operate heavy machinery, may cause drowsiness. Patient states she has been driven home by her dad today and will not operate heavy machinery while taking the muscle relaxant. Otherwise patient should follow-up with her primary care doctor in 3 to 5 days or come back to the emergency department if new symptoms occur. Critical Care Time: None    Disposition:  DISCHARGE NOTE:  9:55 AM  The pt is ready for discharge. The pt's signs, symptoms, diagnosis, and discharge instructions have been discussed and pt has conveyed their understanding. The pt is to follow up as recommended or return to ER should their symptoms worsen. Plan has been discussed and pt is in agreement. Kevin Hunter NP  11/18/2019      PLAN:  1. Current Discharge Medication List      START taking these medications    Details   naproxen (NAPROSYN) 500 mg tablet Take 1 Tab by mouth two (2) times daily (with meals) for 10 days. Qty: 20 Tab, Refills: 0      cyclobenzaprine (FLEXERIL) 10 mg tablet Take 1 Tab by mouth three (3) times daily as needed for Muscle Spasm(s).   Qty: 30 Tab, Refills: 0         CONTINUE these medications which have NOT CHANGED    Details   Arm Brace misc Left carpal tunnel splint to be used every night  Qty: 1 Each, Refills: 0    Associated Diagnoses: Carpal tunnel syndrome of left wrist      interferon beta-1a (REBIF, WITH ALBUMIN,) 44 mcg/0.5 mL injection 0.5 mL by SubCUTAneous route every Monday, Wednesday, Friday. Please dispense prefilled syringe per patient's request  Qty: 12 Syringe, Refills: 5    Associated Diagnoses: Multiple sclerosis (HCC)      Biotin 2,500 mcg cap Take  by mouth. norethindrone-ethinyl estradiol-iron (JUNEL FE 1.5/30, 28,) 1.5 mg-30 mcg (21)/75 mg (7) tablet Take 1 Tab by mouth daily. LORATADINE (CLARITIN PO) Take 10 mg by mouth as needed. 2.   Follow-up Information     Follow up With Specialties Details Why Contact Info    [facility] EMERGENCY DEPT Emergency Medicine Go in 1 week As needed, If symptoms worsen [address] [place] [address]  [phone]        Return to ED if worse     Diagnosis     Clinical Impression:   1. Myalgia    2. Pain of right upper extremity          Please note that this dictation was completed with [employer], the computer voice recognition software. Quite often unanticipated grammatical, syntax, homophones, and other interpretive errors are inadvertently transcribed by the computer software. Please disregards these errors. Please excuse any errors that have escaped final proofreading. This note will not be viewable in [address]

## 2019-12-23 ENCOUNTER — TELEPHONE (OUTPATIENT)
Dept: NEUROLOGY | Age: 44
End: 2019-12-23

## 2019-12-23 NOTE — TELEPHONE ENCOUNTER
Patient called to k on the status of there PA from [facility].  Pt states that she only has 1 syringe left

## 2019-12-23 NOTE — TELEPHONE ENCOUNTER
----- Message from Venu De Leon 2906 sent at 12/23/2019 10:40 AM EST -----  Regarding: dr Jp raza  General Message/Vendor Calls    Caller's first and last name: [place] from Rx Prior Authorization - [facility]      Reason for call: to check on the status of the prior authorization for the pt's rebif that was sent on 12/16      Callback required yes/no and why: yes      Best contact number(s): [phone]      Details to clarify the request:      Venu De Leon 2906

## 2019-12-25 ENCOUNTER — TELEPHONE (OUTPATIENT)
Dept: NEUROLOGY | Age: 44
End: 2019-12-25

## 2019-12-25 DIAGNOSIS — G35 MULTIPLE SCLEROSIS (HCC): ICD-10-CM

## 2019-12-25 NOTE — TELEPHONE ENCOUNTER
Patient called as she is running out of Rebif. Inquiring about authorization of her medication. On review, I do not see any authorization needed. I just went ahead and sent a new prescription for Rebif to her pharmacy.

## 2019-12-26 ENCOUNTER — TELEPHONE (OUTPATIENT)
Dept: NEUROLOGY | Age: 44
End: 2019-12-26

## 2019-12-26 NOTE — TELEPHONE ENCOUNTER
Spoke with Madyson Hunter at [place] [address] approved Case # [id number] start date 12/26/2019 expires 12/26/2020 [employer] will fax office approval information    Called patient to let her know Dr Yusra Farias sent Rebif Rx to [facility] patient will contact with approval auth and have Rebif shipped overnight for continuation of treatment    [facility]

## 2019-12-27 NOTE — TELEPHONE ENCOUNTER
Sent PA for Rebif to [address]  via [facility]. Sent w/ Oct 2019 office notes. Once approved, will fax to [employer] at [phone].

## 2020-01-14 ENCOUNTER — TELEPHONE (OUTPATIENT)
Dept: NEUROLOGY | Age: 45
End: 2020-01-14

## 2020-02-03 ENCOUNTER — TELEPHONE (OUTPATIENT)
Dept: NEUROLOGY | Age: 45
End: 2020-02-03

## 2020-02-03 NOTE — TELEPHONE ENCOUNTER
Received a P. A. request in my folder today for NM Bone Scan whole body  CPT 97057. Noted on form it is scheduled 2/5/20 at U 8 am.     Called [facility] - they do not authorize this test - it is done by [facility] directly. Called [facility] in [address] [place] [address] Dept at 10:41 am. No Prior Auth required. Called Benefits/ [facility], s/w Neal Call ref today [phone] it is a valid and billable code and no PA required. Called [facility], left details of this on [phone] and faxed details along w/ previous MRI Cervical and referral order to [phone]. 7 pages confirmed.

## 2020-02-04 ENCOUNTER — TELEPHONE (OUTPATIENT)
Dept: NEUROLOGY | Age: 45
End: 2020-02-04

## 2020-02-04 NOTE — TELEPHONE ENCOUNTER
Called pt to advise of her Nuc Med/Whole Body scan - is approved. Confirmed her appt w/ [facility] tomorrow at 8 am. Provided NM ph [phone] to her if she needed to contact them. She relayed financial concerns for tests and she chose [facility] due to a discount offered through a website she called \"Smart \" . It did not tell her the amount of the discount or the net cost however. She also mentioned she wants to discuss other options that are not injectables for her MS (currently on Rebif). She would like to research options before her March 30th appt. FYI to Dr. Barros Sender.

## 2020-03-05 DIAGNOSIS — R93.7 ABNORMAL MRI, CERVICAL SPINE: ICD-10-CM

## 2020-03-28 NOTE — PROGRESS NOTES
Neurology follow-up note    Chief Complaint   Patient presents with    Follow-up     no symptoms, not using splint painful after 3 months of use    Multiple Sclerosis       SUBJECTIVE  Iban Kent is a 40 y.o. female who presented to the neurology office for management of multiple sclerosis. At the age of 29 she did have left-sided numbness that had been improving and now she does have decreased sensation in her left hand. She was diagnosed with multiple sclerosis in November 2016 as at that time she did have sudden onset right leg weakness and it gets weak and fatigued. In January she did have diplopia. All the symptoms are better now. She initially tried Copaxone for 2 weeks but had hot flashes and was feeling sick and hence was switched over to Rebif December/January 2017 and has been doing better. No recent exacerbations. Patient last MRI was in 2017. Patient is complaining of fatigue at work. Sometimes she goes to sleep while at work. Patient now has a sit and stand desk and that helps the patient. Patient did have EMG/nerve conduction study which showed left carpal tunnel syndrome    Interval history:  Since the last visit, patient was given a prescription for left carpal tunnel splint. She had been using it initially and her hand numbness is improved. The splint started hurting her wrist and hence she stopped using it  Patient is having little fatigue on Rebif and wants to switch to oral medication    Current Outpatient Medications   Medication Sig    interferon beta-1a (REBIF, WITH ALBUMIN,) 44 mcg/0.5 mL injection 0.5 mL by SubCUTAneous route every Monday, Wednesday, Friday. Please dispense prefilled syringe per patient's request    Biotin 2,500 mcg cap Take  by mouth.  norethindrone-ethinyl estradiol-iron (JUNEL FE 1.5/30, 28,) 1.5 mg-30 mcg (21)/75 mg (7) tablet Take 1 Tab by mouth daily.  LORATADINE (CLARITIN PO) Take 10 mg by mouth as needed.     cyclobenzaprine (FLEXERIL) 10 mg tablet Take 1 Tab by mouth three (3) times daily as needed for Muscle Spasm(s).  Arm Brace misc Left carpal tunnel splint to be used every night     No current facility-administered medications for this visit. Past Medical History:   Diagnosis Date    Ill-defined condition     uterine fibroids    Multiple sclerosis (HCC)     Other ill-defined conditions(799.89)     sleep disorder and breast fibroids    Other ill-defined conditions(799.89)     left arm numbness at times    Unspecified breast disorder      Past Surgical History:   Procedure Laterality Date    ABDOMEN SURGERY PROC UNLISTED      myomectomy    HX GYN      fibroids    HX HEENT      tooth extraction    HX OTHER SURGICAL  2015    laparoscopoy      Family History   Problem Relation Age of Onset    Bleeding Prob Mother     Endometriosis Mother     Cancer Maternal Grandmother         lung    Hypertension Maternal Grandfather     Stroke Maternal Grandfather      Social History     Tobacco Use    Smoking status: Never Smoker    Smokeless tobacco: Never Used   Substance Use Topics    Alcohol use: Yes     Comment: occasionally    Drug use: Yes     Types: Marijuana       REVIEW OF SYSTEMS:   A ten system review of constitutional, cardiovascular, respiratory, musculoskeletal, endocrine, skin, SHEENT, genitourinary, psychiatric and neurologic systems was obtained and is unremarkable except left-sided numbness and right leg weakness    EXAMINATION:   There were no vitals taken for this visit. General:  Exam limited because of virtual visit. General appearance: Pt is in no acute distress     Neurological Examination:   Mental Status: AAO x3. Speech is fluent. Follows commands, has normal fund of knowledge, attention, short term recall, comprehension and insight. Cranial Nerves:  Extraocular movements are full. Facial movement intact. Hearing intact to conversation. Shoulder shrug symmetric. Tongue midline. Motor: Strength is symmetric in all 4 ext. Sensation: Normal according to patient to light touch    Coordination/Cerebellar: Intact to finger-nose-finger     Gait: Casual gait is normal.     Laboratory review:   Results for orders placed or performed in visit on 08/14/17   CBC WITH AUTOMATED DIFF   Result Value Ref Range    WBC 4.7 3.4 - 10.8 x10E3/uL    RBC 3.98 3.77 - 5.28 x10E6/uL    HGB 12.5 11.1 - 15.9 g/dL    HCT 37.4 34.0 - 46.6 %    MCV 94 79 - 97 fL    MCH 31.4 26.6 - 33.0 pg    MCHC 33.4 31.5 - 35.7 g/dL    RDW 14.2 12.3 - 15.4 %    PLATELET 384 005 - 197 x10E3/uL    NEUTROPHILS 57 %    Lymphocytes 28 %    MONOCYTES 14 %    EOSINOPHILS 1 %    BASOPHILS 0 %    ABS. NEUTROPHILS 2.7 1.4 - 7.0 x10E3/uL    Abs Lymphocytes 1.3 0.7 - 3.1 x10E3/uL    ABS. MONOCYTES 0.6 0.1 - 0.9 x10E3/uL    ABS. EOSINOPHILS 0.1 0.0 - 0.4 x10E3/uL    ABS. BASOPHILS 0.0 0.0 - 0.2 x10E3/uL    IMMATURE GRANULOCYTES 0 %    ABS. IMM. GRANS. 0.0 0.0 - 0.1 M36Y5/NK   METABOLIC PANEL, COMPREHENSIVE   Result Value Ref Range    Glucose 89 65 - 99 mg/dL    BUN 13 6 - 24 mg/dL    Creatinine 0.66 0.57 - 1.00 mg/dL    GFR est non- >59 mL/min/1.73    GFR est  >59 mL/min/1.73    BUN/Creatinine ratio 20 9 - 23    Sodium 141 134 - 144 mmol/L    Potassium 4.3 3.5 - 5.2 mmol/L    Chloride 100 96 - 106 mmol/L    CO2 28 18 - 29 mmol/L    Calcium 9.5 8.7 - 10.2 mg/dL    Protein, total 7.0 6.0 - 8.5 g/dL    Albumin 4.4 3.5 - 5.5 g/dL    GLOBULIN, TOTAL 2.6 1.5 - 4.5 g/dL    A-G Ratio 1.7 1.2 - 2.2    Bilirubin, total 0.4 0.0 - 1.2 mg/dL    Alk. phosphatase 57 39 - 117 IU/L    AST (SGOT) 19 0 - 40 IU/L    ALT (SGPT) 19 0 - 32 IU/L       Imaging review:  7/8/2019  MRI brain with and without contrast  Stable numerous white matter lesions with the appearance and pattern consistent with demyelinating disease.   No evidence of active demyelinating plaque    MRI cervical spine with and without contrast  Numerous cord lesions again shown without enhancement. Interval inferior right posterior lateral C5, T1 and T2 hypointense lesion. Given the appearance of a new finding at C5, further evaluation with a bone scan, SPECT is recommended to determine whether these in radionucleotide avid lesions. 6/27/2019  EMG/nerve conduction study  Mild left carpal tunnel syndrome. There is no electrophysiological evidence of a left cervical radiculopathy    9/14/2017  MRI brain with and without contrast  Multifocal white matter T2 hyperintensity in pattern consistent with medical history of multiple sclerosis. No abdominal intracranial enhancement. MRI cervical spine with and without contrast  Greater extent of cord signal alteration in the interval for the equivocal midline anterior cord enhancement at C3 level. Finding consistent with patient's diagnosis of MS. MRI thoracic spine with and without contrast  Stable extent of nonenhancing cord lesion and thoracic spine in keeping with patient's clinical diagnosis of MS.    10/4/2016  MRI thoracic spine with and without contrast  Multifocal thoracic cord signal alteration without evidence for cord enhancement. Lower cervical spine degenerative disc disease. No thoracic spine degenerative disc disease or facet arthrosis. 7/13/2016  MRI cervical spine with and without contrast  Abnormal T2 signal in the normal caliber cervical spinal cord spanning C1 through C7 and is compatible with provided diagnosis of chronic demyelinating disease. No MRI evidence of active demyelination. Documentation review:  None    Assessment/Plan:   1. Multiple sclerosis  The patient is doing well on Rebif that she started around January of 2017. The patient was on Copaxone before but had a reaction. No new exacerbation. Continue Rebif for now. She wants to switch to oral medication. I discussed with her about Tecfidera.   We will see her back in 3 months in office and will get blood work and fill out form for Tecfider[employer]. 2.  Fatigue  The patient is having fatigue at work. Modafinil was not approved by insurance. She does have a sit and stand desk and that helps her. 3.  Left carpal tunnel syndrome  Symptoms are better with left carpal tunnel splint. She is not using it anymore but is hurting her wrist.    Follow-up in 3 months     Over 25 minutes was spent with the patient and family of which > 50% of the visit was spent counseling on diagnosis, management, and treatment of the multiple sclerosis and fatigue        ICD-10-CM ICD-9-CM    1. Multiple sclerosis ([facility] [address] G35 340    2. Carpal tunnel syndrome of left wrist G56.02 354.0    3. Fatigue, unspecified type R53.83 780.79         Claude Feliz MD  Neurologist    CC: None  Fax: None    This note was created using voice recognition software. Despite editing, there may be syntax errors. This note will not be viewable in [address] José Miguel Ontiveros was seen by synchronous (real-time) audio-video technology on 03/30/20. Consent:  She and/or her healthcare decision maker is aware that this patient-initiated Telehealth encounter is a billable service, with coverage as determined by her insurance carrier. She is aware that she may receive a bill and has provided verbal consent to proceed: Yes    I was in the office while conducting this encounter. Pursuant to the emergency declaration under the [facility], [facility] waiver authority and the [employer] Act, this Virtual  Visit was conducted, with patient's consent, to reduce the patient's risk of exposure to COVID-19 and provide continuity of care for an established patient. Services were provided through a video synchronous discussion virtually to substitute for in-person clinic visit.

## 2020-03-30 ENCOUNTER — VIRTUAL VISIT (OUTPATIENT)
Dept: NEUROLOGY | Age: 45
End: 2020-03-30

## 2020-03-30 DIAGNOSIS — G35 MULTIPLE SCLEROSIS (HCC): Primary | ICD-10-CM

## 2020-03-30 DIAGNOSIS — R53.83 FATIGUE, UNSPECIFIED TYPE: ICD-10-CM

## 2020-03-30 DIAGNOSIS — G56.02 CARPAL TUNNEL SYNDROME OF LEFT WRIST: ICD-10-CM

## 2020-03-30 NOTE — PATIENT INSTRUCTIONS

## 2020-05-26 DIAGNOSIS — G35 MULTIPLE SCLEROSIS (HCC): ICD-10-CM

## 2020-05-26 RX ORDER — INTERFERON BETA-1A 44 UG/.5ML
44 INJECTION, SOLUTION SUBCUTANEOUS
Qty: 12 SYRINGE | Refills: 5 | Status: SHIPPED | OUTPATIENT
Start: 2020-05-27 | End: 2020-08-04

## 2020-05-26 RX ORDER — INTERFERON BETA-1A 44 UG/.5ML
INJECTION, SOLUTION SUBCUTANEOUS
Refills: 4 | OUTPATIENT
Start: 2020-05-26

## 2020-06-03 ENCOUNTER — VIRTUAL VISIT (OUTPATIENT)
Dept: INTERNAL MEDICINE CLINIC | Age: 45
End: 2020-06-03

## 2020-06-03 DIAGNOSIS — E04.9 GOITER: ICD-10-CM

## 2020-06-03 DIAGNOSIS — E55.9 VITAMIN D DEFICIENCY: ICD-10-CM

## 2020-06-03 DIAGNOSIS — R13.10 DYSPHAGIA, UNSPECIFIED TYPE: Primary | ICD-10-CM

## 2020-06-03 DIAGNOSIS — Z86.018 HISTORY OF UTERINE LEIOMYOMA: ICD-10-CM

## 2020-06-03 DIAGNOSIS — N60.19 FIBROCYSTIC BREAST DISEASE (FCBD), UNSPECIFIED LATERALITY: ICD-10-CM

## 2020-06-03 NOTE — PROGRESS NOTES
Chelsy Dimas is a 40 y.o. female who was seen by synchronous (real-time) audio-video technology on 6/3/2020. Consent: Chelsy Dimas, who was seen by synchronous (real-time) audio-video technology, and/or her healthcare decision maker, is aware that this patient-initiated, Telehealth encounter on 6/3/2020 is a billable service, with coverage as determined by her insurance carrier. She is aware that she may receive a bill and has provided verbal consent to proceed: Yes. Assessment & Plan:   Diagnoses and all orders for this visit:    1. Goiter-with increased goiter size and minor dysphagia. Recommended office evaluation. 2. Vitamin D deficiency-recommended drawing blood level prior to prescribing a supplement    3. Fibrocystic breast disease (FCBD), unspecified laterality-continued surveillance warranted with annual mammograms. 4. History of uterine leiomyoma-under the care of OB/GYN      Subjective:   Chelsy Dimas is a 40 y.o. female who was seen for Medication Refill    77-year-old new patient requesting a vitamin D a refill. Patient was diagnosed with vitamin D deficiency within the last year or 2. She stopped taking the medication about a year ago for no apparent reason. Patient reports increased goiter size over past 9 months which seem to coincide with her discontinuing vitamin D which is why she wants to restart the vitamin D. She had a negative thyroid biopsy. .  She does experience some dysphasia but no neck pain. History of fibrocystic cystic breast disease. No history of breast biopsy. Patient is due for annual mammogram.    Fibroid uterus history with myomectomy. Prior to Admission medications    Medication Sig Start Date End Date Taking? Authorizing Provider   interferon beta-1a (Rebif, with albumin,) 44 mcg/0.5 mL injection 0.5 mL by SubCUTAneous route every Monday, Wednesday, Friday.  Please dispense prefilled syringe per patient's request 5/27/20  Yes Margot Reyes MD   Biotin 2,500 mcg cap Take  by mouth. Yes Provider, Historical   norethindrone-ethinyl estradiol-iron (JUNEL FE 1.5/30, 28,) 1.5 mg-30 mcg (21)/75 mg (7) tablet Take 1 Tab by mouth daily. Yes Provider, Historical   LORATADINE (CLARITIN PO) Take 10 mg by mouth as needed. 9/9/10  Yes Provider, Historical   cyclobenzaprine (FLEXERIL) 10 mg tablet Take 1 Tab by mouth three (3) times daily as needed for Muscle Spasm(s). 11/18/19 6/3/20  Twylla Splinter, NP   Arm Brace misc Left carpal tunnel splint to be used every night 10/8/19   Gucci Boyce MD     No Known Allergies    Patient Active Problem List   Diagnosis Code    Mass of multiple sites of left breast N63.20    Mass of multiple sites of right breast N63.10    Fibroid uterus D25.9     Patient Active Problem List    Diagnosis Date Noted    Fibroid uterus 11/02/2015    Mass of multiple sites of left breast 04/08/2014    Mass of multiple sites of right breast 04/08/2014     Current Outpatient Medications   Medication Sig Dispense Refill    interferon beta-1a (Rebif, with albumin,) 44 mcg/0.5 mL injection 0.5 mL by SubCUTAneous route every Monday, Wednesday, Friday. Please dispense prefilled syringe per patient's request 12 Syringe 5    Biotin 2,500 mcg cap Take  by mouth.  norethindrone-ethinyl estradiol-iron (JUNEL FE 1.5/30, 28,) 1.5 mg-30 mcg (21)/75 mg (7) tablet Take 1 Tab by mouth daily.  LORATADINE (CLARITIN PO) Take 10 mg by mouth as needed.       Arm Brace misc Left carpal tunnel splint to be used every night 1 Each 0     No Known Allergies  Past Medical History:   Diagnosis Date    Ill-defined condition     uterine fibroids    Multiple sclerosis (HCC)     Other ill-defined conditions(799.89)     sleep disorder and breast fibroids    Other ill-defined conditions(799.89)     left arm numbness at times    Unspecified breast disorder      Past Surgical History:   Procedure Laterality Date    ABDOMEN SURGERY PROC UNLISTED myomectomy    HX GYN      fibroids    HX HEENT      tooth extraction    HX OTHER SURGICAL  2015    laparoscopoy      Family History   Problem Relation Age of Onset    Bleeding Prob Mother     Endometriosis Mother     Cancer Maternal Grandmother         lung    Hypertension Maternal Grandfather     Stroke Maternal Grandfather        Review of Systems   Constitutional: Negative. Respiratory: Negative. Cardiovascular: Negative. Musculoskeletal: Negative. Neurological: Negative. Psychiatric/Behavioral: Negative. Objective:   Vital Signs: (As obtained by patient/caregiver at home)  There were no vitals taken for this visit.      [INSTRUCTIONS:  \"[x]\" Indicates a positive item  \"[]\" Indicates a negative item  -- DELETE ALL ITEMS NOT EXAMINED]    Constitutional: [x] Appears well-developed and well-nourished [x] No apparent distress      [] Abnormal -     Mental status: [x] Alert and awake  [x] Oriented to person/place/time [x] Able to follow commands    [] Abnormal -     Eyes:   EOM    [x]  Normal    [] Abnormal -   Sclera  [x]  Normal    [] Abnormal -          Discharge [x]  None visible   [] Abnormal -     HENT: [x] Normocephalic, atraumatic  [] Abnormal -   [x] Mouth/Throat: Mucous membranes are moist    External Ears [x] Normal  [] Abnormal -    Neck: [x] No visualized mass [] Abnormal -     Pulmonary/Chest: [x] Respiratory effort normal   [x] No visualized signs of difficulty breathing or respiratory distress        [] Abnormal -      Musculoskeletal:   [] Normal gait with no signs of ataxia         [x] Normal range of motion of neck        [] Abnormal -     Neurological:        [x] No Facial Asymmetry (Cranial nerve 7 motor function) (limited exam due to video visit)          [x] No gaze palsy        [] Abnormal -          Skin:        [x] No significant exanthematous lesions or discoloration noted on facial skin         [] Abnormal -            Psychiatric:       [x] Normal Affect [] Abnormal -        [x] No Hallucinations    Other pertinent observable physical exam findings:-        We discussed the expected course, resolution and complications of the diagnosis(es) in detail. Medication risks, benefits, costs, interactions, and alternatives were discussed as indicated. I advised her to contact the office if her condition worsens, changes or fails to improve as anticipated. She expressed understanding with the diagnosis(es) and plan. Annie Goff is a 40 y.o. female who was evaluated by a video visit encounter for concerns as above. Patient identification was verified prior to start of the visit. A caregiver was present when appropriate. Due to this being a TeleHealth encounter (During SGDAW-58 public health emergency), evaluation of the following organ systems was limited: Vitals/Constitutional/EENT/Resp/CV/GI//MS/Neuro/Skin/Heme-Lymph-Imm. Pursuant to the emergency declaration under the [facility] waiver authority and the [employer] Act, this Virtual  Visit was conducted, with patient's (and/or legal guardian's) consent, to reduce the patient's risk of exposure to COVID-19 and provide necessary medical care. Services were provided through a video synchronous discussion virtually to substitute for in-person clinic visit. Patient and provider were located at their individual homes.       Andre Tucker MD

## 2020-06-03 NOTE — PROGRESS NOTES
Chief Complaint   Patient presents with    Medication Refill     1. Have you been to the ER, urgent care clinic since your last visit? Hospitalized since your last visit? No    2. Have you seen or consulted any other health care providers outside of the [address] [place] [address] since your last visit? Include any pap smears or colon screening.  No

## 2020-07-08 ENCOUNTER — OFFICE VISIT (OUTPATIENT)
Dept: INTERNAL MEDICINE CLINIC | Age: 45
End: 2020-07-08

## 2020-07-08 VITALS
BODY MASS INDEX: 26.37 KG/M2 | TEMPERATURE: 98.6 F | DIASTOLIC BLOOD PRESSURE: 85 MMHG | OXYGEN SATURATION: 98 % | HEIGHT: 62 IN | WEIGHT: 143.3 LBS | RESPIRATION RATE: 16 BRPM | SYSTOLIC BLOOD PRESSURE: 126 MMHG | HEART RATE: 80 BPM

## 2020-07-08 DIAGNOSIS — Z79.899 ENCOUNTER FOR LONG-TERM (CURRENT) USE OF OTHER MEDICATIONS: ICD-10-CM

## 2020-07-08 DIAGNOSIS — Z86.39 HISTORY OF THYROID NODULE: ICD-10-CM

## 2020-07-08 DIAGNOSIS — G35 MULTIPLE SCLEROSIS (HCC): ICD-10-CM

## 2020-07-08 DIAGNOSIS — E01.0 THYROMEGALY: Primary | ICD-10-CM

## 2020-07-08 DIAGNOSIS — N63.20 MASS OF MULTIPLE SITES OF LEFT BREAST: ICD-10-CM

## 2020-07-08 DIAGNOSIS — E55.9 VITAMIN D DEFICIENCY: ICD-10-CM

## 2020-07-08 NOTE — PROGRESS NOTES
Chief Complaint   Patient presents with    Thyroid Problem     routine follow up      1. Have you been to the ER, urgent care clinic since your last visit? Hospitalized since your last visit? No    2. Have you seen or consulted any other health care providers outside of the [address] [place] [address] since your last visit? Include any pap smears or colon screening.  No

## 2020-07-08 NOTE — PROGRESS NOTES
[facility]INE [facility]  Barb Saleh. MD Chante  [address] [place] [id number]      Chief Complaint   Patient presents with    Thyroid Problem     routine follow up        SUBJECTIVE:    Ailcja Olivia is a 40 y.o. new female patient for thyroid evaluation. Patient is concerned about a thyroid mass noticed over the past few months. There's been no  growth since it was noticed. There is no pain or dysphagia. Patient is euthyroid by history. Patient was diagnosed with it with a benign thyroid nodule approximately 5 years ago. She had a ultrasound and a biopsy at that time. She also has a history of multiple sclerosis, followed by [facility]. Rani Nicholas at [facility], and vitamin D deficiency treated with a supplement. MS relapses are largely triggered by heat. Patient has used an ice vest for treatment. Current Outpatient Medications   Medication Sig Dispense Refill    Arm Brace misc Left carpal tunnel splint to be used every night 1 Each 0    Biotin 2,500 mcg cap Take  by mouth.  norethindrone-ethinyl estradiol-iron (JUNEL FE 1.5/30, 28,) 1.5 mg-30 mcg (21)/75 mg (7) tablet Take 1 Tab by mouth daily.  LORATADINE (CLARITIN PO) Take 10 mg by mouth as needed.  interferon beta-1a (Rebif, with albumin,) 44 mcg/0.5 mL injection 0.5 mL by SubCUTAneous route every Monday, Wednesday, Friday.  Please dispense prefilled syringe per patient's request 12 Syringe 5     Past Medical History:   Diagnosis Date    Ill-defined condition     uterine fibroids    Multiple sclerosis (HCC)     Other ill-defined conditions(799.89)     sleep disorder and breast fibroids    Other ill-defined conditions(799.89)     left arm numbness at times    Unspecified breast disorder      Past Surgical History:   Procedure Laterality Date    ABDOMEN SURGERY PROC UNLISTED      myomectomy    HX GYN      fibroids    HX HEENT      tooth extraction    HX OTHER SURGICAL  2015    laparoscopoy      No Known Allergies    REVIEW OF SYSTEMS:  Neurological: Positive for tingling and weakness. Psychiatric/Behavioral: The patient has insomnia (ABNL SLEEP STUDY, DECREASED rem, USED TRAZODONE IN THE PAST). All other systems reviewed and are negative. Social History     Socioeconomic History    Marital status: SINGLE     Spouse name: Not on file    Number of children: Not on file    Years of education: Not on file    Highest education level: Not on file   Tobacco Use    Smoking status: Never Smoker    Smokeless tobacco: Never Used   Substance and Sexual Activity    Alcohol use: Yes     Comment: occasionally    Drug use: Yes     Types: Marijuana    Sexual activity: Not Currently     Partners: Male     Birth control/protection: Pill, Condom     Family History   Problem Relation Age of Onset    Bleeding Prob Mother     Endometriosis Mother     Cancer Maternal Grandmother         lung    Hypertension Maternal Grandfather     Stroke Maternal Grandfather        OBJECTIVE:     Visit Vitals  /85   Pulse 80   Temp 98.6 °F (37 °C) (Oral)   Resp 16   Ht 5' 2\" (1.575 m)   Wt 143 lb 4.8 oz (65 kg)   LMP 07/04/2020 (Approximate)   SpO2 98%   BMI 26.21 kg/m²     CONSTITUTIONAL: well developed, well nourished, appears age appropriate  EYES: perrla, eom intact  ENMT:moist mucous membranes, pharynx clear  NECK: supple. Enlarged right thyroid lobe without tenderness or discrete nodule  RESPIRATORY: Chest: clear bilaterally  CARDIOVASCULAR: Heart: regular rate and rhythm  GASTROINTESTINAL: Abdomen: soft, bowel sounds active  HEMATOLOGIC: no pathological lymph nodes palpated  MUSCULOSKELETAL: Extremities: no edema, pulse 2+   INTEGUMENT: Normal texture and turgor. No unusual rashes or suspicious skin lesions noted. Nails appear normal.  NEUROLOGIC: non-focal exam   MENTAL STATUS: alert and oriented, appropriate affect     ASSESSMENT:   1. Thyromegaly    2. History of thyroid nodule    3.  Encounter for long-term (current) use of other medications    4. Multiple sclerosisstable  5. Vitamin D deficiencystable    I have discussed the diagnosis with the patient and the intended plan as seen in the  orders. The patient understands and agees with the plan. The patient has   received an after visit summary and questions were answered concerning  future plans  Patient labs and/or xrays were reviewed  Past records were reviewed. PLAN:  .  Orders Placed This Encounter    US THYROID/PARATHYROID/SOFT TISS    LIPID PANEL    TSH 3RD GENERATION    T4, FREE    CBC WITH AUTOMATED DIFF    METABOLIC PANEL, COMPREHENSIVE    LIPID PANEL    TSH 3RD GENERATION    T4, FREE     Counseled regarding diet, exercise and healthy lifestyle  RTO as scheduled    Signed,  Kvng Rodriguez M.D. A total of at least 25 min was spent during this evaluation of which half was spent in counseling and care coordination. This note was created using voice recognition software.   Edits have been made but syntax errors might exist.

## 2020-07-09 LAB
ALBUMIN SERPL-MCNC: 4.3 G/DL (ref 3.8–4.8)
ALBUMIN/GLOB SERPL: 1.4 {RATIO} (ref 1.2–2.2)
ALP SERPL-CCNC: 63 IU/L (ref 39–117)
ALT SERPL-CCNC: 20 IU/L (ref 0–32)
AST SERPL-CCNC: 23 IU/L (ref 0–40)
BASOPHILS # BLD AUTO: 0 X10E3/UL (ref 0–0.2)
BASOPHILS NFR BLD AUTO: 0 %
BILIRUB SERPL-MCNC: 0.3 MG/DL (ref 0–1.2)
BUN SERPL-MCNC: 9 MG/DL (ref 6–24)
BUN/CREAT SERPL: 17 (ref 9–23)
CALCIUM SERPL-MCNC: 9.3 MG/DL (ref 8.7–10.2)
CHLORIDE SERPL-SCNC: 102 MMOL/L (ref 96–106)
CHOLEST SERPL-MCNC: 141 MG/DL (ref 100–199)
CO2 SERPL-SCNC: 25 MMOL/L (ref 20–29)
CREAT SERPL-MCNC: 0.54 MG/DL (ref 0.57–1)
EOSINOPHIL # BLD AUTO: 0.1 X10E3/UL (ref 0–0.4)
EOSINOPHIL NFR BLD AUTO: 2 %
ERYTHROCYTE [DISTWIDTH] IN BLOOD BY AUTOMATED COUNT: 12.4 % (ref 11.7–15.4)
GLOBULIN SER CALC-MCNC: 3.1 G/DL (ref 1.5–4.5)
GLUCOSE SERPL-MCNC: 74 MG/DL (ref 65–99)
HCT VFR BLD AUTO: 37.5 % (ref 34–46.6)
HDLC SERPL-MCNC: 70 MG/DL
HGB BLD-MCNC: 12.4 G/DL (ref 11.1–15.9)
IMM GRANULOCYTES # BLD AUTO: 0 X10E3/UL (ref 0–0.1)
IMM GRANULOCYTES NFR BLD AUTO: 0 %
LDLC SERPL CALC-MCNC: 52 MG/DL (ref 0–99)
LYMPHOCYTES # BLD AUTO: 2 X10E3/UL (ref 0.7–3.1)
LYMPHOCYTES NFR BLD AUTO: 42 %
MCH RBC QN AUTO: 31.6 PG (ref 26.6–33)
MCHC RBC AUTO-ENTMCNC: 33.1 G/DL (ref 31.5–35.7)
MCV RBC AUTO: 96 FL (ref 79–97)
MONOCYTES # BLD AUTO: 0.5 X10E3/UL (ref 0.1–0.9)
MONOCYTES NFR BLD AUTO: 11 %
NEUTROPHILS # BLD AUTO: 2.1 X10E3/UL (ref 1.4–7)
NEUTROPHILS NFR BLD AUTO: 45 %
PLATELET # BLD AUTO: 255 X10E3/UL (ref 150–450)
POTASSIUM SERPL-SCNC: 4.5 MMOL/L (ref 3.5–5.2)
PROT SERPL-MCNC: 7.4 G/DL (ref 6–8.5)
RBC # BLD AUTO: 3.92 X10E6/UL (ref 3.77–5.28)
SODIUM SERPL-SCNC: 144 MMOL/L (ref 134–144)
T4 FREE SERPL-MCNC: 1.25 NG/DL (ref 0.82–1.77)
TRIGL SERPL-MCNC: 93 MG/DL (ref 0–149)
TSH SERPL DL<=0.005 MIU/L-ACNC: 0.99 UIU/ML (ref 0.45–4.5)
VLDLC SERPL CALC-MCNC: 19 MG/DL (ref 5–40)
WBC # BLD AUTO: 4.7 X10E3/UL (ref 3.4–10.8)

## 2020-07-22 ENCOUNTER — OFFICE VISIT (OUTPATIENT)
Dept: INTERNAL MEDICINE CLINIC | Age: 45
End: 2020-07-22

## 2020-07-22 VITALS
DIASTOLIC BLOOD PRESSURE: 80 MMHG | OXYGEN SATURATION: 99 % | RESPIRATION RATE: 16 BRPM | HEIGHT: 62 IN | SYSTOLIC BLOOD PRESSURE: 117 MMHG | HEART RATE: 89 BPM | TEMPERATURE: 98.7 F | BODY MASS INDEX: 26.59 KG/M2 | WEIGHT: 144.5 LBS

## 2020-07-22 DIAGNOSIS — N30.01 ACUTE CYSTITIS WITH HEMATURIA: Primary | ICD-10-CM

## 2020-07-22 DIAGNOSIS — N39.0 URINARY TRACT INFECTION WITHOUT HEMATURIA, SITE UNSPECIFIED: ICD-10-CM

## 2020-07-22 LAB
BILIRUB UR QL STRIP: NEGATIVE
GLUCOSE UR-MCNC: NEGATIVE MG/DL
KETONES P FAST UR STRIP-MCNC: NEGATIVE MG/DL
PH UR STRIP: 6.5 [PH] (ref 4.6–8)
PROT UR QL STRIP: NORMAL
SP GR UR STRIP: 1.02 (ref 1–1.03)
UA UROBILINOGEN AMB POC: NORMAL (ref 0.2–1)
URINALYSIS CLARITY POC: NORMAL
URINALYSIS COLOR POC: YELLOW
URINE BLOOD POC: NORMAL
URINE LEUKOCYTES POC: NORMAL
URINE NITRITES POC: NEGATIVE

## 2020-07-22 RX ORDER — NITROFURANTOIN 25; 75 MG/1; MG/1
100 CAPSULE ORAL 2 TIMES DAILY
Qty: 14 CAP | Refills: 0 | Status: SHIPPED | OUTPATIENT
Start: 2020-07-22 | End: 2021-08-27

## 2020-07-22 NOTE — PROGRESS NOTES
Chief Complaint   Patient presents with    Bladder Infection     Patient is here for a UTI. 1. Have you been to the ER, urgent care clinic since your last visit? Hospitalized since your last visit? No    2. Have you seen or consulted any other health care providers outside of the [address] [place] [address] since your last visit? Include any pap smears or colon screening.  No

## 2020-07-22 NOTE — PATIENT INSTRUCTIONS
Urinary Tract Infection in Women: Care Instructions  Your Care Instructions     A urinary tract infection, or UTI, is a general term for an infection anywhere between the kidneys and the urethra (where urine comes out). Most UTIs are bladder infections. They often cause pain or burning when you urinate. UTIs are caused by bacteria and can be cured with antibiotics. Be sure to complete your treatment so that the infection goes away. Follow-up care is a key part of your treatment and safety. Be sure to make and go to all appointments, and call your doctor if you are having problems. It's also a good idea to know your test results and keep a list of the medicines you take. How can you care for yourself at home? · Take your antibiotics as directed. Do not stop taking them just because you feel better. You need to take the full course of antibiotics. · Drink extra water and other fluids for the next day or two. This may help wash out the bacteria that are causing the infection. (If you have kidney, heart, or liver disease and have to limit fluids, talk with your doctor before you increase your fluid intake.)  · Avoid drinks that are carbonated or have caffeine. They can irritate the bladder. · Urinate often. Try to empty your bladder each time. · To relieve pain, take a hot bath or lay a heating pad set on low over your lower belly or genital area. Never go to sleep with a heating pad in place. To prevent UTIs  · Drink plenty of water each day. This helps you urinate often, which clears bacteria from your system. (If you have kidney, heart, or liver disease and have to limit fluids, talk with your doctor before you increase your fluid intake.)  · Urinate when you need to. · Urinate right after you have sex. · Change sanitary pads often. · Avoid douches, bubble baths, feminine hygiene sprays, and other feminine hygiene products that have deodorants.   · After going to the bathroom, wipe from front to back.  When should you call for help? Call your doctor now or seek immediate medical care if:  · Symptoms such as fever, chills, nausea, or vomiting get worse or appear for the first time. · You have new pain in your back just below your rib cage. This is called flank pain. · There is new blood or pus in your urine. · You have any problems with your antibiotic medicine. Watch closely for changes in your health, and be sure to contact your doctor if:  · You are not getting better after taking an antibiotic for 2 days. · Your symptoms go away but then come back. Where can you learn more? Go to http://juan jose-rolf.info/  Enter J252 in the search box to learn more about \"Urinary Tract Infection in Women: Care Instructions. \"  Current as of: August 22, 2019               Content Version: 12.5  © 2694-4202 [employer]. Care instructions adapted under license by [employer] (which disclaims liability or warranty for this information). If you have questions about a medical condition or this instruction, always ask your healthcare professional. [address] any warranty or liability for your use of this information. Nitrofurantoin Combination (By mouth)   Nitrofurantoin Monohydrate (yvq-gfvq-wdtk-AN-toyn nwg-le-EGY-drate), Nitrofurantoin, Macrocrystals (xke-zijx-pjkp-AN-toyn VWC-lql-fpuv-tals)  Treats urinary tract infections caused by bacteria. This medicine is an antibiotic. Brand Name(s): Macrobid   There may be other brand names for this medicine. When This Medicine Should Not Be Used: You should not use this medicine if you have had an allergic reaction to nitrofurantoin or if you are in your last weeks of pregnancy (week 38 or later). You should not use this medicine if you have severe kidney disease, if you are unable to urinate, or if you have a decreased amount of urine.  Do not use this medicine if you have a history of liver disease with nitrofurantoin. How to Use This Medicine:   Capsule  · Your doctor will tell you how much medicine to use. Do not use more than directed. · It is best to take this medicine with food or milk. · Take all of the medicine in your prescription to clear up your infection, even if you feel better after the first few doses. If a dose is missed:   · Take a dose as soon as you remember. If it is almost time for your next dose, wait until then and take a regular dose. Do not take extra medicine to make up for a missed dose. How to Store and Dispose of This Medicine:   · Store the medicine in a closed container at room temperature, away from heat, moisture, and direct light. · Ask your pharmacist, doctor, or health caregiver about the best way to dispose of any outdated medicine or medicine no longer needed. · Keep all medicine out of the reach of children. Never share your medicine with anyone. Drugs and Foods to Avoid:   Ask your doctor or pharmacist before using any other medicine, including over-the-counter medicines, vitamins, and herbal products. · Make sure your doctor knows if you are also using probenecid (Benemid®) or sulfinpyrazone (Anturane®). · It is best not to use antacids containing magnesium trisilicate (such as Foamicon® or Gaviscon®) while you are using nitrofurantoin. Warnings While Using This Medicine:   · Make sure your doctor knows if you are pregnant or breastfeeding, or if you have liver disease, heart disease, lung disease, anemia, diabetes, a mineral imbalance in the blood, or vitamin B deficiency. Make sure your doctor knows if you have a condition called [id number]. · This medicine may cause your urine to be a brown color. This is normal and will not affect how the medicine works. · This medicine can cause diarrhea. Call your doctor if the diarrhea becomes severe, does not stop, or is bloody. Do not take any medicine to stop diarrhea until you have talked to your doctor. Diarrhea can occur 2 months or more after you stop taking this medicine. · Use this medicine only to treat the infection you now have. This medicine will not work for colds, flu, or other virus infections. Possible Side Effects While Using This Medicine:   Call your doctor right away if you notice any of these side effects:  · Allergic reaction: Itching or hives, swelling in your face or hands, swelling or tingling in your mouth or throat, chest tightness, trouble breathing  · Blisters, peeling, or red skin rash. · Cough, fever, chills, weakness, shortness of breath, or chest pain. · Dark-colored urine or pale stools. · Diarrhea or loose, watery stools that may contain blood. · Nausea, vomiting, loss of appetite, or pain in your upper stomach. · Numbness, tingling, or burning pain in your hands, arms, legs, or feet. · Yellowing of your skin or the whites of your eyes. If you notice these less serious side effects, talk with your doctor:   · Dizziness, headache, or blurred vision. · Hair loss. · Mild nausea, vomiting, constipation, stomach upset, or pain. · Vaginal itching or fluids. If you notice other side effects that you think are caused by this medicine, tell your doctor. Call your doctor for medical advice about side effects. You may report side effects to FDA at 6-668-FDA-6821  © 2017 [facility] Information is for End User's use only and may not be sold, redistributed or otherwise used for commercial purposes. The above information is an  only. It is not intended as medical advice for individual conditions or treatments. Talk to your doctor, nurse or pharmacist before following any medical regimen to see if it is safe and effective for you.

## 2020-07-22 NOTE — PROGRESS NOTES
[facility]  Leighann Sharif MD  [address] [address]    Chief Complaint   Patient presents with    Bladder Infection     Patient is here for a UTI. SUBJECTIVE:    Orestes Monroe is a 40 y.o. female presents for urethral discomfort when urinating. Pt has had 1 week dysuria noted after intercourse. Pt denies abdominal or back pain, fever, chills ,nausea or vomiting. Wears incontinence pads. Current Outpatient Medications   Medication Sig Dispense Refill    nitrofurantoin, macrocrystal-monohydrate, (Macrobid) 100 mg capsule Take 1 Cap by mouth two (2) times a day. 14 Cap 0    interferon beta-1a (Rebif, with albumin,) 44 mcg/0.5 mL injection 0.5 mL by SubCUTAneous route every Monday, Wednesday, Friday. Please dispense prefilled syringe per patient's request 12 Syringe 5    Biotin 2,500 mcg cap Take  by mouth.  norethindrone-ethinyl estradiol-iron (JUNEL FE 1.5/30, 28,) 1.5 mg-30 mcg (21)/75 mg (7) tablet Take 1 Tab by mouth daily.  LORATADINE (CLARITIN PO) Take 10 mg by mouth as needed.       Arm Brace misc Left carpal tunnel splint to be used every night 1 Each 0     Past Medical History:   Diagnosis Date    Ill-defined condition     uterine fibroids    Multiple sclerosis (HCC)     Other ill-defined conditions(799.89)     sleep disorder and breast fibroids    Other ill-defined conditions(799.89)     left arm numbness at times    Unspecified breast disorder      Past Surgical History:   Procedure Laterality Date    ABDOMEN SURGERY PROC UNLISTED      myomectomy    HX GYN      fibroids    HX HEENT      tooth extraction    HX OTHER SURGICAL  2015    laparoscopoy      No Known Allergies    REVIEW OF SYSTEMS:  Pertinent positives and negatives are listed in HPI      Social History     Socioeconomic History    Marital status: SINGLE     Spouse name: Not on file    Number of children: Not on file    Years of education: Not on file    Highest education level: Not on file   Tobacco Use    Smoking status: Never Smoker    Smokeless tobacco: Never Used   Substance and Sexual Activity    Alcohol use: Yes     Comment: occasionally    Drug use: Yes     Types: Marijuana    Sexual activity: Not Currently     Partners: Male     Birth control/protection: Pill, Condom     Family History   Problem Relation Age of Onset    Bleeding Prob Mother     Endometriosis Mother     Cancer Maternal Grandmother         lung    Hypertension Maternal Grandfather     Stroke Maternal Grandfather        OBJECTIVE:     Visit Vitals  /80   Pulse 89   Temp 98.7 °F (37.1 °C)   Resp 16 Comment: 16   Ht 5' 2\" (1.575 m)   Wt 144 lb 8 oz (65.5 kg)   LMP 07/04/2020 (Approximate)   SpO2 99%   BMI 26.43 kg/m²     General:  alert, cooperative, no distress   Abdomen: soft, nontender, nondistended, no masses or organomegaly. Back:  CVA tenderness absent   :  defer exam       Office Visit on 07/08/2020   Component Date Value Ref Range Status    WBC 07/08/2020 4.7  3.4 - 10.8 x10E3/uL Final    RBC 07/08/2020 3.92  3.77 - 5.28 x10E6/uL Final    HGB 07/08/2020 12.4  11.1 - 15.9 g/dL Final    HCT 07/08/2020 37.5  34.0 - 46.6 % Final    MCV 07/08/2020 96  79 - 97 fL Final    MCH 07/08/2020 31.6  26.6 - 33.0 pg Final    MCHC 07/08/2020 33.1  31.5 - 35.7 g/dL Final    RDW 07/08/2020 12.4  11.7 - 15.4 % Final    PLATELET 32/77/8696 970  150 - 450 x10E3/uL Final    NEUTROPHILS 07/08/2020 45  Not Estab. % Final    Lymphocytes 07/08/2020 42  Not Estab. % Final    MONOCYTES 07/08/2020 11  Not Estab. % Final    EOSINOPHILS 07/08/2020 2  Not Estab. % Final    BASOPHILS 07/08/2020 0  Not Estab. % Final    ABS. NEUTROPHILS 07/08/2020 2.1  1.4 - 7.0 x10E3/uL Final    Abs Lymphocytes 07/08/2020 2.0  0.7 - 3.1 x10E3/uL Final    ABS. MONOCYTES 07/08/2020 0.5  0.1 - 0.9 x10E3/uL Final    ABS. EOSINOPHILS 07/08/2020 0.1  0.0 - 0.4 x10E3/uL Final    ABS.  BASOPHILS 07/08/2020 0.0  0.0 - 0.2 x10E3/uL Final    IMMATURE GRANULOCYTES 07/08/2020 0  Not Estab. % Final    ABS. IMM. GRANS. 07/08/2020 0.0  0.0 - 0.1 x10E3/uL Final    Glucose 07/08/2020 74  65 - 99 mg/dL Final    BUN 07/08/2020 9  6 - 24 mg/dL Final    Creatinine 07/08/2020 0.54* 0.57 - 1.00 mg/dL Final    GFR est non-AA 07/08/2020 115  >59 mL/min/1.73 Final    GFR est AA 07/08/2020 133  >59 mL/min/1.73 Final    BUN/Creatinine ratio 07/08/2020 17  9 - 23 Final    Sodium 07/08/2020 144  134 - 144 mmol/L Final    Potassium 07/08/2020 4.5  3.5 - 5.2 mmol/L Final    Chloride 07/08/2020 102  96 - 106 mmol/L Final    CO2 07/08/2020 25  20 - 29 mmol/L Final    Calcium 07/08/2020 9.3  8.7 - 10.2 mg/dL Final    Protein, total 07/08/2020 7.4  6.0 - 8.5 g/dL Final    Albumin 07/08/2020 4.3  3.8 - 4.8 g/dL Final    GLOBULIN, TOTAL 07/08/2020 3.1  1.5 - 4.5 g/dL Final    A-G Ratio 07/08/2020 1.4  1.2 - 2.2 Final    Bilirubin, total 07/08/2020 0.3  0.0 - 1.2 mg/dL Final    Alk. phosphatase 07/08/2020 63  39 - 117 IU/L Final    AST (SGOT) 07/08/2020 23  0 - 40 IU/L Final    ALT (SGPT) 07/08/2020 20  0 - 32 IU/L Final    Cholesterol, total 07/08/2020 141  100 - 199 mg/dL Final    Triglyceride 07/08/2020 93  0 - 149 mg/dL Final    HDL Cholesterol 07/08/2020 70  >39 mg/dL Final    VLDL, calculated 07/08/2020 19  5 - 40 mg/dL Final    LDL, calculated 07/08/2020 52  0 - 99 mg/dL Final    TSH 07/08/2020 0.989  0.450 - 4.500 uIU/mL Final    T4, Free 07/08/2020 1.25  0.82 - 1.77 ng/dL Final       ASSESSMENT:   1. Acute cystitis with hematuria    2. MS- stable    I have discussed the diagnosis with the patient and the intended plan as seen in the  orders above. The patient understands and agees with the plan. The patient has   received an after visit summary and questions were answered concerning  future plans  Patient labs and/or xrays were reviewed  Past records were reviewed.     PLAN:  .  Orders Placed This Encounter    AMB POC URINALYSIS DIP STICK AUTO W/O MICRO    nitrofurantoin, macrocrystal-monohydrate, (Macrobid) 100 mg capsule   Advised patient to call back or return to office if symptoms worsen/change/persist.  Discussed expected course/resolution/complications of diagnosis in detail with patient. Medication risks/benefits/costs/interactions/alternatives discussed with patient    Follow-up and Dispositions    · Return in about 2 weeks (around 8/5/2020), or submit urine. Leticia Zuniga M.D. This note was created using voice recognition software.   Edits have been made but syntax errors might exist.

## 2020-07-24 ENCOUNTER — OFFICE VISIT (OUTPATIENT)
Dept: NEUROLOGY | Age: 45
End: 2020-07-24

## 2020-07-24 VITALS
WEIGHT: 142 LBS | HEART RATE: 80 BPM | DIASTOLIC BLOOD PRESSURE: 70 MMHG | SYSTOLIC BLOOD PRESSURE: 122 MMHG | BODY MASS INDEX: 25.97 KG/M2 | OXYGEN SATURATION: 97 % | TEMPERATURE: 98.4 F

## 2020-07-24 DIAGNOSIS — G35 MULTIPLE SCLEROSIS (HCC): Primary | ICD-10-CM

## 2020-07-24 DIAGNOSIS — G56.02 CARPAL TUNNEL SYNDROME OF LEFT WRIST: ICD-10-CM

## 2020-07-24 DIAGNOSIS — R53.83 FATIGUE, UNSPECIFIED TYPE: ICD-10-CM

## 2020-07-24 NOTE — PROGRESS NOTES
Neurology follow-up note    Chief Complaint   Patient presents with   Steve Estrada is a 40 y.o. female who presented to the neurology office for management of multiple sclerosis. At the age of 29 she did have left-sided numbness that had been improving and now she does have decreased sensation in her left hand. She was diagnosed with multiple sclerosis in November 2016 as at that time she did have sudden onset right leg weakness and it gets weak and fatigued. In January she did have diplopia. All the symptoms are better now. She initially tried Copaxone for 2 weeks but had hot flashes and was feeling sick and hence was switched over to Rebif December/January 2017 and has been doing better. No recent exacerbations. Patient last MRI was in 2017. Patient is complaining of fatigue at work. Sometimes she goes to sleep while at work. Patient now has a sit and stand desk and that helps the patient. Patient did have EMG/nerve conduction study which showed left carpal tunnel syndrome    Interval history:  - Pt did have UTI. She has urinary incontinence and is following up with her urologist.  Narvis Lozada exercise was recommended by urology. - Patient's left carpal tunnel syndrome symptoms have resolved. She does not have the need to use splints anymore. Current Outpatient Medications   Medication Sig    nitrofurantoin, macrocrystal-monohydrate, (Macrobid) 100 mg capsule Take 1 Cap by mouth two (2) times a day.  interferon beta-1a (Rebif, with albumin,) 44 mcg/0.5 mL injection 0.5 mL by SubCUTAneous route every Monday, Wednesday, Friday. Please dispense prefilled syringe per patient's request    Biotin 2,500 mcg cap Take  by mouth. Takes 10,000    norethindrone-ethinyl estradiol-iron (JUNEL FE 1.5/30, 28,) 1.5 mg-30 mcg (21)/75 mg (7) tablet Take 1 Tab by mouth daily.  LORATADINE (CLARITIN PO) Take 10 mg by mouth as needed.     Arm Brace misc Left carpal tunnel splint to be used every night     No current facility-administered medications for this visit. Past Medical History:   Diagnosis Date    Ill-defined condition     uterine fibroids    Multiple sclerosis (HCC)     Other ill-defined conditions(799.89)     sleep disorder and breast fibroids    Other ill-defined conditions(799.89)     left arm numbness at times    Unspecified breast disorder      Past Surgical History:   Procedure Laterality Date    ABDOMEN SURGERY PROC UNLISTED      myomectomy    HX GYN      fibroids    HX HEENT      tooth extraction    HX OTHER SURGICAL  2015    laparoscopoy      Family History   Problem Relation Age of Onset    Bleeding Prob Mother     Endometriosis Mother     Cancer Maternal Grandmother         lung    Hypertension Maternal Grandfather     Stroke Maternal Grandfather      Social History     Tobacco Use    Smoking status: Never Smoker    Smokeless tobacco: Never Used   Substance Use Topics    Alcohol use: Not Currently     Comment: occasionally    Drug use: Yes     Types: Marijuana       REVIEW OF SYSTEMS:   A ten system review of constitutional, cardiovascular, respiratory, musculoskeletal, endocrine, skin, SHEENT, genitourinary, psychiatric and neurologic systems was obtained and is unremarkable except left-sided numbness and right leg weakness    EXAMINATION:   Visit Vitals  /70   Pulse 80   Temp 98.4 °F (36.9 °C)   Wt 142 lb (64.4 kg)   LMP 07/04/2020 (Approximate)   SpO2 97%   BMI 25.97 kg/m²        General:   General appearance: Pt is in no acute distress   Distal pulses are preserved     Neurological Examination:   Mental Status: AAO x3. Speech is fluent. Follows commands, has normal fund of knowledge, attention, short term recall, comprehension and insight.      Cranial Nerves: Visual fields are full. PERRL, Extraocular movements are full. Facial sensation intact V1- V3. Facial movement intact, symmetric. Hearing intact to conversation.  Palate elevates symmetrically. Shoulder shrug symmetric. Tongue midline.      Motor: Strength is 5/5 in all 4 ext.     Sensation: Intact to pinprick sensation     Coordination/Cerebellar: Intact to finger-nose-finger      Skin: No significant bruising or lacerations. Laboratory review:   Results for orders placed or performed in visit on 07/22/20   AMB POC URINALYSIS DIP STICK AUTO W/O MICRO   Result Value Ref Range    Color (UA POC) Yellow     Clarity (UA POC) Cloudy     Glucose (UA POC) Negative Negative    Bilirubin (UA POC) Negative Negative    Ketones (UA POC) Negative Negative    Specific gravity (UA POC) 1.025 1.001 - 1.035    Blood (UA POC) 2+ Negative    pH (UA POC) 6.5 4.6 - 8.0    Protein (UA POC) 2+ Negative    Urobilinogen (UA POC) 1 mg/dL 0.2 - 1    Nitrites (UA POC) Negative Negative    Leukocyte esterase (UA POC) 4+ Negative       Imaging review:  7/8/2019  MRI brain with and without contrast  Stable numerous white matter lesions with the appearance and pattern consistent with demyelinating disease. No evidence of active demyelinating plaque    MRI cervical spine with and without contrast  Numerous cord lesions again shown without enhancement. Interval inferior right posterior lateral C5, T1 and T2 hypointense lesion. Given the appearance of a new finding at C5, further evaluation with a bone scan, SPECT is recommended to determine whether these in radionucleotide avid lesions. 6/27/2019  EMG/nerve conduction study  Mild left carpal tunnel syndrome. There is no electrophysiological evidence of a left cervical radiculopathy    9/14/2017  MRI brain with and without contrast  Multifocal white matter T2 hyperintensity in pattern consistent with medical history of multiple sclerosis. No abdominal intracranial enhancement. MRI cervical spine with and without contrast  Greater extent of cord signal alteration in the interval for the equivocal midline anterior cord enhancement at C3 level.   Finding consistent with patient's diagnosis of MS. MRI thoracic spine with and without contrast  Stable extent of nonenhancing cord lesion and thoracic spine in keeping with patient's clinical diagnosis of MS.    10/4/2016  MRI thoracic spine with and without contrast  Multifocal thoracic cord signal alteration without evidence for cord enhancement. Lower cervical spine degenerative disc disease. No thoracic spine degenerative disc disease or facet arthrosis. 7/13/2016  MRI cervical spine with and without contrast  Abnormal T2 signal in the normal caliber cervical spinal cord spanning C1 through C7 and is compatible with provided diagnosis of chronic demyelinating disease. No MRI evidence of active demyelination. Documentation review:  None    Assessment/Plan:   1. Multiple sclerosis  The patient is doing well on Rebif that she started around January of 2017 and wants to switch to oral medication. Did discuss about Tecfidera in detail including the risks and benefits. Patient is agreeable to try it. I am going to complete the paperwork for the medication. 2.  Fatigue  The patient is having fatigue at work. Modafinil was not approved by insurance. She does have a sit and stand desk and that helps her. 3.  Left carpal tunnel syndrome  Symptoms are better with left carpal tunnel splint. She is not using it anymore. Follow-up in 3 months     Over 40 minutes was spent with the patient and family of which > 50% of the visit was spent counseling on diagnosis, management, and treatment of the multiple sclerosis and tecfidera        ICD-10-CM ICD-9-CM    1. Multiple sclerosis ([facility] [address]  G35 340    2. Fatigue, unspecified type  R53.83 780.79    3. Carpal tunnel syndrome of left wrist  G56.02 354.0         Penelope Whitaker MD  Neurologist    CC: None  Fax: None    This note was created using voice recognition software. Despite editing, there may be syntax errors.    This note will not be viewable in MyChart.

## 2020-07-28 LAB
APPEARANCE UR: CLEAR
BACTERIA #/AREA URNS HPF: ABNORMAL /[HPF]
BACTERIA UR CULT: ABNORMAL
BILIRUB UR QL STRIP: NEGATIVE
CASTS URNS QL MICRO: ABNORMAL /LPF
COLOR UR: YELLOW
EPI CELLS #/AREA URNS HPF: ABNORMAL /HPF (ref 0–10)
GLUCOSE UR QL: NEGATIVE
HGB UR QL STRIP: ABNORMAL
KETONES UR QL STRIP: NEGATIVE
LEUKOCYTE ESTERASE UR QL STRIP: NEGATIVE
MICRO URNS: ABNORMAL
NITRITE UR QL STRIP: NEGATIVE
PH UR STRIP: 6.5 [PH] (ref 5–7.5)
PROT UR QL STRIP: ABNORMAL
RBC #/AREA URNS HPF: ABNORMAL /HPF (ref 0–2)
SP GR UR: 1.02 (ref 1–1.03)
UROBILINOGEN UR STRIP-MCNC: 1 MG/DL (ref 0.2–1)
WBC #/AREA URNS HPF: ABNORMAL /HPF (ref 0–5)

## 2020-07-31 ENCOUNTER — DOCUMENTATION ONLY (OUTPATIENT)
Dept: NEUROLOGY | Age: 45
End: 2020-07-31

## 2020-08-04 ENCOUNTER — TELEPHONE (OUTPATIENT)
Dept: NEUROLOGY | Age: 45
End: 2020-08-04

## 2020-08-04 DIAGNOSIS — G35 MULTIPLE SCLEROSIS (HCC): Primary | ICD-10-CM

## 2020-08-04 RX ORDER — DIMETHYL FUMARATE 240 MG/1
240 CAPSULE ORAL 2 TIMES DAILY
Qty: 180 CAP | Refills: 3 | Status: SHIPPED | OUTPATIENT
Start: 2020-08-04 | End: 2021-07-19

## 2020-08-04 NOTE — TELEPHONE ENCOUNTER
Tecfidera starter digna and maintenance dose approved for one year to 8/3/21. Faxed to [facility]. Rebif needs to be cancelled in med list and Tecfidera added.

## 2020-08-05 ENCOUNTER — CLINICAL SUPPORT (OUTPATIENT)
Dept: INTERNAL MEDICINE CLINIC | Age: 45
End: 2020-08-05

## 2020-08-05 DIAGNOSIS — N39.0 URINARY TRACT INFECTION WITHOUT HEMATURIA, SITE UNSPECIFIED: Primary | ICD-10-CM

## 2020-08-06 ENCOUNTER — TELEPHONE (OUTPATIENT)
Dept: NEUROLOGY | Age: 45
End: 2020-08-06

## 2020-08-06 LAB
APPEARANCE UR: CLEAR
BACTERIA #/AREA URNS HPF: ABNORMAL /[HPF]
BILIRUB UR QL STRIP: NEGATIVE
CASTS URNS QL MICRO: ABNORMAL /LPF
COLOR UR: YELLOW
EPI CELLS #/AREA URNS HPF: ABNORMAL /HPF (ref 0–10)
GLUCOSE UR QL: NEGATIVE
HGB UR QL STRIP: ABNORMAL
KETONES UR QL STRIP: NEGATIVE
LEUKOCYTE ESTERASE UR QL STRIP: NEGATIVE
MICRO URNS: ABNORMAL
NITRITE UR QL STRIP: NEGATIVE
PH UR STRIP: 6.5 [PH] (ref 5–7.5)
PROT UR QL STRIP: ABNORMAL
RBC #/AREA URNS HPF: ABNORMAL /HPF (ref 0–2)
SP GR UR: 1.03 (ref 1–1.03)
UROBILINOGEN UR STRIP-MCNC: 1 MG/DL (ref 0.2–1)
WBC #/AREA URNS HPF: ABNORMAL /HPF (ref 0–5)

## 2020-08-06 NOTE — TELEPHONE ENCOUNTER
----- Message from Talisha Tran Page sent at 8/6/2020  8:28 AM EDT -----  Regarding: Dr. Kenn Prieto first and last name: Patient      Reason for call: PA request      Callback required yes/no and why: Yes. To advise      Best contact number(s): [phone]      Details to clarify the request: The medication \" Texederma\" requires a PA. Please contact [address] [place] [address] to initiate the process Phone :[phone].  Patient was provided a quick start but was notified of the PA requirement      Kaylan Mo

## 2020-09-10 ENCOUNTER — HOSPITAL ENCOUNTER (OUTPATIENT)
Dept: ULTRASOUND IMAGING | Age: 45
Discharge: HOME | End: 2020-09-10
Attending: FAMILY MEDICINE
Payer: COMMERCIAL

## 2020-09-10 DIAGNOSIS — E01.0 THYROMEGALY: ICD-10-CM

## 2020-09-10 PROCEDURE — 76536 US EXAM OF HEAD AND NECK: CPT

## 2020-09-14 ENCOUNTER — OFFICE VISIT (OUTPATIENT)
Dept: INTERNAL MEDICINE CLINIC | Age: 45
End: 2020-09-14
Payer: COMMERCIAL

## 2020-09-14 VITALS
RESPIRATION RATE: 16 BRPM | SYSTOLIC BLOOD PRESSURE: 130 MMHG | WEIGHT: 142.1 LBS | BODY MASS INDEX: 26.15 KG/M2 | TEMPERATURE: 98.8 F | OXYGEN SATURATION: 98 % | HEIGHT: 62 IN | HEART RATE: 100 BPM | DIASTOLIC BLOOD PRESSURE: 83 MMHG

## 2020-09-14 DIAGNOSIS — E04.1 THYROID NODULE: Primary | ICD-10-CM

## 2020-09-14 DIAGNOSIS — R31.21 ASYMPTOMATIC MICROSCOPIC HEMATURIA: ICD-10-CM

## 2020-09-14 PROCEDURE — 99213 OFFICE O/P EST LOW 20 MIN: CPT | Performed by: FAMILY MEDICINE

## 2020-09-14 NOTE — PROGRESS NOTES
Chief Complaint   Patient presents with    Thyroid Problem     Patient is here for her Thyroid. Patient states she can feel her Thyroid. 1. Have you been to the ER, urgent care clinic since your last visit? Hospitalized since your last visit? No    2. Have you seen or consulted any other health care providers outside of the [address] [place] [address] since your last visit? Include any pap smears or colon screening.  No

## 2020-09-14 NOTE — PROGRESS NOTES
[facility]  Cornel Ch. MD Chante  [address] [address]    Chief Complaint   Patient presents with    Thyroid Problem     Patient is here for her Thyroid. Patient states she can feel her Thyroid. SUBJECTIVE:    Joaquina Traore is a 39 y.o. female for thyroid nodule follow-up. Patient has a history of thyroid nodule with suspected growth after developing neck discomfort over the past month. The nodules been biopsied in the past.  She had a repeat thyroid ultrasound in July. Patient completed antibiotic for urinary tract infection last visit. She is asymptomatic. Current Outpatient Medications   Medication Sig Dispense Refill    dimethyl fumarate (Tecfidera) 240 mg cpDR Take 240 mg by mouth two (2) times a day. 180 Cap 3    nitrofurantoin, macrocrystal-monohydrate, (Macrobid) 100 mg capsule Take 1 Cap by mouth two (2) times a day. 14 Cap 0    Biotin 2,500 mcg cap Take  by mouth. Takes 10,000      norethindrone-ethinyl estradiol-iron (JUNEL FE 1.5/30, 28,) 1.5 mg-30 mcg (21)/75 mg (7) tablet Take 1 Tab by mouth daily.  LORATADINE (CLARITIN PO) Take 10 mg by mouth as needed.       Arm Brace misc Left carpal tunnel splint to be used every night 1 Each 0     Past Medical History:   Diagnosis Date    Ill-defined condition     uterine fibroids    Multiple sclerosis (HCC)     Other ill-defined conditions(799.89)     sleep disorder and breast fibroids    Other ill-defined conditions(799.89)     left arm numbness at times    Unspecified breast disorder      Past Surgical History:   Procedure Laterality Date    ABDOMEN SURGERY PROC UNLISTED      myomectomy    HX GYN      fibroids    HX HEENT      tooth extraction    HX OTHER SURGICAL  2015    laparoscopoy      No Known Allergies    REVIEW OF SYSTEMS:  General: negative for - chills or fever  ENT: negative for - headaches, nasal congestion or tinnitus  Respiratory: negative for - cough, hemoptysis, shortness of breath or wheezing  Cardiovascular : negative for - chest pain, edema, palpitations or shortness of breath  Gastrointestinal: negative for - abdominal pain, blood in stools, heartburn or nausea/vomiting  Genito-Urinary: no dysuria, trouble voiding, or hematuria  Musculoskeletal: negative for - gait disturbance, joint pain, joint stiffness or joint swelling  Neurological: no TIA or stroke symptoms  Hematologic: no bruises, no bleeding, no swollen glands  Integument: no lumps, mole changes, nail changes or rash  Endocrine:no malaise/lethargy or unexpected weight changes      Social History     Socioeconomic History    Marital status: SINGLE     Spouse name: Not on file    Number of children: Not on file    Years of education: Not on file    Highest education level: Not on file   Tobacco Use    Smoking status: Never Smoker    Smokeless tobacco: Never Used   Substance and Sexual Activity    Alcohol use: Not Currently     Comment: occasionally    Drug use: Yes     Types: Marijuana    Sexual activity: Not Currently     Partners: Male     Birth control/protection: Pill, Condom     Family History   Problem Relation Age of Onset    Bleeding Prob Mother     Endometriosis Mother     Cancer Maternal Grandmother         lung    Hypertension Maternal Grandfather     Stroke Maternal Grandfather        OBJECTIVE:     Visit Vitals  /83   Pulse 100   Temp 98.8 °F (37.1 °C)   Resp 16   Ht 5' 2\" (1.575 m)   Wt 142 lb 1.6 oz (64.5 kg)   SpO2 98%   BMI 25.99 kg/m²     CONSTITUTIONAL: Appears in usual state of health  EYES eom intact  ENMT:moist mucous membranes, pharynx clear  NECK: supple.  Thyroid-tender nodule on right side  RESPIRATORY: Chest: clear bilaterally  CARDIOVASCULAR: Heart: regular rate and rhythm  GASTROINTESTINAL: Abdomen: soft, bowel sounds active  HEMATOLOGIC: no pathological lymph nodes palpated  MUSCULOSKELETAL: Extremities: no edema  MENTAL STATUS: alert and oriented, appropriate affect     Office Visit on 09/14/2020   Component Date Value Ref Range Status    Specific Gravity 09/14/2020      >=1.030* 1.005 - 1.030 Final    pH (UA) 09/14/2020 6.5  5.0 - 7.5 Final    Color 09/14/2020 Yellow  Yellow Final    Appearance 09/14/2020 Clear  Clear Final    Leukocyte Esterase 09/14/2020 Negative  Negative Final    Protein 09/14/2020 1+* Negative/Trace Final    Glucose 09/14/2020 Negative  Negative Final    Ketone 09/14/2020 2+* Negative Final    Blood 09/14/2020 Negative  Negative Final    Bilirubin 09/14/2020 Negative  Negative Final    Urobilinogen 09/14/2020 2.0* 0.2 - 1.0 mg/dL Final    Nitrites 09/14/2020 Negative  Negative Final    Microscopic Examination 09/14/2020 See additional order   Final    Microscopic was indicated and was performed.  WBC 09/14/2020 0-5  0 - 5 /hpf Final    RBC 09/14/2020 0-2  0 - 2 /hpf Final    Epithelial cells 09/14/2020 0-10  0 - 10 /hpf Final    Casts 09/14/2020 Present* None seen /lpf Final    Cast type 09/14/2020 Hyaline casts  N/A Final    Mucus 09/14/2020 Present  Not Estab. Final    Bacteria 09/14/2020 None seen  None seen/Few Final   Clinical Support on 08/05/2020   Component Date Value Ref Range Status    Specific Gravity 08/05/2020 1.026  1.005 - 1.030 Final    pH (UA) 08/05/2020 6.5  5.0 - 7.5 Final    Color 08/05/2020 Yellow  Yellow Final    Appearance 08/05/2020 Clear  Clear Final    Leukocyte Esterase 08/05/2020 Negative  Negative Final    Protein 08/05/2020 Trace  Negative/Trace Final    Glucose 08/05/2020 Negative  Negative Final    Ketone 08/05/2020 Negative  Negative Final    Blood 08/05/2020 2+* Negative Final    Bilirubin 08/05/2020 Negative  Negative Final    Urobilinogen 08/05/2020 1.0  0.2 - 1.0 mg/dL Final    Nitrites 08/05/2020 Negative  Negative Final    Microscopic Examination 08/05/2020 See additional order   Final    Microscopic was indicated and was performed.     WBC 08/05/2020 0-5  0 - 5 /hpf Final    RBC 08/05/2020 3-10* 0 - 2 /hpf Final    Epithelial cells 08/05/2020 None seen  0 - 10 /hpf Final    Casts 08/05/2020 None seen  None seen /lpf Final    Bacteria 08/05/2020 Few  None seen/Few Final   Office Visit on 07/22/2020   Component Date Value Ref Range Status    Color (UA POC) 07/22/2020 Yellow   Final    Clarity (UA POC) 07/22/2020 Cloudy   Final    Glucose (UA POC) 07/22/2020 Negative  Negative Final    Bilirubin (UA POC) 07/22/2020 Negative  Negative Final    Ketones (UA POC) 07/22/2020 Negative  Negative Final    Specific gravity (UA POC) 07/22/2020 1.025  1.001 - 1.035 Final    Blood (UA POC) 07/22/2020 2+  Negative Final    pH (UA POC) 07/22/2020 6.5  4.6 - 8.0 Final    Protein (UA POC) 07/22/2020 2+  Negative Final    Urobilinogen (UA POC) 07/22/2020 1 mg/dL  0.2 - 1 Final    Nitrites (UA POC) 07/22/2020 Negative  Negative Final    Leukocyte esterase (UA POC) 07/22/2020 4+  Negative Final    Specific Gravity 07/22/2020 1.020  1.005 - 1.030 Final    pH (UA) 07/22/2020 6.5  5.0 - 7.5 Final    Color 07/22/2020 Yellow  Yellow Final    Appearance 07/22/2020 Clear  Clear Final    Leukocyte Esterase 07/22/2020 Negative  Negative Final    Protein 07/22/2020 Trace  Negative/Trace Final    Glucose 07/22/2020 Negative  Negative Final    Ketone 07/22/2020 Negative  Negative Final    Blood 07/22/2020 Trace* Negative Final    Bilirubin 07/22/2020 Negative  Negative Final    Urobilinogen 07/22/2020 1.0  0.2 - 1.0 mg/dL Final    Nitrites 07/22/2020 Negative  Negative Final    Microscopic Examination 07/22/2020 See additional order   Final    Microscopic was indicated and was performed.     Urine Culture, Routine 07/22/2020 *  Final                    Value:Staphylococcus epidermidis  50,000-100,000 colony forming units per mL      Comment: Based on susceptibility to oxacillin this isolate would be  susceptible to:  *Penicillinase-stable penicillins, such as:    Cloxacillin, Dicloxacillin, Nafcillin  *Beta-lactam combination agents, such as:    Amoxicillin-clavulanic acid, Ampicillin-sulbactam,    Piperacillin-tazobactam  *Oral cephems, such as:    Cefaclor, Cefdinir, Cefpodoxime, Cefprozil, Cefuroxime,    Cephalexin, Loracarbef  *Parenteral cephems, such as:    Cefazolin, Cefepime, Cefotaxime, Cefotetan, Ceftaroline,    Ceftizoxime, Ceftriaxone, Cefuroxime  *Carbapenems, such as:    Doripenem, Ertapenem, Imipenem, Meropenem      WBC 07/22/2020 None seen  0 - 5 /hpf Final    RBC 07/22/2020 0-2  0 - 2 /hpf Final    Epithelial cells 07/22/2020 0-10  0 - 10 /hpf Final    Casts 07/22/2020 None seen  None seen /lpf Final    Bacteria 07/22/2020 Many* None seen/Few Final   Office Visit on 07/08/2020   Component Date Value Ref Range Status    WBC 07/08/2020 4.7  3.4 - 10.8 x10E3/uL Final    RBC 07/08/2020 3.92  3.77 - 5.28 x10E6/uL Final    HGB 07/08/2020 12.4  11.1 - 15.9 g/dL Final    HCT 07/08/2020 37.5  34.0 - 46.6 % Final    MCV 07/08/2020 96  79 - 97 fL Final    MCH 07/08/2020 31.6  26.6 - 33.0 pg Final    MCHC 07/08/2020 33.1  31.5 - 35.7 g/dL Final    RDW 07/08/2020 12.4  11.7 - 15.4 % Final    PLATELET 77/69/2908 803  150 - 450 x10E3/uL Final    NEUTROPHILS 07/08/2020 45  Not Estab. % Final    Lymphocytes 07/08/2020 42  Not Estab. % Final    MONOCYTES 07/08/2020 11  Not Estab. % Final    EOSINOPHILS 07/08/2020 2  Not Estab. % Final    BASOPHILS 07/08/2020 0  Not Estab. % Final    ABS. NEUTROPHILS 07/08/2020 2.1  1.4 - 7.0 x10E3/uL Final    Abs Lymphocytes 07/08/2020 2.0  0.7 - 3.1 x10E3/uL Final    ABS. MONOCYTES 07/08/2020 0.5  0.1 - 0.9 x10E3/uL Final    ABS. EOSINOPHILS 07/08/2020 0.1  0.0 - 0.4 x10E3/uL Final    ABS. BASOPHILS 07/08/2020 0.0  0.0 - 0.2 x10E3/uL Final    IMMATURE GRANULOCYTES 07/08/2020 0  Not Estab. % Final    ABS. IMM.  GRANS. 07/08/2020 0.0  0.0 - 0.1 x10E3/uL Final    Glucose 07/08/2020 74  65 - 99 mg/dL Final    BUN 07/08/2020 9  6 - 24 mg/dL Final  Creatinine 07/08/2020 0.54* 0.57 - 1.00 mg/dL Final    GFR est non-AA 07/08/2020 115  >59 mL/min/1.73 Final    GFR est AA 07/08/2020 133  >59 mL/min/1.73 Final    BUN/Creatinine ratio 07/08/2020 17  9 - 23 Final    Sodium 07/08/2020 144  134 - 144 mmol/L Final    Potassium 07/08/2020 4.5  3.5 - 5.2 mmol/L Final    Chloride 07/08/2020 102  96 - 106 mmol/L Final    CO2 07/08/2020 25  20 - 29 mmol/L Final    Calcium 07/08/2020 9.3  8.7 - 10.2 mg/dL Final    Protein, total 07/08/2020 7.4  6.0 - 8.5 g/dL Final    Albumin 07/08/2020 4.3  3.8 - 4.8 g/dL Final    GLOBULIN, TOTAL 07/08/2020 3.1  1.5 - 4.5 g/dL Final    A-G Ratio 07/08/2020 1.4  1.2 - 2.2 Final    Bilirubin, total 07/08/2020 0.3  0.0 - 1.2 mg/dL Final    Alk. phosphatase 07/08/2020 63  39 - 117 IU/L Final    AST (SGOT) 07/08/2020 23  0 - 40 IU/L Final    ALT (SGPT) 07/08/2020 20  0 - 32 IU/L Final    Cholesterol, total 07/08/2020 141  100 - 199 mg/dL Final    Triglyceride 07/08/2020 93  0 - 149 mg/dL Final    HDL Cholesterol 07/08/2020 70  >39 mg/dL Final    VLDL, calculated 07/08/2020 19  5 - 40 mg/dL Final    LDL, calculated 07/08/2020 52  0 - 99 mg/dL Final    TSH 07/08/2020 0.989  0.450 - 4.500 uIU/mL Final    T4, Free 07/08/2020 1.25  0.82 - 1.77 ng/dL Final       ASSESSMENT:   1. Right thyroid nodule    2. uti resolved clinically   3. Multiple sclerosis, stable, followed by neurology    I have discussed the diagnosis with the patient and the intended plan as seen in the  orders. The patient understands and agees with the plan. The patient has   received an after visit summary and questions were answered concerning  future plans  Patient labs and/or xrays were reviewed  Past records were reviewed. PLAN:  .  Orders Placed This Encounter   Madeline Larson     Signed,  Marie Schultz M.D. This note was created using voice recognition software. Edits have been made but syntax errors might exist.

## 2020-09-15 LAB
APPEARANCE UR: CLEAR
BACTERIA #/AREA URNS HPF: ABNORMAL /[HPF]
BILIRUB UR QL STRIP: NEGATIVE
CASTS URNS MICRO: ABNORMAL
CASTS URNS QL MICRO: PRESENT /LPF
COLOR UR: YELLOW
EPI CELLS #/AREA URNS HPF: ABNORMAL /HPF (ref 0–10)
GLUCOSE UR QL: NEGATIVE
HGB UR QL STRIP: NEGATIVE
KETONES UR QL STRIP: ABNORMAL
LEUKOCYTE ESTERASE UR QL STRIP: NEGATIVE
MICRO URNS: ABNORMAL
MUCOUS THREADS URNS QL MICRO: PRESENT
NITRITE UR QL STRIP: NEGATIVE
PH UR STRIP: 6.5 [PH] (ref 5–7.5)
PROT UR QL STRIP: ABNORMAL
RBC #/AREA URNS HPF: ABNORMAL /HPF (ref 0–2)
SP GR UR: >=1.03 (ref 1–1.03)
UROBILINOGEN UR STRIP-MCNC: 2 MG/DL (ref 0.2–1)
WBC #/AREA URNS HPF: ABNORMAL /HPF (ref 0–5)

## 2020-10-08 ENCOUNTER — HOSPITAL ENCOUNTER (OUTPATIENT)
Dept: MAMMOGRAPHY | Age: 45
Discharge: HOME | End: 2020-10-08
Attending: OBSTETRICS & GYNECOLOGY
Payer: COMMERCIAL

## 2020-10-08 DIAGNOSIS — Z12.31 VISIT FOR SCREENING MAMMOGRAM: ICD-10-CM

## 2020-10-08 PROCEDURE — 77063 BREAST TOMOSYNTHESIS BI: CPT

## 2020-11-12 ENCOUNTER — OFFICE VISIT (OUTPATIENT)
Dept: NEUROLOGY | Age: 45
End: 2020-11-12
Payer: COMMERCIAL

## 2020-11-12 VITALS
HEART RATE: 88 BPM | BODY MASS INDEX: 26.13 KG/M2 | SYSTOLIC BLOOD PRESSURE: 110 MMHG | OXYGEN SATURATION: 98 % | HEIGHT: 62 IN | WEIGHT: 142 LBS | DIASTOLIC BLOOD PRESSURE: 93 MMHG

## 2020-11-12 DIAGNOSIS — R53.83 FATIGUE, UNSPECIFIED TYPE: ICD-10-CM

## 2020-11-12 DIAGNOSIS — G35 MULTIPLE SCLEROSIS (HCC): Primary | ICD-10-CM

## 2020-11-12 DIAGNOSIS — G56.02 CARPAL TUNNEL SYNDROME OF LEFT WRIST: ICD-10-CM

## 2020-11-12 PROCEDURE — 99214 OFFICE O/P EST MOD 30 MIN: CPT | Performed by: PSYCHIATRY & NEUROLOGY

## 2020-11-12 NOTE — PROGRESS NOTES
Neurology follow-up note    Chief Complaint   Patient presents with    Follow-up    Xavi Yosef Heck is a 39 y.o. female who presented to the neurology office for management of multiple sclerosis. At the age of 29 she did have left-sided numbness that had been improving and now she does have decreased sensation in her left hand. She was diagnosed with multiple sclerosis in November 2016 as at that time she did have sudden onset right leg weakness and it gets weak and fatigued. In January she did have diplopia. All the symptoms are better now. She initially tried Copaxone for 2 weeks but had hot flashes and was feeling sick and hence was switched over to Rebif December/January 2017 and has been doing better. No recent exacerbations. Patient last MRI was in 2017. Patient is complaining of fatigue at work. Sometimes she goes to sleep while at work. Patient now has a sit and stand desk and that helps the patient. Patient did have EMG/nerve conduction study which showed left carpal tunnel syndrome    Interval history:  -The patient transitioned from Rebif to Tecfidera in September 2020. She does complain of mild bloating on Tecfidera. - Patient's left carpal tunnel syndrome symptoms have resolved. She does not have the need to use splints anymore. Current Outpatient Medications   Medication Sig    dimethyl fumarate (Tecfidera) 240 mg cpDR Take 240 mg by mouth two (2) times a day.  nitrofurantoin, macrocrystal-monohydrate, (Macrobid) 100 mg capsule Take 1 Cap by mouth two (2) times a day.  Arm Brace misc Left carpal tunnel splint to be used every night    Biotin 2,500 mcg cap Take  by mouth. Takes 10,000    norethindrone-ethinyl estradiol-iron (JUNEL FE 1.5/30, 28,) 1.5 mg-30 mcg (21)/75 mg (7) tablet Take 1 Tab by mouth daily.  LORATADINE (CLARITIN PO) Take 10 mg by mouth as needed. No current facility-administered medications for this visit. Past Medical History:   Diagnosis Date    Ill-defined condition     uterine fibroids    Multiple sclerosis (HCC)     Other ill-defined conditions(799.89)     sleep disorder and breast fibroids    Other ill-defined conditions(799.89)     left arm numbness at times    Unspecified breast disorder      Past Surgical History:   Procedure Laterality Date    ABDOMEN SURGERY PROC UNLISTED      myomectomy    HX GYN      fibroids    HX HEENT      tooth extraction    HX OTHER SURGICAL  2015    laparoscopoy      Family History   Problem Relation Age of Onset    Bleeding Prob Mother     Endometriosis Mother     Cancer Maternal Grandmother         lung    Hypertension Maternal Grandfather     Stroke Maternal Grandfather      Social History     Tobacco Use    Smoking status: Never Smoker    Smokeless tobacco: Never Used   Substance Use Topics    Alcohol use: Not Currently     Comment: occasionally    Drug use: Yes     Types: Marijuana       REVIEW OF SYSTEMS:   A ten system review of constitutional, cardiovascular, respiratory, musculoskeletal, endocrine, skin, SHEENT, genitourinary, psychiatric and neurologic systems was obtained and is unremarkable except left-sided numbness and right leg weakness    EXAMINATION:   Visit Vitals  BP (!) 110/93   Pulse 88   Ht 5' 2\" (1.575 m)   Wt 142 lb (64.4 kg)   SpO2 98%   BMI 25.97 kg/m²        General:   General appearance: Pt is in no acute distress   Distal pulses are preserved     Neurological Examination:   Mental Status: AAO x3. Speech is fluent. Follows commands, has normal fund of knowledge, attention, short term recall, comprehension and insight.      Cranial Nerves: Visual fields are full. PERRL, Extraocular movements are full. Facial sensation intact V1- V3. Facial movement intact, symmetric. Hearing intact to conversation. Palate elevates symmetrically. Shoulder shrug symmetric.  Tongue midline.      Motor: Strength is 5/5 in all 4 ext.     Sensation: Intact to pinprick sensation     Coordination/Cerebellar: Intact to finger-nose-finger      Skin: No significant bruising or lacerations. Laboratory review:   Results for orders placed or performed in visit on 09/14/20   URINALYSIS W/ RFLX MICROSCOPIC   Result Value Ref Range    Specific Gravity      >=1.030 (A) 1.005 - 1.030    pH (UA) 6.5 5.0 - 7.5    Color Yellow Yellow    Appearance Clear Clear    Leukocyte Esterase Negative Negative    Protein 1+ (A) Negative/Trace    Glucose Negative Negative    Ketone 2+ (A) Negative    Blood Negative Negative    Bilirubin Negative Negative    Urobilinogen 2.0 (H) 0.2 - 1.0 mg/dL    Nitrites Negative Negative    Microscopic Examination See additional order    MICROSCOPIC EXAMINATION   Result Value Ref Range    WBC 0-5 0 - 5 /hpf    RBC 0-2 0 - 2 /hpf    Epithelial cells 0-10 0 - 10 /hpf    Casts Present (A) None seen /lpf    Cast type Hyaline casts N/A    Mucus Present Not Estab. Bacteria None seen None seen/Few       Imaging review:  7/8/2019  MRI brain with and without contrast  Stable numerous white matter lesions with the appearance and pattern consistent with demyelinating disease. No evidence of active demyelinating plaque    MRI cervical spine with and without contrast  Numerous cord lesions again shown without enhancement. Interval inferior right posterior lateral C5, T1 and T2 hypointense lesion. Given the appearance of a new finding at C5, further evaluation with a bone scan, SPECT is recommended to determine whether these in radionucleotide avid lesions. 6/27/2019  EMG/nerve conduction study  Mild left carpal tunnel syndrome. There is no electrophysiological evidence of a left cervical radiculopathy    9/14/2017  MRI brain with and without contrast  Multifocal white matter T2 hyperintensity in pattern consistent with medical history of multiple sclerosis. No abdominal intracranial enhancement.     MRI cervical spine with and without contrast  Greater extent of cord signal alteration in the interval for the equivocal midline anterior cord enhancement at C3 level. Finding consistent with patient's diagnosis of MS. MRI thoracic spine with and without contrast  Stable extent of nonenhancing cord lesion and thoracic spine in keeping with patient's clinical diagnosis of MS.    10/4/2016  MRI thoracic spine with and without contrast  Multifocal thoracic cord signal alteration without evidence for cord enhancement. Lower cervical spine degenerative disc disease. No thoracic spine degenerative disc disease or facet arthrosis. 7/13/2016  MRI cervical spine with and without contrast  Abnormal T2 signal in the normal caliber cervical spinal cord spanning C1 through C7 and is compatible with provided diagnosis of chronic demyelinating disease. No MRI evidence of active demyelination. Documentation review:  None    Assessment/Plan:   1. Multiple sclerosis  The patient is doing well on Tecfidera that she started in September 2020 and has not had any significant side effects. We will get blood work today. 2.  Fatigue  The patient is having fatigue at work. Modafinil was not approved by insurance. She does have a sit and stand desk and that helps her. 3.  Left carpal tunnel syndrome  Symptoms are better with left carpal tunnel splint. She is not using it anymore. Follow-up in 3 months           ICD-10-CM ICD-9-CM    1. Multiple sclerosis ([facility]  G35 340    2. Fatigue, unspecified type  R53.83 780.79    3. Carpal tunnel syndrome of left wrist  G56.02 354.0         Singh Lilly MD  Neurologist    CC: Eleanor Cee MD  Fax: [phone]    This note was created using voice recognition software. Despite editing, there may be syntax errors. This note will not be viewable in [address]

## 2020-12-01 LAB
ALBUMIN SERPL-MCNC: 4.4 G/DL (ref 3.8–4.8)
ALBUMIN/GLOB SERPL: 1.5 {RATIO} (ref 1.2–2.2)
ALP SERPL-CCNC: 58 IU/L (ref 39–117)
ALT SERPL-CCNC: 12 IU/L (ref 0–32)
AST SERPL-CCNC: 14 IU/L (ref 0–40)
BASOPHILS # BLD AUTO: 0 X10E3/UL (ref 0–0.2)
BASOPHILS NFR BLD AUTO: 1 %
BILIRUB SERPL-MCNC: 0.4 MG/DL (ref 0–1.2)
BUN SERPL-MCNC: 14 MG/DL (ref 6–24)
BUN/CREAT SERPL: 20 (ref 9–23)
CALCIUM SERPL-MCNC: 9.6 MG/DL (ref 8.7–10.2)
CHLORIDE SERPL-SCNC: 104 MMOL/L (ref 96–106)
CO2 SERPL-SCNC: 26 MMOL/L (ref 20–29)
CREAT SERPL-MCNC: 0.71 MG/DL (ref 0.57–1)
EOSINOPHIL # BLD AUTO: 0.1 X10E3/UL (ref 0–0.4)
EOSINOPHIL NFR BLD AUTO: 1 %
ERYTHROCYTE [DISTWIDTH] IN BLOOD BY AUTOMATED COUNT: 12.8 % (ref 11.7–15.4)
GLOBULIN SER CALC-MCNC: 3 G/DL (ref 1.5–4.5)
GLUCOSE SERPL-MCNC: 84 MG/DL (ref 65–99)
HCT VFR BLD AUTO: 42.8 % (ref 34–46.6)
HGB BLD-MCNC: 13.7 G/DL (ref 11.1–15.9)
IMM GRANULOCYTES # BLD AUTO: 0 X10E3/UL (ref 0–0.1)
IMM GRANULOCYTES NFR BLD AUTO: 0 %
LYMPHOCYTES # BLD AUTO: 0.9 X10E3/UL (ref 0.7–3.1)
LYMPHOCYTES NFR BLD AUTO: 15 %
MCH RBC QN AUTO: 30.9 PG (ref 26.6–33)
MCHC RBC AUTO-ENTMCNC: 32 G/DL (ref 31.5–35.7)
MCV RBC AUTO: 96 FL (ref 79–97)
MONOCYTES # BLD AUTO: 0.5 X10E3/UL (ref 0.1–0.9)
MONOCYTES NFR BLD AUTO: 8 %
NEUTROPHILS # BLD AUTO: 4.3 X10E3/UL (ref 1.4–7)
NEUTROPHILS NFR BLD AUTO: 75 %
PLATELET # BLD AUTO: 309 X10E3/UL (ref 150–450)
POTASSIUM SERPL-SCNC: 4.1 MMOL/L (ref 3.5–5.2)
PROT SERPL-MCNC: 7.4 G/DL (ref 6–8.5)
RBC # BLD AUTO: 4.44 X10E6/UL (ref 3.77–5.28)
SODIUM SERPL-SCNC: 143 MMOL/L (ref 134–144)
WBC # BLD AUTO: 5.8 X10E3/UL (ref 3.4–10.8)

## 2021-01-05 ENCOUNTER — VIRTUAL VISIT (OUTPATIENT)
Dept: ENDOCRINOLOGY | Age: 46
End: 2021-01-05
Payer: COMMERCIAL

## 2021-01-05 DIAGNOSIS — E04.1 THYROID NODULE: Primary | ICD-10-CM

## 2021-01-05 PROCEDURE — 99204 OFFICE O/P NEW MOD 45 MIN: CPT | Performed by: INTERNAL MEDICINE

## 2021-01-05 NOTE — PROGRESS NOTES
**DUE TO PANDEMIC AND HEALTH CONCERNS IN THE COMMUNITY, THIS PATIENT WAS EITHER ILL OR FOUND TO BE HIGH RISK FOR IN-PERSON EVALUATION WITHIN THE CLINIC. THE FOLLOWING IS A VIRTUAL TELEMEDICINE VIDEO ENCOUNTER VIA [employer], TO WHICH THE PATIENT AGREED. THE PURPOSE IS TO LIMIT INTERRUPTIONS IN HEALTHCARE AND TO PROVIDE FOR ONGOING URGENT NEEDS UNDER THE CURRENT CONDITIONS. CONSULTATION REQUESTED BY: Flakita Lyman MD     REASON FOR CONSULT: Evaluation of thyroid nodule    CHIEF COMPLAINT: Thyroid nodule    HISTORY OF PRESENT ILLNESS:   Cornelia Garcia is a 39 y.o. female with a PMHx as noted below who was referred to our endocrinology clinic for evaluation of a thyroid nodule. Patient describes having knowledge of a thyroid nodule around 2016,   Biopsy was completed at [facility] back then and reportedly benign,  Over time she has felt it getting a bit more bulky,  She discussed this with her Dr. Lulu Mc and an US was obtained,  Family history is not significant for thyroid nodules or thyroid cancers. Patient denies any history of radiation exposure,  They deny any dysphagia or dyspnea. Patient denies symptoms of hyper or hypothyroidism. 9/10/20: Thyroid Ultrasound   Solitary right 5.0 x 2.9 x 2.3 cm thyroid nodule    Review of most recent thyroid function:  Lab Results   Component Value Date    TSH 0.989 07/08/2020    TSH 1.310 07/13/2016    FT4 1.25 07/08/2020      TSILT = Thyroid stimulating antibodies  TMCLT = TPO antibodies  T3LT = Total T3 levels    At this time they would like to further investigate the nature of the thyroid nodule.     PAST MEDICAL/SURGICAL HISTORY:   Past Medical History:   Diagnosis Date    Ill-defined condition     uterine fibroids    Multiple sclerosis (HCC)     Other ill-defined conditions(979.89)     sleep disorder and breast fibroids    Other ill-defined conditions(969.89)     left arm numbness at times    Unspecified breast disorder      Past Surgical History:   Procedure Laterality Date    ABDOMEN SURGERY PROC UNLISTED      myomectomy    HX GYN      fibroids    HX HEENT      tooth extraction    HX OTHER SURGICAL  2015    laparoscopoy        ALLERGIES:   No Known Allergies    MEDICATIONS ON ADMISSION:     Current Outpatient Medications:     dimethyl fumarate (Tecfidera) 240 mg cpDR, Take 240 mg by mouth two (2) times a day., Disp: 180 Cap, Rfl: 3    Biotin 2,500 mcg cap, Take  by mouth. Takes 10,000, Disp: , Rfl:     norethindrone-ethinyl estradiol-iron (JUNEL FE 1.5/30, 28,) 1.5 mg-30 mcg (21)/75 mg (7) tablet, Take 1 Tab by mouth daily. , Disp: , Rfl:     LORATADINE (CLARITIN PO), Take 10 mg by mouth as needed. , Disp: , Rfl:     nitrofurantoin, macrocrystal-monohydrate, (Macrobid) 100 mg capsule, Take 1 Cap by mouth two (2) times a day., Disp: 14 Cap, Rfl: 0    Arm Brace misc, Left carpal tunnel splint to be used every night, Disp: 1 Each, Rfl: 0    SOCIAL HISTORY:   Social History     Socioeconomic History    Marital status: SINGLE     Spouse name: Not on file    Number of children: Not on file    Years of education: Not on file    Highest education level: Not on file   Occupational History    Not on file   Social Needs    Financial resource strain: Not on file    Food insecurity     Worry: Not on file     Inability: Not on file    Transportation needs     Medical: Not on file     Non-medical: Not on file   Tobacco Use    Smoking status: Never Smoker    Smokeless tobacco: Never Used   Substance and Sexual Activity    Alcohol use: Not Currently     Comment: occasionally    Drug use: Yes     Types: Marijuana    Sexual activity: Not Currently     Partners: Male     Birth control/protection: Pill, Condom   Lifestyle    Physical activity     Days per week: Not on file     Minutes per session: Not on file    Stress: Not on file   Relationships    Social connections     Talks on phone: Not on file     Gets together: Not on file     Attends Taoist service: Not on file Active member of club or organization: Not on file     Attends meetings of clubs or organizations: Not on file     Relationship status: Not on file    Intimate partner violence     Fear of current or ex partner: Not on file     Emotionally abused: Not on file     Physically abused: Not on file     Forced sexual activity: Not on file   Other Topics Concern    Not on file   Social History Narrative    Not on file       FAMILY HISTORY:  Family History   Problem Relation Age of Onset    Bleeding Prob Mother     Endometriosis Mother     Cancer Maternal Grandmother         lung    Hypertension Maternal Grandfather     Stroke Maternal Grandfather        REVIEW OF SYSTEMS: Complete ROS assessed and noted for that which is described above, all else are negative. Eyes: normal  ENT: normal  CVS: normal  Resp: normal  GI: normal  : normal  GYN: normal  Endocrine: normal  Integument: normal  Musculoskeletal: normal  Neuro: normal  Psych: normal    PHYSICAL EXAMINATION:  Telemedicine Visit    GENERAL: NCAT, Appears well nourished  EYES: EOMI, non-icteric, no proptosis  Ear/Nose/Throat: NCAT, no visible inflammation or masses  CARDIOVASCULAR: no cyanosis, no visible JVD  RESPIRATORY: comfortable respirations observed, no cyanosis  MUSCULOSKELETAL: Normal ROM of upper extremities observed  SKIN: No edema, rash, or other significant changes observed  NEUROLOGIC:  AAOx3  PSYCHIATRIC: Normal affect, Normal insight and judgement      REVIEW OF LABORATORY AND RADIOLOGY DATA:   Labs and documentation have been reviewed as described above. ASSESSMENT AND PLAN:   Reginaldo Coleman is a 39 y.o. female with a PMHx as noted above who was referred to our endocrinology clinic for evaluation of a thyroid nodule. Thyroid Nodule    I have personally reviewed the images of her thyroid US and have included select images above.      Patient has a known solitary right lobe thyroid nodule that was identified in 2016 at [facility] which was biopsied back then per patient and suggested to have been benign. She has noted however some fluctuation in size per her perception. Certainly we discussed obtaining old records and she well send them to me through [place] at her convenience. We noted that the suggestively benign biopsy is reassuring, along with the lack of dysphagic symptoms, her normal thyroid levels, and the absence of alarming features on the ultrasound. We noted the options which include surgery (though not necessary at this time), re-biopsy of the nodule, vs ultrasound surveillance. She was not excited about the idea of another needle biopsy, and so we have agreed together to have her f/u in 6 months with a repeat ultrasound to assure stability. If the nodule remains stable w/o changes, we will move to 1 year surveillance US and continue to extend the intervals as we feel more reassurance. If needed however we certainly can consider another biopsy, and if it becomes symptomatic surgery is always an option if desired. Summary: 6 month f/u with thyroid US 1 week prior,    She was welcomed to reach out to me with questions or concerns. RTC June 25 at 10:30 AM,     45 minutes spent toward telemedicine visit today of which >50% of this time was spent in counseling and coordination of care. Suri Mayfield.  [address] [facility]

## 2021-05-26 ENCOUNTER — VIRTUAL VISIT (OUTPATIENT)
Dept: NEUROLOGY | Age: 46
End: 2021-05-26
Payer: COMMERCIAL

## 2021-05-26 DIAGNOSIS — G35 MULTIPLE SCLEROSIS (HCC): Primary | ICD-10-CM

## 2021-05-26 DIAGNOSIS — R53.83 FATIGUE, UNSPECIFIED TYPE: ICD-10-CM

## 2021-05-26 DIAGNOSIS — G56.02 CARPAL TUNNEL SYNDROME OF LEFT WRIST: ICD-10-CM

## 2021-05-26 PROCEDURE — 99214 OFFICE O/P EST MOD 30 MIN: CPT | Performed by: PSYCHIATRY & NEUROLOGY

## 2021-05-26 NOTE — PROGRESS NOTES
Neurology follow-up note    Chief Complaint   Patient presents with    Follow-up     MS, having night sweats       Rhonda Powell is a 39 y.o. female who presented to the neurology office for management of multiple sclerosis. At the age of 29 she did have left-sided numbness that had been improving and now she does have decreased sensation in her left hand. She was diagnosed with multiple sclerosis in November 2016 as at that time she did have sudden onset right leg weakness and it gets weak and fatigued. In January she did have diplopia. All the symptoms are better now. She initially tried Copaxone for 2 weeks but had hot flashes and was feeling sick and hence was switched over to Rebif December/January 2017 and has been doing better. No recent exacerbations. Patient last MRI was in 2017. Patient is complaining of fatigue at work. Sometimes she goes to sleep while at work. Patient now has a sit and stand desk and that helps the patient. Patient did have EMG/nerve conduction study which showed left carpal tunnel syndrome    Medications tried:  Rebif  Tecfidera    Interval history:  - No exacerbation  - Patient's left carpal tunnel syndrome symptoms have resolved. She does not have the need to use splints anymore. Current Outpatient Medications   Medication Sig    ferrous sulfate (IRON PO) Take  by mouth.  docusate sodium (STOOL SOFTENER PO) Take  by mouth.  calcium carb/vitamin D3/vit K1 (SOFT CHEWS CALCIUM PO) Take  by mouth.  dimethyl fumarate (Tecfidera) 240 mg cpDR Take 240 mg by mouth two (2) times a day.  nitrofurantoin, macrocrystal-monohydrate, (Macrobid) 100 mg capsule Take 1 Cap by mouth two (2) times a day.  Biotin 2,500 mcg cap Take  by mouth. Takes 10,000    norethindrone-ethinyl estradiol-iron (JUNEL FE 1.5/30, 28,) 1.5 mg-30 mcg (21)/75 mg (7) tablet Take 1 Tab by mouth daily.  LORATADINE (CLARITIN PO) Take 10 mg by mouth as needed.     Arm Brace misc Left carpal tunnel splint to be used every night (Patient not taking: Reported on 5/26/2021)     No current facility-administered medications for this visit. Past Medical History:   Diagnosis Date    Ill-defined condition     uterine fibroids    Multiple sclerosis (HCC)     Other ill-defined conditions(799.89)     sleep disorder and breast fibroids    Other ill-defined conditions(799.89)     left arm numbness at times    Unspecified breast disorder      Past Surgical History:   Procedure Laterality Date    HX GYN      fibroids    HX HEENT      tooth extraction    HX OTHER SURGICAL  2015    laparoscopoy     SD ABDOMEN SURGERY PROC UNLISTED      myomectomy     Family History   Problem Relation Age of Onset    Bleeding Prob Mother     Endometriosis Mother     Cancer Maternal Grandmother         lung    Hypertension Maternal Grandfather     Stroke Maternal Grandfather      Social History     Tobacco Use    Smoking status: Never Smoker    Smokeless tobacco: Never Used   Vaping Use    Vaping Use: Never used   Substance Use Topics    Alcohol use: Not Currently     Comment: occasionally    Drug use: Yes     Types: Marijuana       REVIEW OF SYSTEMS:   A ten system review of constitutional, cardiovascular, respiratory, musculoskeletal, endocrine, skin, SHEENT, genitourinary, psychiatric and neurologic systems was obtained and is unremarkable except left-sided numbness and right leg weakness    EXAMINATION:   There were no vitals taken for this visit. General:   General appearance: Pt is in no acute distress   Distal pulses are preserved     Neurological Examination:   Mental Status: AAO x3. Speech is fluent. Follows commands, has normal fund of knowledge, attention, short term recall, comprehension and insight.      Cranial Nerves: Visual fields are full. PERRL, Extraocular movements are full. Facial sensation intact V1- V3. Facial movement intact, symmetric.  Hearing intact to conversation. Palate elevates symmetrically. Shoulder shrug symmetric. Tongue midline.      Motor: Strength is 5/5 in all 4 ext.     Sensation: Intact to pinprick sensation     Coordination/Cerebellar: Intact to finger-nose-finger      Skin: No significant bruising or lacerations. Laboratory review:   Results for orders placed or performed in visit on 11/12/20   CBC WITH AUTOMATED DIFF   Result Value Ref Range    WBC 5.8 3.4 - 10.8 x10E3/uL    RBC 4.44 3.77 - 5.28 x10E6/uL    HGB 13.7 11.1 - 15.9 g/dL    HCT 42.8 34.0 - 46.6 %    MCV 96 79 - 97 fL    MCH 30.9 26.6 - 33.0 pg    MCHC 32.0 31.5 - 35.7 g/dL    RDW 12.8 11.7 - 15.4 %    PLATELET 476 311 - 041 x10E3/uL    NEUTROPHILS 75 Not Estab. %    Lymphocytes 15 Not Estab. %    MONOCYTES 8 Not Estab. %    EOSINOPHILS 1 Not Estab. %    BASOPHILS 1 Not Estab. %    ABS. NEUTROPHILS 4.3 1.4 - 7.0 x10E3/uL    Abs Lymphocytes 0.9 0.7 - 3.1 x10E3/uL    ABS. MONOCYTES 0.5 0.1 - 0.9 x10E3/uL    ABS. EOSINOPHILS 0.1 0.0 - 0.4 x10E3/uL    ABS. BASOPHILS 0.0 0.0 - 0.2 x10E3/uL    IMMATURE GRANULOCYTES 0 Not Estab. %    ABS. IMM. GRANS. 0.0 0.0 - 0.1 [id number]   METABOLIC PANEL, COMPREHENSIVE   Result Value Ref Range    Glucose 84 65 - 99 mg/dL    BUN 14 6 - 24 mg/dL    Creatinine 0.71 0.57 - 1.00 mg/dL    GFR est non- >59 mL/min/1.73    GFR est  >59 mL/min/1.73    BUN/Creatinine ratio 20 9 - 23    Sodium 143 134 - 144 mmol/L    Potassium 4.1 3.5 - 5.2 mmol/L    Chloride 104 96 - 106 mmol/L    CO2 26 20 - 29 mmol/L    Calcium 9.6 8.7 - 10.2 mg/dL    Protein, total 7.4 6.0 - 8.5 g/dL    Albumin 4.4 3.8 - 4.8 g/dL    GLOBULIN, TOTAL 3.0 1.5 - 4.5 g/dL    A-G Ratio 1.5 1.2 - 2.2    Bilirubin, total 0.4 0.0 - 1.2 mg/dL    Alk.  phosphatase 58 39 - 117 IU/L    AST (SGOT) 14 0 - 40 IU/L    ALT (SGPT) 12 0 - 32 IU/L       Imaging review:  7/8/2019  MRI brain with and without contrast  Stable numerous white matter lesions with the appearance and pattern consistent with demyelinating disease. No evidence of active demyelinating plaque    MRI cervical spine with and without contrast  Numerous cord lesions again shown without enhancement. Interval inferior right posterior lateral C5, T1 and T2 hypointense lesion. Given the appearance of a new finding at C5, further evaluation with a bone scan, SPECT is recommended to determine whether these in radionucleotide avid lesions. 6/27/2019  EMG/nerve conduction study  Mild left carpal tunnel syndrome. There is no electrophysiological evidence of a left cervical radiculopathy    9/14/2017  MRI brain with and without contrast  Multifocal white matter T2 hyperintensity in pattern consistent with medical history of multiple sclerosis. No abdominal intracranial enhancement. MRI cervical spine with and without contrast  Greater extent of cord signal alteration in the interval for the equivocal midline anterior cord enhancement at C3 level. Finding consistent with patient's diagnosis of MS. MRI thoracic spine with and without contrast  Stable extent of nonenhancing cord lesion and thoracic spine in keeping with patient's clinical diagnosis of MS.    10/4/2016  MRI thoracic spine with and without contrast  Multifocal thoracic cord signal alteration without evidence for cord enhancement. Lower cervical spine degenerative disc disease. No thoracic spine degenerative disc disease or facet arthrosis. 7/13/2016  MRI cervical spine with and without contrast  Abnormal T2 signal in the normal caliber cervical spinal cord spanning C1 through C7 and is compatible with provided diagnosis of chronic demyelinating disease. No MRI evidence of active demyelination. Documentation review:  None    Assessment/Plan:   1. Multiple sclerosis  The patient is doing well on Tecfidera that she started in September 2020 and has not had any significant side effects. 2.  Fatigue  The patient is having fatigue at work.   Modafinil was not approved by insurance. She does have a sit and stand desk and that helps her. She goes to office 1/week. 3.  Left carpal tunnel syndrome  Resolved. Follow-up in 6 months           ICD-10-CM ICD-9-CM    1. Multiple sclerosis ([address]  G35 340    2. Fatigue, unspecified type  R53.83 780.79    3. Carpal tunnel syndrome of left wrist  G56.02 354.0         Chandni Frazier MD  Neurologist    CC: Lemuel Yañez MD  Fax: [phone]    This note was created using voice recognition software. Despite editing, there may be syntax errors. This note will not be viewable in [address] Christoph Fink was seen by synchronous (real-time) audio-video technology on 05/27/21. Consent:  She and/or her healthcare decision maker is aware that this patient-initiated Telehealth encounter is a billable service, with coverage as determined by her insurance carrier. She is aware that she may receive a bill and has provided verbal consent to proceed: Yes    I was in the office while conducting this encounter. Pursuant to the emergency declaration under the [facility]5 waiver authority and the [employer] General Act, this Virtual  Visit was conducted, with patient's consent, to reduce the patient's risk of exposure to COVID-19 and provide continuity of care for an established patient. Services were provided through a video synchronous discussion virtually to substitute for in-person clinic visit.

## 2021-06-18 ENCOUNTER — HOSPITAL ENCOUNTER (OUTPATIENT)
Dept: ULTRASOUND IMAGING | Age: 46
Discharge: HOME | End: 2021-06-18
Attending: INTERNAL MEDICINE
Payer: COMMERCIAL

## 2021-06-18 DIAGNOSIS — E04.1 THYROID NODULE: ICD-10-CM

## 2021-06-18 PROCEDURE — 76536 US EXAM OF HEAD AND NECK: CPT

## 2021-07-07 NOTE — TELEPHONE ENCOUNTER
Faxed Drug Utilization Clarification Request to [phone].
Received call from [facility]. [facility]., they would like a call back.      Please use ref # [id number]    [phone]
Spoke with Meliton Garcia the representative and will check with patient which route would she like to received her medication (Rebif).
No

## 2021-07-19 DIAGNOSIS — G35 MULTIPLE SCLEROSIS (HCC): ICD-10-CM

## 2021-07-19 RX ORDER — DIMETHYL FUMARATE 240 MG/1
CAPSULE ORAL
Qty: 60 CAPSULE | Refills: 9 | Status: SHIPPED | OUTPATIENT
Start: 2021-07-19 | End: 2021-12-14

## 2021-08-27 ENCOUNTER — OFFICE VISIT (OUTPATIENT)
Dept: INTERNAL MEDICINE CLINIC | Age: 46
End: 2021-08-27
Payer: COMMERCIAL

## 2021-08-27 ENCOUNTER — HOSPITAL ENCOUNTER (OUTPATIENT)
Dept: LAB | Age: 46
Discharge: HOME | End: 2021-08-27
Payer: COMMERCIAL

## 2021-08-27 VITALS
RESPIRATION RATE: 18 BRPM | BODY MASS INDEX: 27.9 KG/M2 | OXYGEN SATURATION: 99 % | HEIGHT: 62 IN | DIASTOLIC BLOOD PRESSURE: 75 MMHG | HEART RATE: 97 BPM | WEIGHT: 151.6 LBS | SYSTOLIC BLOOD PRESSURE: 113 MMHG | TEMPERATURE: 98.8 F

## 2021-08-27 DIAGNOSIS — N85.8 UTERINE MASS: ICD-10-CM

## 2021-08-27 DIAGNOSIS — Z12.4 CERVICAL CANCER SCREENING: ICD-10-CM

## 2021-08-27 DIAGNOSIS — Z76.0 MEDICATION REFILL: ICD-10-CM

## 2021-08-27 DIAGNOSIS — Z01.419 WELL WOMAN EXAM WITH ROUTINE GYNECOLOGICAL EXAM: Primary | ICD-10-CM

## 2021-08-27 DIAGNOSIS — Z79.899 ENCOUNTER FOR LONG-TERM (CURRENT) USE OF OTHER MEDICATIONS: ICD-10-CM

## 2021-08-27 DIAGNOSIS — N76.1 SUBACUTE VAGINITIS: ICD-10-CM

## 2021-08-27 DIAGNOSIS — N63.0 BREAST MASS: ICD-10-CM

## 2021-08-27 PROCEDURE — 87625 HPV TYPES 16 & 18 ONLY: CPT

## 2021-08-27 PROCEDURE — 87624 HPV HI-RISK TYP POOLED RSLT: CPT

## 2021-08-27 PROCEDURE — S0612 ANNUAL GYNECOLOGICAL EXAMINA: HCPCS | Performed by: FAMILY MEDICINE

## 2021-08-27 PROCEDURE — 99213 OFFICE O/P EST LOW 20 MIN: CPT | Performed by: FAMILY MEDICINE

## 2021-08-27 PROCEDURE — 87591 N.GONORRHOEAE DNA AMP PROB: CPT

## 2021-08-27 PROCEDURE — 87661 TRICHOMONAS VAGINALIS AMPLIF: CPT

## 2021-08-27 PROCEDURE — 88175 CYTOPATH C/V AUTO FLUID REDO: CPT

## 2021-08-27 RX ORDER — NORETHINDRONE ACETATE AND ETHINYL ESTRADIOL 1.5-30(21)
1 KIT ORAL DAILY
Qty: 3 PACKAGE | Refills: 3 | Status: SHIPPED | OUTPATIENT
Start: 2021-08-27

## 2021-08-27 NOTE — PROGRESS NOTES
Visit Vitals  /75   Pulse 97   Temp 98.8 °F (37.1 °C) (Oral)   Resp 18   Ht 5' 2\" (1.575 m)   Wt 151 lb 9.6 oz (68.8 kg)   SpO2 99%   BMI 27.73 kg/m²     Chief Complaint   Patient presents with    Well Woman     check up     1. Have you been to the ER, urgent care clinic since your last visit? Hospitalized since your last visit? No    2. Have you seen or consulted any other health care providers outside of the [address] [place] [address] since your last visit? Include any pap smears or colon screening.  No

## 2021-08-27 NOTE — PROGRESS NOTES
SPORTS MEDICINE AND PRIMARY CARE  Slava Butcher. MD Chante  [address]  [id number]    Chief Complaint   Patient presents with    Well Woman     check up       SUBJECTIVE:    Jamshid uLis is a 55 y.o. female for well woman examination. PMH benign bilateral breast masses, multiple sclerosis, fibroid uterus. LMP : \"Saturday\"  Postmenopausal bleeding: NA  Menstrual irregularity:ocp  Abnormal pap history: no  Last pap smear: 2018  Abnormal mammogram history: no  Last mammogram date:   Colonoscopy: no  DEXA scan and T-score: no prior study  GYN/ diagnoses: fibrocystic breast, fibroid uterus  GYN/ surgery:myomectomy x2  Family hx of female cancer: unsure   Gravity/parity:    Contraception:  ocp  STI history: no  Menarche:14 yo  Sexually active: yes  Dysmenorrhea:+  Dyspareunia: no  Condoms: +  Vaginitis: no  Urinary complaints: no    Current Outpatient Medications   Medication Sig Dispense Refill    norethindrone-ethinyl estradiol-iron (Junel FE 1.5/30, 28,) 1.5 mg-30 mcg (21)/75 mg (7) tab Take 1 Tablet by mouth daily. 3 Package 3    Tecfidera 240 mg cpDR TAKE ONE CAPSULE BY MOUTH TWICE DAILY. STORE IN ORIGINAL CONTAINER AT ROOM TEMPERATURE. 60 Capsule 9    ferrous sulfate (IRON PO) Take  by mouth.  docusate sodium (STOOL SOFTENER PO) Take  by mouth.  calcium carb/vitamin D3/vit K1 (SOFT CHEWS CALCIUM PO) Take  by mouth.  Biotin 2,500 mcg cap Take  by mouth. Takes 10,000      LORATADINE (CLARITIN PO) Take 10 mg by mouth as needed.        Past Medical History:   Diagnosis Date    Ill-defined condition     uterine fibroids    Multiple sclerosis (HCC)     Other ill-defined conditions(199.89)     sleep disorder and breast fibroids    Other ill-defined conditions(749.89)     left arm numbness at times    Unspecified breast disorder      Past Surgical History:   Procedure Laterality Date    HX GYN      fibroids    HX HEENT      tooth extraction    HX OTHER SURGICAL   laparoscopoy     FL ABDOMEN SURGERY PROC UNLISTED      myomectomy     No Known Allergies    REVIEW OF SYSTEMS:   General: negative for - chills or fever  ENT: negative for - headaches, nasal congestion, tinnitus, hearing loss, vision changes, sore throat  Respiratory: negative for - cough, hemoptysis, shortness of breath or wheezing  Cardiovascular : negative for - chest pain, edema, palpitations or shortness of breath  Gastrointestinal: negative for - abdominal pain, blood in stools, heartburn or nausea/vomiting, diarrhea, constipation  Genito-Urinary: no dysuria, trouble voiding, hematuria or erectile dysfunction  Musculoskeletal: negative for - gait disturbance, joint pain, joint stiffness , joint swelling, muscle aches  Neurological: no TIA or stroke symptoms  Hematologic: no bruises, no bleeding, no swollen glands  Integument: no lumps, mole changes, nail changes or rash  Endocrine:no malaise/lethargy or unexpected weight changes      Social History     Socioeconomic History    Marital status: SINGLE     Spouse name: Not on file    Number of children: Not on file    Years of education: Not on file    Highest education level: Not on file   Tobacco Use    Smoking status: Never Smoker    Smokeless tobacco: Never Used   Vaping Use    Vaping Use: Never used   Substance and Sexual Activity    Alcohol use: Not Currently     Comment: occasionally    Drug use: Yes     Types: Marijuana    Sexual activity: Not Currently     Partners: Male     Birth control/protection: Pill, Condom     Social Determinants of Health     Financial Resource Strain:     Difficulty of Paying Living Expenses:    Food Insecurity:     Worried About Running Out of Food in the Last Year:     Ran Out of Food in the Last Year:    Transportation Needs:     Lack of Transportation (Medical):      Lack of Transportation (Non-Medical):    Physical Activity:     Days of Exercise per Week:     Minutes of Exercise per Session:    Stress:     Feeling of Stress :    Social Connections:     Frequency of Communication with Friends and Family:     Frequency of Social Gatherings with Friends and Family:     Attends Baptism Services:     Active Member of Clubs or Organizations:     Attends Club or Organization Meetings:     Marital Status:      Family History   Problem Relation Age of Onset    Bleeding Prob Mother     Endometriosis Mother     Cancer Maternal Grandmother         lung    Hypertension Maternal Grandfather     Stroke Maternal Grandfather        OBJECTIVE:     Visit Vitals  /75   Pulse 97   Temp 98.8 °F (37.1 °C) (Oral)   Resp 18   Ht 5' 2\" (1.575 m)   Wt 151 lb 9.6 oz (68.8 kg)   LMP 07/26/2021 (Exact Date)   SpO2 99%   BMI 27.73 kg/m²     General appearance: alert, cooperative, no distress, appears stated age  Head: Normocephalic, without obvious abnormality, atraumatic  Eyes: conjunctivae/corneas clear. PERRL, EOM's intact. Ears: normal TM's and external ear canals AU  Nose: Nares normal. Septum midline. Mucosa normal. No drainage or sinus tenderness. Throat: Lips, mucosa, and tongue normal. Teeth and gums normal  Neck: supple, symmetrical, trachea midline, no adenopathy, thyroid: not enlarged, symmetric, no tenderness/mass/nodules, no carotid bruit and no JVD  Back: negative, symmetric, no curvature. ROM normal. No CVA tenderness. Lungs: clear to auscultation bilaterally  Breasts: left: 5:00 firm nontender mass approx 1.5 cm in diam 2-3cm from areolar edge,                  Right: 1:00    1.5 cm firm mass, 4 -5 cm firm nontender areolar edge                            3:00   3cm diam firm nontender mass, 3 cm fom arolar edge  Heart: regular rate and rhythm, S1, S2 normal, no murmur, click, rub or gallop  Abdomen: soft, non-tender.  Bowel sounds normal. No masses,  no organomegaly  Pelvic - blood in  Vagina, uterine mass appreciated, normal external genitalia, vulva,, cervix,  and adnexa   Extremities: extremities normal, atraumatic, no cyanosis or edema  Pulses: 2+ and symmetric  Skin: Skin color, texture, turgor normal. No rashes or lesions  Lymph nodes: Cervical, supraclavicular, and axillary nodes normal.  Neurologic: Alert and oriented X 3, normal strength and tone. Normal symmetric reflexes. Normal coordination and gait    ASSESSMENT:   1. Well woman exam with routine gynecological exam    2. Cervical cancer screening    3. Breast mass -consider fibroadenoma, malignancy   4. Subacute vaginitis    5. Encounter for long-term (current) use of other medications      6. Uterine mass, suspect return of fibroid uterus    PLAN:  .  Orders Placed This Encounter    SCHUYLER 3D MIRIAM W MAMMO BI DX INCL CAD    NUSWAB VAGINITIS    norethindrone-ethinyl estradiol-iron (Junel FE 1.5/30, 28,) 1.5 mg-30 mcg (21)/75 mg (7) tab    PAP IG, CT-NG-TV, APTIMA HPV AND RFX 04/85,89([id number])           I have discussed the diagnosis with the patient and the intended plan as seen in the  orders above. The patient understands and agrees with the plan. The patient has   received an after visit summary. Questions were answered concerning  future plans  Patient labs and/or xrays were reviewed as available. Past records were reviewed as available. Counseled regarding diet, exercise and healthy lifestyle          Advised patient tocall back or return to office if symptoms develop/worsen/change/persist.  Discussed expected course/resolution/complications of diagnosis in detail with patient. Naresh Mahoney M.D. This note was created using voice recognition software.   Edits have been made but syntax errors might exist.

## 2021-08-31 LAB
A VAGINAE DNA VAG QL NAA+PROBE: NORMAL SCORE
BVAB2 DNA VAG QL NAA+PROBE: NORMAL SCORE
C ALBICANS DNA VAG QL NAA+PROBE: NEGATIVE
C GLABRATA DNA VAG QL NAA+PROBE: NEGATIVE
MEGA1 DNA VAG QL NAA+PROBE: NORMAL SCORE
SPECIMEN SOURCE: NORMAL
T VAGINALIS RRNA SPEC QL NAA+PROBE: NEGATIVE

## 2021-09-04 LAB
SPECIMEN SOURCE: NORMAL
T VAGINALIS RRNA SPEC QL NAA+PROBE: NEGATIVE

## 2021-10-10 ENCOUNTER — HOSPITAL ENCOUNTER (EMERGENCY)
Age: 46
Discharge: HOME | End: 2021-10-10
Attending: EMERGENCY MEDICINE
Payer: COMMERCIAL

## 2021-10-10 VITALS
OXYGEN SATURATION: 100 % | HEART RATE: 93 BPM | WEIGHT: 147.71 LBS | HEIGHT: 62 IN | RESPIRATION RATE: 18 BRPM | TEMPERATURE: 98.7 F | BODY MASS INDEX: 27.18 KG/M2 | DIASTOLIC BLOOD PRESSURE: 76 MMHG | SYSTOLIC BLOOD PRESSURE: 131 MMHG

## 2021-10-10 DIAGNOSIS — G35 MULTIPLE SCLEROSIS EXACERBATION (HCC): Primary | ICD-10-CM

## 2021-10-10 LAB
ALBUMIN SERPL-MCNC: 4.1 G/DL (ref 3.5–5)
ALBUMIN/GLOB SERPL: 0.9 {RATIO} (ref 1.1–2.2)
ALP SERPL-CCNC: 64 U/L (ref 45–117)
ALT SERPL-CCNC: 20 U/L (ref 12–78)
ANION GAP SERPL CALC-SCNC: 2 MMOL/L (ref 5–15)
APPEARANCE UR: CLEAR
AST SERPL-CCNC: 12 U/L (ref 15–37)
ATRIAL RATE: 92 BPM
BACTERIA URNS QL MICRO: NEGATIVE /HPF
BASOPHILS # BLD: 0 K/UL (ref 0–0.1)
BASOPHILS NFR BLD: 1 % (ref 0–1)
BILIRUB SERPL-MCNC: 0.5 MG/DL (ref 0.2–1)
BILIRUB UR QL: NEGATIVE
BUN SERPL-MCNC: 13 MG/DL (ref 6–20)
BUN/CREAT SERPL: 22 (ref 12–20)
CALCIUM SERPL-MCNC: 9.3 MG/DL (ref 8.5–10.1)
CALCULATED P AXIS, ECG09: 63 DEGREES
CALCULATED R AXIS, ECG10: 75 DEGREES
CALCULATED T AXIS, ECG11: 53 DEGREES
CHLORIDE SERPL-SCNC: 105 MMOL/L (ref 97–108)
CO2 SERPL-SCNC: 31 MMOL/L (ref 21–32)
COLOR UR: NORMAL
CREAT SERPL-MCNC: 0.59 MG/DL (ref 0.55–1.02)
DIAGNOSIS, 93000: NORMAL
DIFFERENTIAL METHOD BLD: NORMAL
EOSINOPHIL # BLD: 0.1 K/UL (ref 0–0.4)
EOSINOPHIL NFR BLD: 2 % (ref 0–7)
EPITH CASTS URNS QL MICRO: NORMAL /LPF
ERYTHROCYTE [DISTWIDTH] IN BLOOD BY AUTOMATED COUNT: 12.6 % (ref 11.5–14.5)
GLOBULIN SER CALC-MCNC: 4.6 G/DL (ref 2–4)
GLUCOSE SERPL-MCNC: 106 MG/DL (ref 65–100)
GLUCOSE UR STRIP.AUTO-MCNC: NEGATIVE MG/DL
HCT VFR BLD AUTO: 42.1 % (ref 35–47)
HGB BLD-MCNC: 14.4 G/DL (ref 11.5–16)
HGB UR QL STRIP: NEGATIVE
HYALINE CASTS URNS QL MICRO: NORMAL /LPF (ref 0–5)
IMM GRANULOCYTES # BLD AUTO: 0 K/UL (ref 0–0.04)
IMM GRANULOCYTES NFR BLD AUTO: 0 % (ref 0–0.5)
KETONES UR QL STRIP.AUTO: NEGATIVE MG/DL
LEUKOCYTE ESTERASE UR QL STRIP.AUTO: NEGATIVE
LYMPHOCYTES # BLD: 0.9 K/UL (ref 0.8–3.5)
LYMPHOCYTES NFR BLD: 21 % (ref 12–49)
MAGNESIUM SERPL-MCNC: 2.1 MG/DL (ref 1.6–2.4)
MCH RBC QN AUTO: 32 PG (ref 26–34)
MCHC RBC AUTO-ENTMCNC: 34.2 G/DL (ref 30–36.5)
MCV RBC AUTO: 93.6 FL (ref 80–99)
MONOCYTES # BLD: 0.4 K/UL (ref 0–1)
MONOCYTES NFR BLD: 10 % (ref 5–13)
NEUTS SEG # BLD: 2.7 K/UL (ref 1.8–8)
NEUTS SEG NFR BLD: 66 % (ref 32–75)
NITRITE UR QL STRIP.AUTO: NEGATIVE
NRBC # BLD: 0 K/UL (ref 0–0.01)
NRBC BLD-RTO: 0 PER 100 WBC
P-R INTERVAL, ECG05: 164 MS
PH UR STRIP: 7 [PH] (ref 5–8)
PLATELET # BLD AUTO: 257 K/UL (ref 150–400)
PMV BLD AUTO: 10.9 FL (ref 8.9–12.9)
POTASSIUM SERPL-SCNC: 3.6 MMOL/L (ref 3.5–5.1)
PROT SERPL-MCNC: 8.7 G/DL (ref 6.4–8.2)
PROT UR STRIP-MCNC: NEGATIVE MG/DL
Q-T INTERVAL, ECG07: 356 MS
QRS DURATION, ECG06: 84 MS
QTC CALCULATION (BEZET), ECG08: 410 MS
RBC # BLD AUTO: 4.5 M/UL (ref 3.8–5.2)
RBC #/AREA URNS HPF: NORMAL /HPF (ref 0–5)
SODIUM SERPL-SCNC: 138 MMOL/L (ref 136–145)
SP GR UR REFRACTOMETRY: 1.02 (ref 1–1.03)
TROPONIN-HIGH SENSITIVITY: <4 NG/L (ref 0–51)
UA: UC IF INDICATED,UAUC: NORMAL
UROBILINOGEN UR QL STRIP.AUTO: 1 EU/DL (ref 0.2–1)
VENTRICULAR RATE, ECG03: 80 BPM
WBC # BLD AUTO: 4.1 K/UL (ref 3.6–11)
WBC URNS QL MICRO: NORMAL /HPF (ref 0–4)

## 2021-10-10 PROCEDURE — 81001 URINALYSIS AUTO W/SCOPE: CPT

## 2021-10-10 PROCEDURE — 94762 N-INVAS EAR/PLS OXIMTRY CONT: CPT

## 2021-10-10 PROCEDURE — 99284 EMERGENCY DEPT VISIT MOD MDM: CPT

## 2021-10-10 PROCEDURE — 74011250636 HC RX REV CODE- 250/636: Performed by: EMERGENCY MEDICINE

## 2021-10-10 PROCEDURE — 83735 ASSAY OF MAGNESIUM: CPT

## 2021-10-10 PROCEDURE — 85025 COMPLETE CBC W/AUTO DIFF WBC: CPT

## 2021-10-10 PROCEDURE — 93005 ELECTROCARDIOGRAM TRACING: CPT

## 2021-10-10 PROCEDURE — 74011000258 HC RX REV CODE- 258: Performed by: EMERGENCY MEDICINE

## 2021-10-10 PROCEDURE — 96365 THER/PROPH/DIAG IV INF INIT: CPT

## 2021-10-10 PROCEDURE — 36415 COLL VENOUS BLD VENIPUNCTURE: CPT

## 2021-10-10 PROCEDURE — 80053 COMPREHEN METABOLIC PANEL: CPT

## 2021-10-10 PROCEDURE — 84484 ASSAY OF TROPONIN QUANT: CPT

## 2021-10-10 RX ORDER — PREDNISONE 10 MG/1
TABLET ORAL
Qty: 48 TABLET | Refills: 0 | Status: SHIPPED | OUTPATIENT
Start: 2021-10-10 | End: 2021-11-30 | Stop reason: ALTCHOICE

## 2021-10-10 RX ORDER — PREDNISONE 10 MG/1
TABLET ORAL
Qty: 48 TABLET | Refills: 0 | Status: SHIPPED | OUTPATIENT
Start: 2021-10-10 | End: 2021-10-10 | Stop reason: SDUPTHER

## 2021-10-10 RX ADMIN — SODIUM CHLORIDE 1000 MG: 900 INJECTION INTRAVENOUS at 08:36

## 2021-10-10 NOTE — ED NOTES
Assumed care of pt from triage. Pt is ambulatory into room and placed on the monitor x 2. Pt reporting R shoulder aching/throbbing that radiates down her arm and up R side of neck. Pt has hx of MS Haley MONTOYA at bedside     1000 Pt discharged by Dr Rene Amanda. Pt provided with discharge instructions Rx and instructions on follow up care.  Pt ambulatory out of ED

## 2021-10-10 NOTE — ED PROVIDER NOTES
EMERGENCY DEPARTMENT HISTORY AND PHYSICAL EXAM      Date: 10/10/2021  Patient Name: Khadra Siu    History of Presenting Illness     Chief Complaint   Patient presents with    Arm Pain     ED visit d/t (R) arm pain and weakness - onset of sxs, Monday 10/4/10 - persisetent sxs - hx of MS with active treatment - Denies fevers / N / V / D / change in mental status / change in gait / sick contacts;;         History Provided By: Patient    HPI: Khadra Siu, 55 y.o. female presents to the ED with cc of right arm weakness. Pt has hx of MS. She was referred by Neuro for evaluation and a dose of steroids. She states her symptoms started on Monday and has not improved. Severity is moderate and she can move the extremity with mild weakness. No alleviating or exacerbating factors. She denies head and neck pain. She denies back pain. She denies recent fever/chills. She denies CP/SOA, abd pain. She denies n/v/d. There has been no urinary symptoms. No incontinence of bowel or bladder. There are no other complaints, changes, or physical findings at this time. PCP: Saskia La MD    No current facility-administered medications on file prior to encounter. Current Outpatient Medications on File Prior to Encounter   Medication Sig Dispense Refill    norethindrone-ethinyl estradiol-iron (Junel FE 1.5/30, 28,) 1.5 mg-30 mcg (21)/75 mg (7) tab Take 1 Tablet by mouth daily. 3 Package 3    Tecfidera 240 mg cpDR TAKE ONE CAPSULE BY MOUTH TWICE DAILY. STORE IN ORIGINAL CONTAINER AT ROOM TEMPERATURE. 60 Capsule 9    ferrous sulfate (IRON PO) Take  by mouth.  docusate sodium (STOOL SOFTENER PO) Take  by mouth.  calcium carb/vitamin D3/vit K1 (SOFT CHEWS CALCIUM PO) Take  by mouth.  Biotin 2,500 mcg cap Take  by mouth. Takes 10,000      LORATADINE (CLARITIN PO) Take 10 mg by mouth as needed.          Past History     Past Medical History:  Past Medical History:   Diagnosis Date    Ill-defined condition uterine fibroids    Multiple sclerosis (HCC)     Other ill-defined conditions(799.89)     sleep disorder and breast fibroids    Other ill-defined conditions(799.89)     left arm numbness at times    Unspecified breast disorder        Past Surgical History:  Past Surgical History:   Procedure Laterality Date    HX GYN      fibroids    HX HEENT      tooth extraction    HX OTHER SURGICAL  2015    laparoscopoy     CA ABDOMEN SURGERY PROC UNLISTED      myomectomy       Family History:  Family History   Problem Relation Age of Onset    Bleeding Prob Mother     Endometriosis Mother     Cancer Maternal Grandmother         lung    Hypertension Maternal Grandfather     Stroke Maternal Grandfather        Social History:  Social History     Tobacco Use    Smoking status: Never Smoker    Smokeless tobacco: Never Used   Vaping Use    Vaping Use: Never used   Substance Use Topics    Alcohol use: Not Currently     Comment: occasionally    Drug use: Yes     Types: Marijuana       Allergies:  No Known Allergies      Review of Systems   Review of Systems   Constitutional: Negative. Negative for appetite change, chills, fatigue and fever. HENT: Negative. Negative for congestion, rhinorrhea, sinus pressure and sore throat. Eyes: Negative. Respiratory: Negative. Negative for cough, choking, chest tightness, shortness of breath and wheezing. Cardiovascular: Negative. Negative for chest pain, palpitations and leg swelling. Gastrointestinal: Negative for abdominal pain, constipation, diarrhea, nausea and vomiting. Endocrine: Negative. Genitourinary: Negative. Negative for difficulty urinating, dysuria, flank pain and urgency. Musculoskeletal: Negative. Skin: Negative. Neurological: Positive for weakness (RUE). Negative for dizziness, speech difficulty, light-headedness, numbness and headaches. Hx of MS   Psychiatric/Behavioral: Negative.     All other systems reviewed and are negative. Physical Exam   Physical Exam  Vitals and nursing note reviewed. Constitutional:       General: She is not in acute distress. Appearance: Normal appearance. She is well-developed. She is not diaphoretic. HENT:      Head: Normocephalic and atraumatic. Mouth/Throat:      Mouth: Mucous membranes are moist.      Pharynx: No oropharyngeal exudate. Eyes:      Extraocular Movements: Extraocular movements intact. Conjunctiva/sclera: Conjunctivae normal.      Pupils: Pupils are equal, round, and reactive to light. Neck:      Vascular: No JVD. Trachea: No tracheal deviation. Cardiovascular:      Rate and Rhythm: Normal rate and regular rhythm. Heart sounds: Normal heart sounds. No murmur heard. Pulmonary:      Effort: Pulmonary effort is normal. No respiratory distress. Breath sounds: Normal breath sounds. No stridor. No wheezing or rales. Chest:      Chest wall: No tenderness. Abdominal:      General: There is no distension. Palpations: Abdomen is soft. Tenderness: There is no abdominal tenderness. There is no guarding or rebound. Musculoskeletal:         General: No tenderness. Normal range of motion. Cervical back: Normal range of motion and neck supple. Skin:     General: Skin is warm and dry. Capillary Refill: Capillary refill takes less than 2 seconds. Neurological:      Mental Status: She is alert and oriented to person, place, and time. Cranial Nerves: No cranial nerve deficit.       Comments: No facial symmetry, no speech difficulty, there is no sensory or weakness in LUE and aggie Lower Ext's, there is mild weakness in right arm    Psychiatric:         Mood and Affect: Mood normal.         Behavior: Behavior normal.         Diagnostic Study Results     Labs -     Recent Results (from the past 12 hour(s))   EKG, 12 LEAD, INITIAL    Collection Time: 10/10/21  6:57 AM   Result Value Ref Range    Ventricular Rate 80 BPM    Atrial Rate 92 BPM    P-R Interval 164 ms    QRS Duration 84 ms    Q-T Interval 356 ms    QTC Calculation (Bezet) 410 ms    Calculated P Axis 63 degrees    Calculated R Axis 75 degrees    Calculated T Axis 53 degrees    Diagnosis       Sinus rhythm with marked sinus arrhythmia  No previous ECGs available       Recent Results (from the past 24 hour(s))   EKG, 12 LEAD, INITIAL    Collection Time: 10/10/21  6:57 AM   Result Value Ref Range    Ventricular Rate 80 BPM    Atrial Rate 92 BPM    P-R Interval 164 ms    QRS Duration 84 ms    Q-T Interval 356 ms    QTC Calculation (Bezet) 410 ms    Calculated P Axis 63 degrees    Calculated R Axis 75 degrees    Calculated T Axis 53 degrees    Diagnosis       Sinus rhythm with marked sinus arrhythmia  No previous ECGs available  Confirmed by Mckay Rivera PEDMUNDO ([id number]) on 10/10/2021 11:03:29 PM     MAGNESIUM    Collection Time: 10/10/21  7:23 AM   Result Value Ref Range    Magnesium 2.1 1.6 - 2.4 mg/dL   CBC WITH AUTOMATED DIFF    Collection Time: 10/10/21  7:23 AM   Result Value Ref Range    WBC 4.1 3.6 - 11.0 K/uL    RBC 4.50 3.80 - 5.20 M/uL    HGB 14.4 11.5 - 16.0 g/dL    HCT 42.1 35.0 - 47.0 %    MCV 93.6 80.0 - 99.0 FL    MCH 32.0 26.0 - 34.0 PG    MCHC 34.2 30.0 - 36.5 g/dL    RDW 12.6 11.5 - 14.5 %    PLATELET 288 253 - 421 K/uL    MPV 10.9 8.9 - 12.9 FL    NRBC 0.0 0  WBC    ABSOLUTE NRBC 0.00 0.00 - 0.01 K/uL    NEUTROPHILS 66 32 - 75 %    LYMPHOCYTES 21 12 - 49 %    MONOCYTES 10 5 - 13 %    EOSINOPHILS 2 0 - 7 %    BASOPHILS 1 0 - 1 %    IMMATURE GRANULOCYTES 0 0.0 - 0.5 %    ABS. NEUTROPHILS 2.7 1.8 - 8.0 K/UL    ABS. LYMPHOCYTES 0.9 0.8 - 3.5 K/UL    ABS. MONOCYTES 0.4 0.0 - 1.0 K/UL    ABS. EOSINOPHILS 0.1 0.0 - 0.4 K/UL    ABS. BASOPHILS 0.0 0.0 - 0.1 K/UL    ABS. IMM.  GRANS. 0.0 0.00 - 0.04 K/UL    DF AUTOMATED     METABOLIC PANEL, COMPREHENSIVE    Collection Time: 10/10/21  7:23 AM   Result Value Ref Range    Sodium 138 136 - 145 mmol/L    Potassium 3.6 3.5 - 5.1 mmol/L    Chloride 105 97 - 108 mmol/L    CO2 31 21 - 32 mmol/L    Anion gap 2 (L) 5 - 15 mmol/L    Glucose 106 (H) 65 - 100 mg/dL    BUN 13 6 - 20 MG/DL    Creatinine 0.59 0.55 - 1.02 MG/DL    BUN/Creatinine ratio 22 (H) 12 - 20      GFR est AA >60 >60 ml/min/1.73m2    GFR est non-AA >60 >60 ml/min/1.73m2    Calcium 9.3 8.5 - 10.1 MG/DL    Bilirubin, total 0.5 0.2 - 1.0 MG/DL    ALT (SGPT) 20 12 - 78 U/L    AST (SGOT) 12 (L) 15 - 37 U/L    Alk. phosphatase 64 45 - 117 U/L    Protein, total 8.7 (H) 6.4 - 8.2 g/dL    Albumin 4.1 3.5 - 5.0 g/dL    Globulin 4.6 (H) 2.0 - 4.0 g/dL    A-G Ratio 0.9 (L) 1.1 - 2.2     TROPONIN-HIGH SENSITIVITY    Collection Time: 10/10/21  7:23 AM   Result Value Ref Range    Troponin-High Sensitivity <4 0 - 51 ng/L   URINALYSIS W/ REFLEX CULTURE    Collection Time: 10/10/21  8:13 AM    Specimen: Urine   Result Value Ref Range    Color YELLOW/STRAW      Appearance CLEAR CLEAR      Specific gravity 1.016 1.003 - 1.030      pH (UA) 7.0 5.0 - 8.0      Protein Negative NEG mg/dL    Glucose Negative NEG mg/dL    Ketone Negative NEG mg/dL    Bilirubin Negative NEG      Blood Negative NEG      Urobilinogen 1.0 0.2 - 1.0 EU/dL    Nitrites Negative NEG      Leukocyte Esterase Negative NEG      WBC 0-4 0 - 4 /hpf    RBC 0-5 0 - 5 /hpf    Epithelial cells FEW FEW /lpf    Bacteria Negative NEG /hpf    UA:UC IF INDICATED CULTURE NOT INDICATED BY UA RESULT CNI      Hyaline cast 0-2 0 - 5 /lpf       Radiologic Studies -   No orders to display     CT Results  (Last 48 hours)    None        CXR Results  (Last 48 hours)    None          Medical Decision Making   I am the first provider for this patient. I reviewed the vital signs, available nursing notes, past medical history, past surgical history, family history and social history. Vital Signs-Reviewed the patient's vital signs.   Patient Vitals for the past 12 hrs:   Temp Pulse Resp BP SpO2   10/10/21 0652 98.7 °F (37.1 °C) 93 18 (!) 143/87 100 % EKG interpretation: (Preliminary)  Sinus, rate 80, normal axis/pr/qrs. Jo Wasserman DO    Records Reviewed: Nursing Notes, Old Medical Records, Previous Radiology Studies and Previous Laboratory Studies, followed by [facility], ;last office visit 8/27    Provider Notes (Medical Decision Making):   DDx- MS exacerbation, electrolyte abnormality, UTI       ED Course:   Initial assessment performed. The patients presenting problems have been discussed, and they are in agreement with the care plan formulated and outlined with them. I have encouraged them to ask questions as they arise throughout their visit. Pt states her arm issues is exactly like last episode except las event she had leg weakness too. Pt given dose of IV Solumedrol in ED. Pt states feeling better. Will dc home, f/u with Neuro to discuss if need for IV steroids through [facility]. Disposition:  DC home     DISCHARGE PLAN:  1. Discharge Medication List as of 10/10/2021  9:49 AM      START taking these medications    Details   predniSONE (STERAPRED DS) 10 mg dose pack Take as directed, Print, Disp-48 Tablet, R-0         CONTINUE these medications which have NOT CHANGED    Details   norethindrone-ethinyl estradiol-iron (Junel FE 1.5/30, 28,) 1.5 mg-30 mcg (21)/75 mg (7) tab Take 1 Tablet by mouth daily. , Normal, Disp-3 Package, R-3      Tecfidera 240 mg cpDR TAKE ONE CAPSULE BY MOUTH TWICE DAILY. STORE IN ORIGINAL CONTAINER AT ROOM TEMPERATURE., Normal, Disp-60 Capsule, R-9, PANDA      ferrous sulfate (IRON PO) Take  by mouth., Historical Med      docusate sodium (STOOL SOFTENER PO) Take  by mouth., Historical Med      calcium carb/vitamin D3/vit K1 (SOFT CHEWS CALCIUM PO) Take  by mouth., Historical Med      Biotin 2,500 mcg cap Take  by mouth. Takes 10,000, Historical Med      LORATADINE (CLARITIN PO) Take 10 mg by mouth as needed., Historical Med           2.    Follow-up Information     Follow up With Specialties Details Why Contact Info Kisha Omalley MD Family Medicine   [facility]  [address] [facility]      Michelle Manjarrez NP Neurology   [address] [facility]  [address] [facility]  [place]  [phone]          3. Return to ED if worse     Diagnosis     Clinical Impression:   1. Multiple sclerosis exacerbation ([address]        Attestations:    Laurel Dang, DO    Please note that this dictation was completed with [employer], the computer voice recognition software. Quite often unanticipated grammatical, syntax, homophones, and other interpretive errors are inadvertently transcribed by the computer software. Please disregard these errors. Please excuse any errors that have escaped final proofreading. Thank you.

## 2021-11-03 ENCOUNTER — PATIENT MESSAGE (OUTPATIENT)
Dept: NEUROLOGY | Age: 46
End: 2021-11-03

## 2021-11-03 DIAGNOSIS — G35 MULTIPLE SCLEROSIS (HCC): Primary | ICD-10-CM

## 2021-11-03 NOTE — TELEPHONE ENCOUNTER
Patient with probable MS exacerbation additionally she is due for routine neuroradiology surveillance scans for her MS full neuraxis has been ordered

## 2021-11-03 NOTE — TELEPHONE ENCOUNTER
Sarah John LPN 25/2/8471 89:79 AM EDT      ----- Message -----  From: Mira Covert: 11/3/2021 10:57 AM EDT  To: [facility] Nurse Pool  Subject: Non-Urgent Medical Question     I went to the emergency room like Dr. Bravo Perez advised and got the injection. Now, my right arm aches when it gets cold and sometimes when it doesn't. I don't know if it's the 2 bumps that showed up on my arm this year (see pic) but they ache as well as my shoulder and arm. I'll also let my primary know in case this has nothing to do with my MS.

## 2021-11-17 ENCOUNTER — HOSPITAL ENCOUNTER (EMERGENCY)
Age: 46
Discharge: HOME | End: 2021-11-17
Attending: EMERGENCY MEDICINE
Payer: COMMERCIAL

## 2021-11-17 ENCOUNTER — APPOINTMENT (OUTPATIENT)
Dept: GENERAL RADIOLOGY | Age: 46
End: 2021-11-17
Attending: PHYSICIAN ASSISTANT
Payer: COMMERCIAL

## 2021-11-17 VITALS
SYSTOLIC BLOOD PRESSURE: 139 MMHG | HEART RATE: 101 BPM | BODY MASS INDEX: 27.55 KG/M2 | WEIGHT: 149.69 LBS | RESPIRATION RATE: 16 BRPM | OXYGEN SATURATION: 100 % | DIASTOLIC BLOOD PRESSURE: 97 MMHG | TEMPERATURE: 98.7 F | HEIGHT: 62 IN

## 2021-11-17 DIAGNOSIS — M50.30 DEGENERATIVE DISC DISEASE, CERVICAL: ICD-10-CM

## 2021-11-17 DIAGNOSIS — M54.12 CERVICAL RADICULOPATHY: Primary | ICD-10-CM

## 2021-11-17 PROCEDURE — 72050 X-RAY EXAM NECK SPINE 4/5VWS: CPT

## 2021-11-17 PROCEDURE — 99283 EMERGENCY DEPT VISIT LOW MDM: CPT

## 2021-11-17 PROCEDURE — 74011250637 HC RX REV CODE- 250/637: Performed by: PHYSICIAN ASSISTANT

## 2021-11-17 PROCEDURE — 74011636637 HC RX REV CODE- 636/637: Performed by: PHYSICIAN ASSISTANT

## 2021-11-17 RX ORDER — PREDNISONE 20 MG/1
60 TABLET ORAL
Status: COMPLETED | OUTPATIENT
Start: 2021-11-17 | End: 2021-11-17

## 2021-11-17 RX ORDER — OXYCODONE AND ACETAMINOPHEN 5; 325 MG/1; MG/1
1 TABLET ORAL
Qty: 10 TABLET | Refills: 0 | Status: SHIPPED | OUTPATIENT
Start: 2021-11-17 | End: 2021-11-20

## 2021-11-17 RX ORDER — METHOCARBAMOL 750 MG/1
750 TABLET, FILM COATED ORAL 4 TIMES DAILY
Qty: 20 TABLET | Refills: 0 | Status: SHIPPED | OUTPATIENT
Start: 2021-11-17 | End: 2021-11-30 | Stop reason: ALTCHOICE

## 2021-11-17 RX ORDER — OXYCODONE AND ACETAMINOPHEN 5; 325 MG/1; MG/1
1 TABLET ORAL
Status: COMPLETED | OUTPATIENT
Start: 2021-11-17 | End: 2021-11-17

## 2021-11-17 RX ORDER — PREDNISONE 10 MG/1
TABLET ORAL
Qty: 21 TABLET | Refills: 0 | Status: SHIPPED | OUTPATIENT
Start: 2021-11-18 | End: 2021-11-30 | Stop reason: ALTCHOICE

## 2021-11-17 RX ADMIN — OXYCODONE AND ACETAMINOPHEN 1 TABLET: 5; 325 TABLET ORAL at 08:39

## 2021-11-17 RX ADMIN — PREDNISONE 60 MG: 20 TABLET ORAL at 08:39

## 2021-11-17 NOTE — ED PROVIDER NOTES
EMERGENCY DEPARTMENT HISTORY AND PHYSICAL EXAM      Date: 11/17/2021  Patient Name: Pedro Ahuja    History of Presenting Illness     Chief Complaint   Patient presents with    Arm Pain     Pain at right arm for 1 month, denies injury. Steroid infusion for MS 1 month ago, throbbing pain persists. Unrelieved with naproxen at home. History Provided By: Patient    HPI: Pedro Ahuja, 55 y.o. female with PMHx significant for MS, presents to the ED with cc of right arm pain for 1 month. She describes it as an aching, throbbing pain that radiates from the right side of her neck down her right arm. Pain is worse with movement. She tried taking naproxen this morning without relief. She thinks that she may have a pinched nerve or arthritis that is causing this pain. Of note, she was seen here a little over a month ago for an MS flare but the patient reports that this pain feels different to her normal MS flare and she is not having any weakness or numbness at this time. She denies known injury. There are no other complaints, changes, or physical findings at this time. PCP: Raghav Ashby MD    No current facility-administered medications on file prior to encounter. Current Outpatient Medications on File Prior to Encounter   Medication Sig Dispense Refill    predniSONE (STERAPRED DS) 10 mg dose pack Take as directed 48 Tablet 0    norethindrone-ethinyl estradiol-iron (Junel FE 1.5/30, 28,) 1.5 mg-30 mcg (21)/75 mg (7) tab Take 1 Tablet by mouth daily. 3 Package 3    Tecfidera 240 mg cpDR TAKE ONE CAPSULE BY MOUTH TWICE DAILY. STORE IN ORIGINAL CONTAINER AT ROOM TEMPERATURE. 60 Capsule 9    ferrous sulfate (IRON PO) Take  by mouth.  docusate sodium (STOOL SOFTENER PO) Take  by mouth.  calcium carb/vitamin D3/vit K1 (SOFT CHEWS CALCIUM PO) Take  by mouth. (Patient not taking: Reported on 10/10/2021)      Biotin 2,500 mcg cap Take  by mouth.  Takes 10,000      LORATADINE (CLARITIN PO) Take 10 mg by mouth as needed. (Patient not taking: Reported on 10/10/2021)         Past History     Past Medical History:  Past Medical History:   Diagnosis Date    Ill-defined condition     uterine fibroids    Multiple sclerosis (HCC)     Other ill-defined conditions(799.89)     sleep disorder and breast fibroids    Other ill-defined conditions(799.89)     left arm numbness at times    Unspecified breast disorder        Past Surgical History:  Past Surgical History:   Procedure Laterality Date    HX GYN      fibroids    HX HEENT      tooth extraction    HX OTHER SURGICAL  2015    laparoscopoy     MD ABDOMEN SURGERY PROC UNLISTED      myomectomy       Family History:  Family History   Problem Relation Age of Onset    Bleeding Prob Mother     Endometriosis Mother     Cancer Maternal Grandmother         lung    Hypertension Maternal Grandfather     Stroke Maternal Grandfather        Social History:  Social History     Tobacco Use    Smoking status: Passive Smoke Exposure - Never Smoker    Smokeless tobacco: Never Used   Vaping Use    Vaping Use: Never used   Substance Use Topics    Alcohol use: Not Currently     Comment: occasionally    Drug use: Yes     Types: Marijuana       Allergies:  No Known Allergies      Review of Systems   Review of Systems   Constitutional: Negative for chills and fever. HENT: Negative for ear pain and sore throat. Eyes: Negative for redness and visual disturbance. Respiratory: Negative for cough and shortness of breath. Cardiovascular: Negative for chest pain and palpitations. Gastrointestinal: Negative for abdominal pain, nausea and vomiting. Genitourinary: Negative for dysuria and hematuria. Musculoskeletal: Positive for neck pain. Negative for back pain and gait problem. +right arm pain   Skin: Negative for rash and wound. Neurological: Negative for dizziness, weakness, numbness and headaches.    Psychiatric/Behavioral: Negative for behavioral problems and confusion. All other systems reviewed and are negative. Physical Exam   Physical Exam  Constitutional:       Appearance: She is not toxic-appearing. Comments: Interactive female in no distress. HENT:      Head: Normocephalic and atraumatic. Mouth/Throat:      Mouth: Mucous membranes are moist.   Eyes:      Extraocular Movements: Extraocular movements intact. Pupils: Pupils are equal, round, and reactive to light. Cardiovascular:      Rate and Rhythm: Normal rate and regular rhythm. Pulmonary:      Effort: Pulmonary effort is normal. No respiratory distress. Musculoskeletal:         General: No deformity. Normal range of motion. Cervical back: Normal range of motion and neck supple. Comments: Tenderness to palpation of the right cervical paraspinal muscles. No midline tenderness. Pain increased with range of motion of right shoulder. No reproducible tenderness of right arm. No erythema or edema of the arm. 2+ radial pulse. Skin:     General: Skin is warm and dry. Neurological:      General: No focal deficit present. Mental Status: She is alert and oriented to person, place, and time. Comments: Strength 5/5 in bilateral upper extremities with  strength, elbow flexion, and elbow extension. Distal sensation intact bilaterally. Psychiatric:         Behavior: Behavior normal.           Diagnostic Study Results     Labs -   No results found for this or any previous visit (from the past 12 hour(s)). Radiologic Studies -   XR SPINE CERV 4 OR 5 V   Final Result   Moderate narrowing of the C5-C6 and C6-C7 intervertebral discs. CT Results  (Last 48 hours)    None        CXR Results  (Last 48 hours)    None            Medical Decision Making   I am the first provider for this patient. I reviewed the vital signs, available nursing notes, past medical history, past surgical history, family history and social history.     Vital Signs-Reviewed the patient's vital signs. Patient Vitals for the past 12 hrs:   Temp Pulse Resp BP SpO2   11/17/21 0805 98.7 °F (37.1 °C) (!) 101 16 (!) 139/97 100 %         Records Reviewed: Nursing Notes and Old Medical Records      Provider Notes (Medical Decision Making):   DDx: cervical radiculopathy, osteoarthritis, muscle strain, nerve impingement    Patient presents with right arm pain. No neurologic deficits or paresthesias and feels different to prior MS flares, do not suspect acute MS flare today. History and physical exam more suggestive of cervical radiculopathy. C-spine x-ray shows degenerative disc disease. Patient treated with pain medication and felt much better on recheck. Will treat with steroid taper. Will give ortho referral for further evaluation and management. Discussed return precautions. ED Course:   Initial assessment performed. The patients presenting problems have been discussed, and they are in agreement with the care plan formulated and outlined with them. I have encouraged them to ask questions as they arise throughout their visit. Disposition:  9:38 AM  The patient has been re-evaluated and is ready for discharge. Reviewed available results with patient. Counseled patient on diagnosis and care plan. Patient has expressed understanding, and all questions have been answered. Patient agrees with plan and agrees to follow up as recommended, or to return to the ED if their symptoms worsen. Discharge instructions have been provided and explained to the patient, along with reasons to return to the ED. PLAN:  1. Discharge Medication List as of 11/17/2021  9:38 AM      START taking these medications    Details   ! ! predniSONE (STERAPRED DS) 10 mg dose pack Follow taper, Normal, Disp-21 Tablet, R-0      oxyCODONE-acetaminophen (Percocet) 5-325 mg per tablet Take 1 Tablet by mouth every six (6) hours as needed for Pain for up to 3 days.  Max Daily Amount: 4 Tablets., Normal, Disp-10 Tablet, R-0      methocarbamoL (ROBAXIN) 750 mg tablet Take 1 Tablet by mouth four (4) times daily. , Normal, Disp-20 Tablet, R-0       !! - Potential duplicate medications found. Please discuss with provider. CONTINUE these medications which have NOT CHANGED    Details   ! ! predniSONE (STERAPRED DS) 10 mg dose pack Take as directed, Normal, Disp-48 Tablet, R-0      norethindrone-ethinyl estradiol-iron (Junel FE 1.5/30, 28,) 1.5 mg-30 mcg (21)/75 mg (7) tab Take 1 Tablet by mouth daily. , Normal, Disp-3 Package, R-3      Tecfidera 240 mg cpDR TAKE ONE CAPSULE BY MOUTH TWICE DAILY. STORE IN ORIGINAL CONTAINER AT ROOM TEMPERATURE., Normal, Disp-60 Capsule, R-9, PANDA      ferrous sulfate (IRON PO) Take  by mouth., Historical Med      docusate sodium (STOOL SOFTENER PO) Take  by mouth., Historical Med      calcium carb/vitamin D3/vit K1 (SOFT CHEWS CALCIUM PO) Take  by mouth., Historical Med      Biotin 2,500 mcg cap Take  by mouth. Takes 10,000, Historical Med      LORATADINE (CLARITIN PO) Take 10 mg by mouth as needed., Historical Med       !! - Potential duplicate medications found. Please discuss with provider. 2.   Follow-up Information     Follow up With Specialties Details Why Contact Simin Piper, DO Orthopedic Surgery Call  to schedule a follow up appointment if not improving [address].  [phone]      \[facility] [place] [facility]\"" EMERGENCY DEPT Emergency Medicine Go to  If symptoms worsen [address] [place] [address]  [phone]        Return to ED if worse     Diagnosis     Clinical Impression:   1. Cervical radiculopathy    2. Degenerative disc disease, cervical            [place] .  AIRAM Macias

## 2021-11-21 ENCOUNTER — HOSPITAL ENCOUNTER (OUTPATIENT)
Dept: MRI IMAGING | Age: 46
Discharge: HOME | End: 2021-11-21
Payer: COMMERCIAL

## 2021-11-21 DIAGNOSIS — G35 MULTIPLE SCLEROSIS (HCC): ICD-10-CM

## 2021-11-21 PROCEDURE — 70553 MRI BRAIN STEM W/O & W/DYE: CPT

## 2021-11-21 PROCEDURE — 74011250636 HC RX REV CODE- 250/636

## 2021-11-21 PROCEDURE — A9576 INJ PROHANCE MULTIPACK: HCPCS

## 2021-11-21 RX ADMIN — GADOTERIDOL 13 ML: 279.3 INJECTION, SOLUTION INTRAVENOUS at 08:15

## 2021-11-22 NOTE — PROGRESS NOTES
Reviewed results of test via [facility] with the patient as follows:    Good news your MRI scan of your brain does not show any changes which is what we want :-)

## 2021-11-24 ENCOUNTER — APPOINTMENT (OUTPATIENT)
Dept: MAMMOGRAPHY | Age: 46
End: 2021-11-24
Attending: FAMILY MEDICINE

## 2021-11-30 ENCOUNTER — VIRTUAL VISIT (OUTPATIENT)
Dept: NEUROLOGY | Age: 46
End: 2021-11-30
Payer: COMMERCIAL

## 2021-11-30 ENCOUNTER — TELEPHONE (OUTPATIENT)
Dept: NEUROLOGY | Age: 46
End: 2021-11-30

## 2021-11-30 DIAGNOSIS — G35 MULTIPLE SCLEROSIS EXACERBATION (HCC): ICD-10-CM

## 2021-11-30 DIAGNOSIS — E04.1 THYROID NODULE: ICD-10-CM

## 2021-11-30 DIAGNOSIS — G35 MULTIPLE SCLEROSIS (HCC): Primary | ICD-10-CM

## 2021-11-30 PROBLEM — R53.83 FATIGUE: Status: ACTIVE | Noted: 2021-11-30

## 2021-11-30 PROBLEM — G56.02 CARPAL TUNNEL SYNDROME OF LEFT WRIST: Status: ACTIVE | Noted: 2021-11-30

## 2021-11-30 PROCEDURE — 99215 OFFICE O/P EST HI 40 MIN: CPT | Performed by: PSYCHIATRY & NEUROLOGY

## 2021-11-30 RX ORDER — METHYLPREDNISOLONE SODIUM SUCCINATE 125 MG/2ML
1000 INJECTION, POWDER, LYOPHILIZED, FOR SOLUTION INTRAMUSCULAR; INTRAVENOUS DAILY
Qty: 3000 MG | Refills: 0 | Status: SHIPPED | OUTPATIENT
Start: 2021-11-30 | End: 2021-12-03

## 2021-11-30 RX ORDER — LORATADINE AND PSEUDOEPHEDRINE SULFATE 5; 120 MG/1; MG/1
1 TABLET, EXTENDED RELEASE ORAL
COMMUNITY

## 2021-11-30 NOTE — ASSESSMENT & PLAN NOTE
With exacerbation starting October 2021   and still with persistent symptoms in her right arm to include numbness weakness and decreased functioning and pain    She only received 1 dose of IV Solu-Medrol and followed up with oral steroids but without improvement    Now she is also developing bladder issues      We will reorder IV Solu-Medrol x3 doses    Patient will continue on Tecfidera but due to her patient assistance she will need to switch to generic x1 month    We also discussed changing therapy and she will look into [facility]    She needs to complete her MRI scan both of the neck and the thoracic spine and she states she will get that scheduled shortly

## 2021-11-30 NOTE — PATIENT INSTRUCTIONS
As per discussion    I am going to authorize 3 days of IV Solu-Medrol to help with this flareup    I do absolutely need you to get those MRI scans completed of your neck and middle spine    The names of the medications to review include Ocrevus which is the 6-month infusion and Polo Chaya which is a once a month injection they are similar drugs and work by similar mechanism can is him of action of the delivery system is what is different    Stay in touch with me via [facility] if your symptoms do not improve by the first of the new year please let me know    And of course if anything gets worse let me know    I am can double book you to see me back in January so we can see how you are doing after the Solu-Medrol and talk about medication change if appropriate    I will stay in touch with you regarding the results of the MRI scan and plan of action pending those results via [employer]    I hope you have a wonderful holiday season and happy new year    Office Policies    o Phone calls/patient messages:  Please allow up to 24 hours for someone in the office to contact you about your call or message. Be mindful your provider may be out of the office or your message may require further review. We encourage you to use [employer] for your messages as this is a faster, more efficient way to communicate with our office    o Medication Refills:  Prescription medications require up to 48 business hours to process. We encourage you to use [employer] for your refills. For controlled medications: Please allow up to 72 business hours to process. Certain medications may require you to  a written prescription at our office. NO narcotic/controlled medications will be prescribed after 4pm Monday through Friday or on weekends    o Form/Paperwork Completion:  We ask that you allow 7-14 business days. You may also download your forms to [employer] to have your doctor print off.
Neuro

## 2021-11-30 NOTE — PROGRESS NOTES
[facility] Virtual FOLLOW-UP VISIT        Louie Bone is a 55 y.o. female who was seen by synchronous (real-time) audio-video technology on 11/30/2021. Louie Bone is a 55 y.o. female who presents today for the following:  Chief Complaint   Patient presents with    Multiple Sclerosis     VV 6m f/u, [address]  Patient is known to this practice. This is my first time seeing the patient. Chart and history reviewed in detail at today's office visit. 1. Multiple sclerosis ([address]  Assessment & Plan: With exacerbation starting October 2021   and still with persistent symptoms in her right arm to include numbness weakness and decreased functioning and pain    She only received 1 dose of IV Solu-Medrol and followed up with oral steroids but without improvement    Now she is also developing bladder issues      We will reorder IV Solu-Medrol x3 doses    Patient will continue on Tecfidera but due to her patient assistance she will need to switch to generic x1 month    We also discussed changing therapy and she will look into [facility] and [facility]    She needs to complete her MRI scan both of the neck and the thoracic spine and she states she will get that scheduled shortly      2. Multiple sclerosis exacerbation (HCC)  -     methylPREDNISolone sod succ (,Solu-MEDROL) 125 mg injection; 1,000 mg by IntraVENous route daily for 3 doses. , Print, Disp-3000 mg, R-0  -     REFERRAL TO INFUSION THERAPY  3. Thyroid nodule  Comments:  Evaluated by ENT: per pt report Benign      Patient and/or family was given time to ask questions and voice concerns. I believe all questions concerns were adequately addressed at this  office visit.   Patient and/or family also verbalized agreement and understanding of the above-stated plan    Patient remains a complex patient secondary to polypharmacy, significant comorbid conditions, and use of high-risk medications which complicate the decision making process related to patient's neurologic diagnosis    Follow-up and Dispositions    · Return in about 6 weeks (around 1/11/2022) for In office appointment. ICD-10-CM ICD-9-CM    1. Multiple sclerosis ([address]  G35 340    2. Multiple sclerosis exacerbation (HCC)  G35 340 methylPREDNISolone sod succ (,Solu-MEDROL) 125 mg injection      REFERRAL TO INFUSION THERAPY   3. Thyroid nodule  E04.1 241.0     Evaluated by ENT: per pt report Benign         I attest that 40 minutes was spent on today's visit reviewing medical records and diagnostic testing deemed pertinent to this patient's care, along with direct time spent at patient's visit including the history, physical assessment and plan, discussing diagnosis and management along with documentation. HPI  Historical Data  Patient is known to the practice and was previously seen by Dr. Marija uNnes    Neurologic diagnosis  Multiple sclerosis   Diagnosed November 2016    Presented with sudden Left leg weakness and fatigue    January 2016: Diplopia     Symptom onset: Age of 29 left-sided numbness      Other significant comorbid conditions/concerns  Carpal tunnel syndrome      Interim Data:   Multiple sclerosis  Current DMT  Tecfidera 240 mg twice daily    Past DMT  Copaxone: Side effects  Rebif    Paresthsia  Rt sided paresthesia: new as of October 10, 2021    Weakness:  Rt hand weakness    Bladder   No incontinence but severe urgency new as of October 2021    Denies changes in the following:  Vision  Hearing  Bowel function  Cognition  Mood  Fatigue      Pertinent diagnostic data    Results from Hospital Encounter encounter on 11/21/21    MRI BRAIN W WO CONT    Impression  Stable burden of white matter disease without acute findings or  enhancing  lesion to suggest active demyelination by this technique at this  time.       Results from Hospital Encounter encounter on 07/08/19    MRI BRAIN W WO CONT    Impression  IMPRESSION:  1. Stable numerous white matter lesions with an appearance and pattern  consistent with demyelinating disease. No evidence of active demyelinating  plaque. MRI CERV SPINE W WO CONT    Impression  IMPRESSION:    1. Numerous cord lesions again shown without enhancement abnormality to suggest  active plaque formation. 2. Diminished T1 and T2 signal in left posterolateral C6 and C7 vertebral bodies  again shown. Interval inferior right posterolateral C5 T1 and T2 hypointense  lesion. Given the appearance of a new finding at C5, further evaluation with a  bone scan/SPECT is recommended to determine whether these are radionuclide avid  lesions. 3. Large right lobe thyroid mass again shown which clinical and dedicated  imaging correlation is recommended, if not already performed. Admission on 10/10/2021, Discharged on 10/10/2021   Component Date Value Ref Range Status    Magnesium 10/10/2021 2.1  1.6 - 2.4 mg/dL Final    WBC 10/10/2021 4.1  3.6 - 11.0 K/uL Final    RBC 10/10/2021 4.50  3.80 - 5.20 M/uL Final    HGB 10/10/2021 14.4  11.5 - 16.0 g/dL Final    HCT 10/10/2021 42.1  35.0 - 47.0 % Final    MCV 10/10/2021 93.6  80.0 - 99.0 FL Final    MCH 10/10/2021 32.0  26.0 - 34.0 PG Final    MCHC 10/10/2021 34.2  30.0 - 36.5 g/dL Final    RDW 10/10/2021 12.6  11.5 - 14.5 % Final    PLATELET 85/19/1676 136  150 - 400 K/uL Final    MPV 10/10/2021 10.9  8.9 - 12.9 FL Final    NRBC 10/10/2021 0.0  0  WBC Final    ABSOLUTE NRBC 10/10/2021 0.00  0.00 - 0.01 K/uL Final    NEUTROPHILS 10/10/2021 66  32 - 75 % Final    LYMPHOCYTES 10/10/2021 21  12 - 49 % Final    MONOCYTES 10/10/2021 10  5 - 13 % Final    EOSINOPHILS 10/10/2021 2  0 - 7 % Final    BASOPHILS 10/10/2021 1  0 - 1 % Final    IMMATURE GRANULOCYTES 10/10/2021 0  0.0 - 0.5 % Final    ABS. NEUTROPHILS 10/10/2021 2.7  1.8 - 8.0 K/UL Final    ABS.  LYMPHOCYTES 10/10/2021 0.9  0.8 - 3.5 K/UL Final    ABS. MONOCYTES 10/10/2021 0.4  0.0 - 1.0 K/UL Final    ABS. EOSINOPHILS 10/10/2021 0.1  0.0 - 0.4 K/UL Final    ABS. BASOPHILS 10/10/2021 0.0  0.0 - 0.1 K/UL Final    ABS. IMM. GRANS. 10/10/2021 0.0  0.00 - 0.04 K/UL Final    DF 10/10/2021 AUTOMATED    Final    Sodium 10/10/2021 138  136 - 145 mmol/L Final    Potassium 10/10/2021 3.6  3.5 - 5.1 mmol/L Final    Chloride 10/10/2021 105  97 - 108 mmol/L Final    CO2 10/10/2021 31  21 - 32 mmol/L Final    Anion gap 10/10/2021 2* 5 - 15 mmol/L Final    Glucose 10/10/2021 106* 65 - 100 mg/dL Final    BUN 10/10/2021 13  6 - 20 MG/DL Final    Creatinine 10/10/2021 0.59  0.55 - 1.02 MG/DL Final    BUN/Creatinine ratio 10/10/2021 22* 12 - 20   Final    GFR est AA 10/10/2021 >60  >60 ml/min/1.73m2 Final    GFR est non-AA 10/10/2021 >60  >60 ml/min/1.73m2 Final    Estimated GFR is calculated using the IDMS-traceable Modification of Diet in Renal Disease (MDRD) Study equation, reported for both  Americans (GFRAA) and non- Americans (GFRNA), and normalized to 1.73m2 body surface area. The physician must decide which value applies to the patient.  Calcium 10/10/2021 9.3  8.5 - 10.1 MG/DL Final    Bilirubin, total 10/10/2021 0.5  0.2 - 1.0 MG/DL Final    ALT (SGPT) 10/10/2021 20  12 - 78 U/L Final    AST (SGOT) 10/10/2021 12* 15 - 37 U/L Final    Alk. phosphatase 10/10/2021 64  45 - 117 U/L Final    Protein, total 10/10/2021 8.7* 6.4 - 8.2 g/dL Final    Albumin 10/10/2021 4.1  3.5 - 5.0 g/dL Final    Globulin 10/10/2021 4.6* 2.0 - 4.0 g/dL Final    A-G Ratio 10/10/2021 0.9* 1.1 - 2.2   Final    Troponin-High Sensitivity 10/10/2021 <4  0 - 51 ng/L Final    Up to 50% of normal patients may have low levels of detectable troponin (within reference range) circulating in the blood. Mild elevations in troponin that are above the reference range, may be present with other cardiac and non-cardiac disease states.  Two or three serial tests at two hour intervals, are recommended to evaluate changes in circulating troponin over time.  Ventricular Rate 10/10/2021 80  BPM Final    Atrial Rate 10/10/2021 92  BPM Final    P-R Interval 10/10/2021 164  ms Final    QRS Duration 10/10/2021 84  ms Final    Q-T Interval 10/10/2021 356  ms Final    QTC Calculation (Bezet) 10/10/2021 410  ms Final    Calculated P Axis 10/10/2021 63  degrees Final    Calculated R Axis 10/10/2021 75  degrees Final    Calculated T Axis 10/10/2021 53  degrees Final    Diagnosis 10/10/2021    Final                    Value:Sinus rhythm with marked sinus arrhythmia  No previous ECGs available  Confirmed by Larry Pedersen PBashirVBashir ([id number]) on 10/10/2021 11:03:29 PM      Color 10/10/2021 YELLOW/STRAW    Final    Color Reference Range: Straw, Yellow or Dark Yellow    Appearance 10/10/2021 CLEAR  CLEAR   Final    Specific gravity 10/10/2021 1.016  1.003 - 1.030   Final    pH (UA) 10/10/2021 7.0  5.0 - 8.0   Final    Protein 10/10/2021 Negative  NEG mg/dL Final    Glucose 10/10/2021 Negative  NEG mg/dL Final    Ketone 10/10/2021 Negative  NEG mg/dL Final    Bilirubin 10/10/2021 Negative  NEG   Final    Blood 10/10/2021 Negative  NEG   Final    Urobilinogen 10/10/2021 1.0  0.2 - 1.0 EU/dL Final    Nitrites 10/10/2021 Negative  NEG   Final    Leukocyte Esterase 10/10/2021 Negative  NEG   Final    WBC 10/10/2021 0-4  0 - 4 /hpf Final    RBC 10/10/2021 0-5  0 - 5 /hpf Final    Epithelial cells 10/10/2021 FEW  FEW /lpf Final    Epithelial cell category consists of squamous cells and /or transitional urothelial cells. Renal tubular cells, if present, are separately identified as such.     Bacteria 10/10/2021 Negative  NEG /hpf Final    UA:UC IF INDICATED 10/10/2021 CULTURE NOT INDICATED BY UA RESULT  CNI   Final    Hyaline cast 10/10/2021 0-2  0 - 5 /lpf Final         No Known Allergies    Current Outpatient Medications   Medication Sig    loratadine-pseudoephedrine (Claritin-D 12 Hour) 5-120 mg per tablet Take 1 Tablet by mouth daily as needed.  methylPREDNISolone sod succ (,Solu-MEDROL) 125 mg injection 1,000 mg by IntraVENous route daily for 3 doses.  norethindrone-ethinyl estradiol-iron (Junel FE 1.5/30, 28,) 1.5 mg-30 mcg (21)/75 mg (7) tab Take 1 Tablet by mouth daily.  Tecfidera 240 mg cpDR TAKE ONE CAPSULE BY MOUTH TWICE DAILY. STORE IN ORIGINAL CONTAINER AT ROOM TEMPERATURE.  ferrous sulfate (IRON PO) Take  by mouth.  docusate sodium (STOOL SOFTENER PO) Take  by mouth.  calcium carb/vitamin D3/vit K1 (SOFT CHEWS CALCIUM PO) Take  by mouth.  Biotin 2,500 mcg cap Take 10,000 mcg by mouth daily. Takes 10,000    LORATADINE (CLARITIN PO) Take 10 mg by mouth as needed. (Patient not taking: Reported on 11/30/2021)     No current facility-administered medications for this visit. Past medical history/surgical history, family history, and social history have been reviewed for today's visit      ROS    A ten system review of constitutional, cardiovascular, respiratory, musculoskeletal, endocrine, skin, SHEENT, genitourinary, psychiatric and neurologic systems was obtained and is unremarkable except as mentioned under HPI          EXAMINATION: Within the context of virtual telehealth visit:    General appearance: Patient is well-developed and well-nourished in no apparent distress and well groomed.     Psych/mental health:  Affect: Appropriate    PHQ  3 most recent PHQ Screens 11/30/2021   Little interest or pleasure in doing things Not at all   Feeling down, depressed, irritable, or hopeless Not at all   Total Score PHQ 2 0           HEENT:   Normocephalic  With evidence of trauma: No  Full range of motion head neck: Yes  Tenderness to palpation of the head neck region: N/A      Cardiovascular:     Extremities warm to touch: N/A  Extremity swelling: No  Discoloration: No  Evidence of PVD: No    Respiratory:   Dyspnea on exertion: No   Abnormal effort on casual observation: No   Use of portable oxygen: No   Evidence of cyanosis: No     Musculoskeletal:   Evidence of significant bone deformities: No   Spinal curvature: No     Integumentary:    Obvious bruising: No   Lacerations or discoloration on casual observation: No       Neurological Examination:   Mental Status:        MMSE  No flowsheet data found. Formal testing was not completed    there was nothing concerning on general observation and discussion. Alert oriented and appropriate to general conversation  Normal processing on general observation  Followed conversation and responded seemingly appropriate throughout the office visit  No word finding difficulties noted on casual observation  Able to follow directions without difficulty     Cranial Nerves:    Grossly intact    Motor:   Normal bulk  No tremor appreciated on today's exam  No abnormal movements appreciated on today's exam  Moves extremities spontaneously and with purpose      Sensation: Not tested    Coordination/Cerebellar:   Grossly intact    Gait: Not tested    Fall risk assessment  No flowsheet data found. Due to this being a TeleHealth evaluation, many elements of the physical examination are unable to be assessed. Pursuant to the emergency declaration under the [facility] [place] [address] [facility] authority and the [employer] Act, this Virtual  Visit was conducted, with patient's consent, to reduce the patient's risk of exposure to COVID-19 and provide continuity of care for an established patient. Services were provided through a video synchronous discussion virtually to substitute for in-person clinic visit.           Elvira Greene MS, ANP-BC, [facility]

## 2021-11-30 NOTE — PROGRESS NOTES
Chief Complaint   Patient presents with    Multiple Sclerosis     VV 6m f/u, [place]       1. Have you been to the ER, urgent care clinic since your last visit? Hospitalized since your last visit[facility]. 2. Have you seen or consulted any other health care providers outside of the [address] [place] [address] since your last visit? Include any pap smears or colon screening.  No

## 2021-11-30 NOTE — TELEPHONE ENCOUNTER
Faxed order for Solumedrol along with script to the central [facility] at [phone]

## 2021-12-03 ENCOUNTER — HOSPITAL ENCOUNTER (OUTPATIENT)
Dept: MAMMOGRAPHY | Age: 46
Discharge: HOME | End: 2021-12-03
Attending: FAMILY MEDICINE
Payer: COMMERCIAL

## 2021-12-03 DIAGNOSIS — N63.0 BREAST MASS: ICD-10-CM

## 2021-12-03 PROCEDURE — 77062 BREAST TOMOSYNTHESIS BI: CPT

## 2021-12-03 NOTE — PROGRESS NOTES
Pt scheduled for solumdrol 12/6, 12/7, and 12/8, however she needs an appointment early in the day. This is not available until 1/10, 1/11, 1/12. Pt preferred to wait for these time slots. Appointments rescheduled.

## 2021-12-06 ENCOUNTER — HOSPITAL ENCOUNTER (OUTPATIENT)
Dept: INFUSION THERAPY | Age: 46
Discharge: HOME | End: 2021-12-06
Payer: COMMERCIAL

## 2021-12-06 ENCOUNTER — HOSPITAL ENCOUNTER (OUTPATIENT)
Dept: INFUSION THERAPY | Age: 46
Discharge: HOME | End: 2021-12-06

## 2021-12-06 VITALS
TEMPERATURE: 97.8 F | SYSTOLIC BLOOD PRESSURE: 122 MMHG | OXYGEN SATURATION: 98 % | WEIGHT: 149.6 LBS | HEART RATE: 94 BPM | BODY MASS INDEX: 27.36 KG/M2 | DIASTOLIC BLOOD PRESSURE: 77 MMHG | RESPIRATION RATE: 16 BRPM

## 2021-12-06 PROCEDURE — 74011250636 HC RX REV CODE- 250/636: Performed by: PSYCHIATRY & NEUROLOGY

## 2021-12-06 PROCEDURE — 74011000258 HC RX REV CODE- 258: Performed by: PSYCHIATRY & NEUROLOGY

## 2021-12-06 PROCEDURE — 96365 THER/PROPH/DIAG IV INF INIT: CPT

## 2021-12-06 RX ORDER — SODIUM CHLORIDE 0.9 % (FLUSH) 0.9 %
5-10 SYRINGE (ML) INJECTION AS NEEDED
Status: DISCONTINUED | OUTPATIENT
Start: 2021-12-06 | End: 2021-12-07 | Stop reason: HOSPADM

## 2021-12-06 RX ORDER — SODIUM CHLORIDE 9 MG/ML
25 INJECTION, SOLUTION INTRAVENOUS AS NEEDED
Status: DISCONTINUED | OUTPATIENT
Start: 2021-12-06 | End: 2021-12-07 | Stop reason: HOSPADM

## 2021-12-06 RX ADMIN — Medication 10 ML: at 14:15

## 2021-12-06 RX ADMIN — Medication 10 ML: at 13:10

## 2021-12-06 RX ADMIN — SODIUM CHLORIDE 1000 MG: 900 INJECTION, SOLUTION INTRAVENOUS at 13:15

## 2021-12-06 NOTE — PROGRESS NOTES
- Pt arrived to [facility] ambulatory in no acute distress for Solumedrol Day 1/3. Assessment unremarkable except right arm weakness. IV established in left AC without issue and positive blood return noted. Patient denied having any symptoms of COVID-19, i.e. SOB, coughing, fever, or generally not feeling well. Also denies having been exposed to COVID-19 recently or having had any recent contact with family/friend that has a pending COVID test.     The following medications administered:  Medications Administered     methylPREDNISolone ((Solu-MEDROL) 1,000 mg in 0.9% sodium chloride (MBP/ADV) 100 mL     Admin Date  12/06/2021 Action  New Bag Dose  1,000 mg Rate  100 mL/hr Route  IntraVENous Administered By  Gomez Mcneal RN          sodium chloride (NS) flush 5-10 mL     Admin Date  12/06/2021 Action  Given Dose  10 mL Route  IntraVENous Administered By  Gomez Mcneal RN           Admin Date  12/06/2021 Action  Given Dose  10 mL Route  IntraVENous Administered By  Gomez Mcneal RN              Patient Vitals for the past 12 hrs:   Temp Pulse Resp BP SpO2   12/06/21 1415  94 16 122/77    12/06/21 1307 97.8 °F (36.6 °C) 99 16 134/83 98 %     1415- Pt tolerated treatment well, no adverse reactions noted. IV flushed per policy and removed, 2x2 and coban placed. Pt discharged ambulatory in no acute distress, accompanied by self. Next appointment 12/7/21.

## 2021-12-07 ENCOUNTER — HOSPITAL ENCOUNTER (OUTPATIENT)
Dept: INFUSION THERAPY | Age: 46
Discharge: HOME | End: 2021-12-07
Payer: COMMERCIAL

## 2021-12-07 ENCOUNTER — HOSPITAL ENCOUNTER (OUTPATIENT)
Dept: INFUSION THERAPY | Age: 46
Discharge: HOME | End: 2021-12-07

## 2021-12-07 VITALS
SYSTOLIC BLOOD PRESSURE: 124 MMHG | DIASTOLIC BLOOD PRESSURE: 81 MMHG | TEMPERATURE: 97.1 F | HEART RATE: 73 BPM | RESPIRATION RATE: 18 BRPM

## 2021-12-07 PROCEDURE — 96365 THER/PROPH/DIAG IV INF INIT: CPT

## 2021-12-07 PROCEDURE — 74011250636 HC RX REV CODE- 250/636: Performed by: PSYCHIATRY & NEUROLOGY

## 2021-12-07 PROCEDURE — 74011000258 HC RX REV CODE- 258: Performed by: PSYCHIATRY & NEUROLOGY

## 2021-12-07 RX ADMIN — SODIUM CHLORIDE 1000 MG: 900 INJECTION, SOLUTION INTRAVENOUS at 15:15

## 2021-12-07 NOTE — PROGRESS NOTES
Outpatient Infusion Center Progress Note    1500  Pt arrived in stable condition for Solu-medrol 2/3. Assessment completed; no changes. Patient denied having any symptoms of COVID-19, i.e. SOB, coughing, fever, or generally not feeling well. Also denies having been exposed to COVID-19 recently or having had any recent contact with family/friend that has a pending COVID test.     24G PIV established in [facility] without issue and positive blood return noted. Patient Vitals for the past 12 hrs:   Temp Pulse Resp BP   12/07/21 1618  73 18 124/81   12/07/21 1500 97.1 °F (36.2 °C) 97 18 134/89        The following medications administered:  Medications Administered     methylPREDNISolone ((Solu-MEDROL) 1,000 mg in 0.9% sodium chloride (MBP/ADV) 100 mL     Admin Date  12/07/2021 Action  New Bag Dose  1,000 mg Rate  100 mL/hr Route  IntraVENous Administered By  Brianna Escalante, RN                Pt tolerated treatment well. IV flushed per policy and removed, 2x2 and coban placed. Pt provided with education on possible side effects of medication along with discharge instructions. Pt verbalized understanding. 1620  Pt discharged in no acute distress.  Next appointment:    Future Appointments   Date Time Provider Quynh Iglesias   12/8/2021  3:00 PM [facility] MED7 [place] [address] REG   12/21/2021 11:15 AM [facility] MRI 1 [facility] REG   12/21/2021 12:00 PM [facility] MRI 1 [facility] REG   1/11/2022 12:00 PM MARIBEL Heck AMB   8/29/2022  8:30 AM Sonny Guthrie MD [facility] JAVY BS AMB

## 2021-12-08 ENCOUNTER — HOSPITAL ENCOUNTER (OUTPATIENT)
Dept: INFUSION THERAPY | Age: 46
Discharge: HOME | End: 2021-12-08
Payer: COMMERCIAL

## 2021-12-08 ENCOUNTER — HOSPITAL ENCOUNTER (OUTPATIENT)
Dept: INFUSION THERAPY | Age: 46
Discharge: HOME | End: 2021-12-08

## 2021-12-08 VITALS
SYSTOLIC BLOOD PRESSURE: 146 MMHG | DIASTOLIC BLOOD PRESSURE: 84 MMHG | TEMPERATURE: 98 F | HEART RATE: 94 BPM | RESPIRATION RATE: 18 BRPM

## 2021-12-08 PROCEDURE — 96365 THER/PROPH/DIAG IV INF INIT: CPT

## 2021-12-08 PROCEDURE — 74011000258 HC RX REV CODE- 258: Performed by: PSYCHIATRY & NEUROLOGY

## 2021-12-08 PROCEDURE — 74011250636 HC RX REV CODE- 250/636: Performed by: PSYCHIATRY & NEUROLOGY

## 2021-12-08 RX ADMIN — SODIUM CHLORIDE 1000 MG: 900 INJECTION, SOLUTION INTRAVENOUS at 14:56

## 2021-12-08 NOTE — PROGRESS NOTES
Outpatient Infusion Center Progress Note    1450  Pt arrived in stable condition for Solumedrol 3/3    Assessment completed, no new complaints. Patient seeing improvments in R sided symptoms. Patient denied having any symptoms of COVID-19, i.e. SOB, coughing, fever, or generally not feeling well. Also denies having been exposed to COVID-19 recently or having had any recent contact with family/friend that has a pending COVID test.     24G PIV established in [facility] without issue and positive blood return noted. Patient Vitals for the past 12 hrs:   Temp Pulse Resp BP   12/08/21 1452 98 °F (36.7 °C) 94 18 (!) 146/84          The following medications administered:  Medications Administered     methylPREDNISolone ((Solu-MEDROL) 1,000 mg in 0.9% sodium chloride (MBP/ADV) 100 mL     Admin Date  12/08/2021 Action  New Bag Dose  1,000 mg Rate  100 mL/hr Route  IntraVENous Administered By  Joana Morgan RN                Pt tolerated treatment well. IV flushed per policy and removed, 2x2 and coban placed. Pt provided with education on possible side effects of medication along with discharge instructions. Pt verbalized understanding. 1600  Pt discharged in no acute distress.  Next appointment:    Future Appointments   Date Time Provider Quynh Iglesias   12/21/2021 11:15 AM [facility] MRI 1 [facility] REG   12/21/2021 12:00 PM [facility] MRI 1 [facility] REG   1/11/2022 12:00 PM MARIBEL Maravilla   8/29/2022  8:30 AM Adam Villalobos MD [facility] JAVY GUY AMB

## 2021-12-14 DIAGNOSIS — G35 MULTIPLE SCLEROSIS (HCC): ICD-10-CM

## 2021-12-14 RX ORDER — DIMETHYL FUMARATE 240 MG/1
CAPSULE ORAL
Qty: 60 CAPSULE | Refills: 9 | Status: SHIPPED | OUTPATIENT
Start: 2021-12-14 | End: 2021-12-30

## 2021-12-21 ENCOUNTER — HOSPITAL ENCOUNTER (OUTPATIENT)
Dept: MRI IMAGING | Age: 46
Discharge: HOME | End: 2021-12-21
Payer: COMMERCIAL

## 2021-12-21 DIAGNOSIS — G35 MULTIPLE SCLEROSIS (HCC): ICD-10-CM

## 2021-12-21 PROCEDURE — A9576 INJ PROHANCE MULTIPACK: HCPCS | Performed by: RADIOLOGY

## 2021-12-21 PROCEDURE — 74011250636 HC RX REV CODE- 250/636: Performed by: RADIOLOGY

## 2021-12-21 PROCEDURE — 72157 MRI CHEST SPINE W/O & W/DYE: CPT

## 2021-12-21 PROCEDURE — 72156 MRI NECK SPINE W/O & W/DYE: CPT

## 2021-12-21 RX ADMIN — GADOTERIDOL 13 ML: 279.3 INJECTION, SOLUTION INTRAVENOUS at 11:33

## 2021-12-22 DIAGNOSIS — M48.02 CERVICAL SPINAL STENOSIS: Primary | ICD-10-CM

## 2021-12-22 NOTE — PROGRESS NOTES
Results of the tests were reviewed in MyChart with the patient as follows:Results of your cervical and thoracic MRI are as follows:  Cervical MRI: No new lesions no worsening lesions no changing lesions related to MS. However you have worsening arthritis which might be contributory to your current symptoms    We can consider getting you some physical therapy to help reduce the irritation from the arthritis if you would like to pursue that. Just let me know    MRI of your thoracic spine does not show any evidence of MS which is awesome news.   And there is very minimal arthritis which does not need any intervention at this time

## 2021-12-22 NOTE — PROGRESS NOTES
I have ordered physical therapy for right arm pain secondary to cervical spinal stenosis    She has a known history of MS but her MS lesions are stable and unchanged and she has worsening cervical spinal stenosis disease noted on MRI scan that would be consistent with her symptoms

## 2021-12-22 NOTE — PROGRESS NOTES
Results of the tests were reviewed in MyChart with the patient as follows:MRI of your cervical and thoracic spine spine shows stability of multiple sclerosis so that is awesome    It does however show some increased arthritis which could be contributing to some of your right arm symptoms. If the symptoms are not improved I would recommend going to physical therapy I am going to write that prescription and my nurse can send that out to you and you can go to any physical therapy department that works for you regarding proximity.   I generally recommend [facility] either [facility] the [facility] physical therapy on the same campus of [facility] but if there is something closer to you based on where you live that would be fine as well    If your right arm symptoms are improved you can defer on the physical therapy

## 2021-12-30 ENCOUNTER — TELEPHONE (OUTPATIENT)
Dept: NEUROLOGY | Age: 46
End: 2021-12-30

## 2021-12-30 DIAGNOSIS — G35 MULTIPLE SCLEROSIS (HCC): Primary | ICD-10-CM

## 2021-12-30 RX ORDER — DIMETHYL FUMARATE 240 MG/1
240 CAPSULE ORAL 2 TIMES DAILY
Qty: 60 CAPSULE | Refills: 11 | Status: SHIPPED | OUTPATIENT
Start: 2021-12-30 | End: 2022-05-26

## 2021-12-30 NOTE — TELEPHONE ENCOUNTER
Prescription for brand-name sent to [address] [place] [address] Lesia. 60 tablets with refills x11

## 2021-12-30 NOTE — TELEPHONE ENCOUNTER
[phone] [facility]  [facility] [place] [facility] and found the patient is requesting to go back to brand Tecfidera at the beginning of January. Last shipped 12/3/21. Patient is requesting next shipment 1/5/22. Needs script sent in 1/3/22 latest 1/4/22    A new script will need to be sent in order for this to be the case.     Please send in the brand Tecfidera for the patient if warranted    I have set up the pharmacy already

## 2022-01-10 ENCOUNTER — APPOINTMENT (OUTPATIENT)
Dept: INFUSION THERAPY | Age: 47
End: 2022-01-10

## 2022-01-11 ENCOUNTER — VIRTUAL VISIT (OUTPATIENT)
Dept: NEUROLOGY | Age: 47
End: 2022-01-11
Payer: COMMERCIAL

## 2022-01-11 ENCOUNTER — APPOINTMENT (OUTPATIENT)
Dept: INFUSION THERAPY | Age: 47
End: 2022-01-11

## 2022-01-11 DIAGNOSIS — G35 MULTIPLE SCLEROSIS EXACERBATION (HCC): ICD-10-CM

## 2022-01-11 DIAGNOSIS — Z51.81 THERAPEUTIC DRUG MONITORING: ICD-10-CM

## 2022-01-11 DIAGNOSIS — G35 MULTIPLE SCLEROSIS (HCC): ICD-10-CM

## 2022-01-11 DIAGNOSIS — N31.9 NEUROGENIC BLADDER: ICD-10-CM

## 2022-01-11 DIAGNOSIS — Z79.899 HIGH RISK MEDICATION USE: Primary | ICD-10-CM

## 2022-01-11 PROCEDURE — 99215 OFFICE O/P EST HI 40 MIN: CPT | Performed by: PSYCHIATRY & NEUROLOGY

## 2022-01-11 NOTE — ASSESSMENT & PLAN NOTE
Patient was treated with 3 days of IV Solu-Medrol but after reviewing the MRI scans which were completed per Solu-Medrol December 2021 it was probably more reflective of arthritic foraminal stenosis issues with irritation on the right side    It is also possible it was an MS flare that did not show up on MRI scan the MRI scans were completed after the Solu-Medrol so it is possible that it quieted down the inflammation but usually lesions do not fully burnout for 3 months even with Solu-Medrol    Since we are not sure that this was a definitive exacerbation I do not think we need to switch DMT therapy at this time

## 2022-01-11 NOTE — ASSESSMENT & PLAN NOTE
Patient presently is responding well to behavioral intervention only.   We will continue to monitor over time if significantly worse this will need to be seen by  for further evaluation

## 2022-01-11 NOTE — ASSESSMENT & PLAN NOTE
Previous exacerbation of symptoms starting October 2021  But I believe that was more probably related to cervical stenosis    Continue Tecfidera presently she is on branded but if funding runs out we will just switch her to generic and this was discussed at today's office visit and she is in agreement with the plan    She will be due for blood work in March this will be ordered and patient can get it completed March 2022

## 2022-01-11 NOTE — PROGRESS NOTES
[facility]  TELEHEALTH Virtual FOLLOW-UP VISIT        Korina Miranda is a 55 y.o. female who was seen by synchronous (real-time) audio-video technology on 1/11/2022. Korina Miranda is a 55 y.o. female who presents today for the following:  Chief Complaint   Patient presents with    Multiple Sclerosis         ASSESSMENT AND PLAN  Patient is known to this practice. This is my first time seeing the patient. Chart and history reviewed in detail at today's office visit. 1. High risk medication use  -     CBC WITH AUTOMATED DIFF; Future  -     METABOLIC PANEL, COMPREHENSIVE; Future  -     STRATIFY JCV (TM) B W/INDEX  2. Multiple sclerosis exacerbation ([address]  Assessment & Plan:  Patient was treated with 3 days of IV Solu-Medrol but after reviewing the MRI scans which were completed per Solu-Medrol December 2021 it was probably more reflective of arthritic foraminal stenosis issues with irritation on the right side    It is also possible it was an MS flare that did not show up on MRI scan the MRI scans were completed after the Solu-Medrol so it is possible that it quieted down the inflammation but usually lesions do not fully burnout for 3 months even with Solu-Medrol    Since we are not sure that this was a definitive exacerbation I do not think we need to switch DMT therapy at this time  3. Multiple sclerosis ([address]  Assessment & Plan:  Previous exacerbation of symptoms starting October 2021  But I believe that was more probably related to cervical stenosis    Continue Tecfidera presently she is on branded but if funding runs out we will just switch her to generic and this was discussed at today's office visit and she is in agreement with the plan    She will be due for blood work in March this will be ordered and patient can get it completed March 2022  Orders:  -     CBC WITH AUTOMATED DIFF; Future  -     METABOLIC PANEL, COMPREHENSIVE;  Future  -     STRATIFY JCV (TM) B W/INDEX  4. Therapeutic drug monitoring  -     CBC WITH AUTOMATED DIFF; Future  -     METABOLIC PANEL, COMPREHENSIVE; Future  -     STRATIFY JCV (TM) B W/INDEX  5. Neurogenic bladder  Assessment & Plan:   Patient presently is responding well to behavioral intervention only. We will continue to monitor over time if significantly worse this will need to be seen by  for further evaluation      Patient and/or family was given time to ask questions and voice concerns. I believe all questions concerns were adequately addressed at this  office visit. Patient and/or family also verbalized agreement and understanding of the above-stated plan    Patient remains a complex patient secondary to polypharmacy, significant comorbid conditions, and use of high-risk medications which complicate the decision making process related to patient's neurologic diagnosis    Follow-up and Dispositions    · Return in about 6 months (around 7/11/2022) for Virtual visit. ICD-10-CM ICD-9-CM    1. High risk medication use  Z79.899 V58.69 CBC WITH AUTOMATED DIFF      METABOLIC PANEL, COMPREHENSIVE      STRATIFY JCV (TM) B W/INDEX   2. Multiple sclerosis exacerbation ([facility]  G35 340    3. Multiple sclerosis ([facility]  G35 340 CBC WITH AUTOMATED DIFF      METABOLIC PANEL, COMPREHENSIVE      STRATIFY JCV (TM) B W/INDEX   4. Therapeutic drug monitoring  Z51.81 V58.83 CBC WITH AUTOMATED DIFF      METABOLIC PANEL, COMPREHENSIVE      STRATIFY JCV (TM) B W/INDEX   5. Neurogenic bladder  N31.9 596.54          I attest that 40 minutes was spent on today's visit reviewing medical records and diagnostic testing deemed pertinent to this patient's care, along with direct time spent at patient's visit including the history, physical assessment and plan, discussing diagnosis and management along with documentation.       HPI  Historical Data  Patient is known to the practice and was previously seen by Dr. Curt Merchant    Neurologic diagnosis  Multiple sclerosis   Diagnosed November 2016    Presented with sudden Left leg weakness and fatigue    January 2016: Diplopia     Symptom onset: Age of 29 left-sided numbness      Other significant comorbid conditions/concerns  Carpal tunnel syndrome      Interim Data:   Multiple sclerosis  Current DMT  Tecfidera 240 mg twice daily    Past DMT  Copaxone: Side effects  Rebif    Paresthsia  Rt sided paresthesia: new as of October 10, 2021   Patient was worried it was an exacerbation we did treat her with IV Solu-Medrol MRI scans however did not show any change whatsoever but it does not did show moderate right-sided foraminal stenosis which may be contributory to her symptoms    Weakness:  Rt hand weakness   Resolved    Bladder   No incontinence but severe urgency new as of October 2021   Now only mild urgency and patient manages this with behavioral modification    Denies changes in the following:  Vision  Hearing  Bowel function  Cognition  Mood  Fatigue     reviewed the results of MRI scans with the patient at today's office visit I had center information briefly about the results via [employer]      Pertinent diagnostic data    Results from [place] encounter on 12/21/21    MRI [facility] W WO CONT    Impression  1. Unchanged cervical spinal cord chronic demyelinating disease. Decreased  thoracic spinal cord chronic demyelinating disease. No new or acute  demyelination in the cervical or thoracic spinal cord. 2. C4-C5 moderate right foraminal stenosis is increased. 3. C6-C7 mild right foraminal stenosis is new. MRI CERV SPINE W WO CONT    Impression  1. Unchanged cervical spinal cord chronic demyelinating disease. Decreased  thoracic spinal cord chronic demyelinating disease. No new or acute  demyelination in the cervical or thoracic spinal cord. 2. C4-C5 moderate right foraminal stenosis is increased. 3. C6-C7 mild right foraminal stenosis is new.       Results from [place] encounter on 11/21/21    MRI BRAIN W WO CONT    Impression  Stable burden of white matter disease without acute findings or  enhancing  lesion to suggest active demyelination by this technique at this  time. No visits with results within 3 Month(s) from this visit. Latest known visit with results is:   Admission on 10/10/2021, Discharged on 10/10/2021   Component Date Value Ref Range Status    Magnesium 10/10/2021 2.1  1.6 - 2.4 mg/dL Final    WBC 10/10/2021 4.1  3.6 - 11.0 K/uL Final    RBC 10/10/2021 4.50  3.80 - 5.20 M/uL Final    HGB 10/10/2021 14.4  11.5 - 16.0 g/dL Final    HCT 10/10/2021 42.1  35.0 - 47.0 % Final    MCV 10/10/2021 93.6  80.0 - 99.0 FL Final    MCH 10/10/2021 32.0  26.0 - 34.0 PG Final    MCHC 10/10/2021 34.2  30.0 - 36.5 g/dL Final    RDW 10/10/2021 12.6  11.5 - 14.5 % Final    PLATELET 97/08/8393 638  150 - 400 K/uL Final    MPV 10/10/2021 10.9  8.9 - 12.9 FL Final    NRBC 10/10/2021 0.0  0  WBC Final    ABSOLUTE NRBC 10/10/2021 0.00  0.00 - 0.01 K/uL Final    NEUTROPHILS 10/10/2021 66  32 - 75 % Final    LYMPHOCYTES 10/10/2021 21  12 - 49 % Final    MONOCYTES 10/10/2021 10  5 - 13 % Final    EOSINOPHILS 10/10/2021 2  0 - 7 % Final    BASOPHILS 10/10/2021 1  0 - 1 % Final    IMMATURE GRANULOCYTES 10/10/2021 0  0.0 - 0.5 % Final    ABS. NEUTROPHILS 10/10/2021 2.7  1.8 - 8.0 K/UL Final    ABS. LYMPHOCYTES 10/10/2021 0.9  0.8 - 3.5 K/UL Final    ABS. MONOCYTES 10/10/2021 0.4  0.0 - 1.0 K/UL Final    ABS. EOSINOPHILS 10/10/2021 0.1  0.0 - 0.4 K/UL Final    ABS. BASOPHILS 10/10/2021 0.0  0.0 - 0.1 K/UL Final    ABS. IMM. GRANS.  10/10/2021 0.0  0.00 - 0.04 K/UL Final    DF 10/10/2021 AUTOMATED    Final    Sodium 10/10/2021 138  136 - 145 mmol/L Final    Potassium 10/10/2021 3.6  3.5 - 5.1 mmol/L Final    Chloride 10/10/2021 105  97 - 108 mmol/L Final    CO2 10/10/2021 31  21 - 32 mmol/L Final    Anion gap 10/10/2021 2* 5 - 15 mmol/L Final    Glucose 10/10/2021 106* 65 - 100 mg/dL Final    BUN 10/10/2021 13  6 - 20 MG/DL Final    Creatinine 10/10/2021 0.59  0.55 - 1.02 MG/DL Final    BUN/Creatinine ratio 10/10/2021 22* 12 - 20   Final    GFR est AA 10/10/2021 >60  >60 ml/min/1.73m2 Final    GFR est non-AA 10/10/2021 >60  >60 ml/min/1.73m2 Final    Estimated GFR is calculated using the IDMS-traceable Modification of Diet in Renal Disease (MDRD) Study equation, reported for both  Americans (GFRAA) and non- Americans (GFRNA), and normalized to 1.73m2 body surface area. The physician must decide which value applies to the patient.  Calcium 10/10/2021 9.3  8.5 - 10.1 MG/DL Final    Bilirubin, total 10/10/2021 0.5  0.2 - 1.0 MG/DL Final    ALT (SGPT) 10/10/2021 20  12 - 78 U/L Final    AST (SGOT) 10/10/2021 12* 15 - 37 U/L Final    Alk. phosphatase 10/10/2021 64  45 - 117 U/L Final    Protein, total 10/10/2021 8.7* 6.4 - 8.2 g/dL Final    Albumin 10/10/2021 4.1  3.5 - 5.0 g/dL Final    Globulin 10/10/2021 4.6* 2.0 - 4.0 g/dL Final    A-G Ratio 10/10/2021 0.9* 1.1 - 2.2   Final    Troponin-High Sensitivity 10/10/2021 <4  0 - 51 ng/L Final    Up to 50% of normal patients may have low levels of detectable troponin (within reference range) circulating in the blood. Mild elevations in troponin that are above the reference range, may be present with other cardiac and non-cardiac disease states. Two or three serial tests at two hour intervals, are recommended to evaluate changes in circulating troponin over time.     Ventricular Rate 10/10/2021 80  BPM Final    Atrial Rate 10/10/2021 92  BPM Final    P-R Interval 10/10/2021 164  ms Final    QRS Duration 10/10/2021 84  ms Final    Q-T Interval 10/10/2021 356  ms Final    QTC Calculation (Bezet) 10/10/2021 410  ms Final    Calculated P Axis 10/10/2021 63  degrees Final    Calculated R Axis 10/10/2021 75  degrees Final    Calculated T Axis 10/10/2021 53  degrees Final    Diagnosis 10/10/2021 Final                    Value:Sinus rhythm with marked sinus arrhythmia  No previous ECGs available  Confirmed by [employer] ([id number]) on 10/10/2021 11:03:29 PM      Color 10/10/2021 YELLOW/STRAW    Final    Color Reference Range: Straw, Yellow or Dark Yellow    Appearance 10/10/2021 CLEAR  CLEAR   Final    Specific gravity 10/10/2021 1.016  1.003 - 1.030   Final    pH (UA) 10/10/2021 7.0  5.0 - 8.0   Final    Protein 10/10/2021 Negative  NEG mg/dL Final    Glucose 10/10/2021 Negative  NEG mg/dL Final    Ketone 10/10/2021 Negative  NEG mg/dL Final    Bilirubin 10/10/2021 Negative  NEG   Final    Blood 10/10/2021 Negative  NEG   Final    Urobilinogen 10/10/2021 1.0  0.2 - 1.0 EU/dL Final    Nitrites 10/10/2021 Negative  NEG   Final    Leukocyte Esterase 10/10/2021 Negative  NEG   Final    WBC 10/10/2021 0-4  0 - 4 /hpf Final    RBC 10/10/2021 0-5  0 - 5 /hpf Final    Epithelial cells 10/10/2021 FEW  FEW /lpf Final    Epithelial cell category consists of squamous cells and /or transitional urothelial cells. Renal tubular cells, if present, are separately identified as such.  Bacteria 10/10/2021 Negative  NEG /hpf Final    UA:UC IF INDICATED 10/10/2021 CULTURE NOT INDICATED BY UA RESULT  CNI   Final    Hyaline cast 10/10/2021 0-2  0 - 5 /lpf Final         No Known Allergies    Current Outpatient Medications   Medication Sig    Tecfidera 240 mg cpDR Take 240 mg by mouth two (2) times a day. Brand-name only do not dispense generic  Indications: relapsing form of multiple sclerosis    loratadine-pseudoephedrine (Claritin-D 12 Hour) 5-120 mg per tablet Take 1 Tablet by mouth daily as needed.  norethindrone-ethinyl estradiol-iron (Junel FE 1.5/30, 28,) 1.5 mg-30 mcg (21)/75 mg (7) tab Take 1 Tablet by mouth daily.  ferrous sulfate (IRON PO) Take  by mouth.  docusate sodium (STOOL SOFTENER PO) Take  by mouth.  Biotin 2,500 mcg cap Take 10,000 mcg by mouth daily.  Takes 10,000     No current facility-administered medications for this visit. Past medical history/surgical history, family history, and social history have been reviewed for today's visit      ROS    A ten system review of constitutional, cardiovascular, respiratory, musculoskeletal, endocrine, skin, SHEENT, genitourinary, psychiatric and neurologic systems was obtained and is unremarkable except as mentioned under HPI          EXAMINATION: Within the context of virtual telehealth visit:    General appearance: Patient is well-developed and well-nourished in no apparent distress and well groomed. Psych/mental health:  Affect: Appropriate    PHQ  3 most recent PHQ Screens 11/30/2021   Little interest or pleasure in doing things Not at all   Feeling down, depressed, irritable, or hopeless Not at all   Total Score PHQ 2 0           HEENT:   Normocephalic  With evidence of trauma: No  Full range of motion head neck: Yes  Tenderness to palpation of the head neck region: N/A      Cardiovascular:     Extremities warm to touch: N/A  Extremity swelling: No  Discoloration: No  Evidence of PVD: No    Respiratory:   Dyspnea on exertion: No   Abnormal effort on casual observation: No   Use of portable oxygen: No   Evidence of cyanosis: No     Musculoskeletal:   Evidence of significant bone deformities: No   Spinal curvature: No     Integumentary:    Obvious bruising: No   Lacerations or discoloration on casual observation: No       Neurological Examination:   Mental Status:        MMSE  No flowsheet data found. Formal testing was not completed    there was nothing concerning on general observation and discussion.    Alert oriented and appropriate to general conversation  Normal processing on general observation  Followed conversation and responded seemingly appropriate throughout the office visit  No word finding difficulties noted on casual observation  Able to follow directions without difficulty     Cranial Nerves:    Grossly intact    Motor:   Normal bulk  No tremor appreciated on today's exam  No abnormal movements appreciated on today's exam  Moves extremities spontaneously and with purpose      Sensation: Not tested    Coordination/Cerebellar:   Grossly intact    Gait: Not tested    Fall risk assessment  No flowsheet data found. Due to this being a TeleHealth evaluation, many elements of the physical examination are unable to be assessed. Pursuant to the emergency declaration under the [address] [place] [address] waiver authority and the [employer] Act, this Virtual  Visit was conducted, with patient's consent, to reduce the patient's risk of exposure to COVID-19 and provide continuity of care for an established patient. Services were provided through a video synchronous discussion virtually to substitute for in-person clinic visit.           Cassie Martines MS, ANP-BC, [facility]

## 2022-01-11 NOTE — PATIENT INSTRUCTIONS
As we discussed    First am glad you are doing so much better that is awesome :-)    Second, it is possible that you are actually having more of an inflammatory response due to a pinched nerve causing the right arm symptoms versus a true MS exacerbation as I cannot discern any real changes on MRI scan related to [address] so that actually is good news    We will continue to monitor this over time but for now I think staying on your branded Tecfidera is appropriate once that funding runs out, if it runs out, then we will switch you to generic Tecfidera    You are due for blood work in March we will mail out the lab slips to you and you can just get it completed at any [facility] sometime in March 2022        Office Policies    o Phone calls/patient messages:  Please allow up to 24 hours for someone in the office to contact you about your call or message. Be mindful your provider may be out of the office or your message may require further review. We encourage you to use [employer] for your messages as this is a faster, more efficient way to communicate with our office    o Medication Refills:  Prescription medications require up to 48 business hours to process. We encourage you to use [employer] for your refills. For controlled medications: Please allow up to 72 business hours to process. Certain medications may require you to  a written prescription at our office. NO narcotic/controlled medications will be prescribed after 4pm Monday through Friday or on weekends    o Form/Paperwork Completion:  We ask that you allow 7-14 business days. You may also download your forms to [employer] to have your doctor print off.

## 2022-01-12 ENCOUNTER — APPOINTMENT (OUTPATIENT)
Dept: INFUSION THERAPY | Age: 47
End: 2022-01-12

## 2022-03-19 PROBLEM — G56.02 CARPAL TUNNEL SYNDROME OF LEFT WRIST: Status: ACTIVE | Noted: 2021-11-30

## 2022-03-19 PROBLEM — R53.83 FATIGUE: Status: ACTIVE | Noted: 2021-11-30

## 2022-03-19 PROBLEM — G35 MULTIPLE SCLEROSIS (HCC): Status: ACTIVE | Noted: 2021-11-30

## 2022-03-19 PROBLEM — G35 MULTIPLE SCLEROSIS EXACERBATION (HCC): Status: ACTIVE | Noted: 2022-01-11

## 2022-03-19 PROBLEM — E04.1 THYROID NODULE: Status: ACTIVE | Noted: 2021-11-30

## 2022-03-19 PROBLEM — N31.9 NEUROGENIC BLADDER: Status: ACTIVE | Noted: 2022-01-11

## 2022-04-08 ENCOUNTER — OFFICE VISIT (OUTPATIENT)
Dept: INTERNAL MEDICINE CLINIC | Age: 47
End: 2022-04-08
Payer: COMMERCIAL

## 2022-04-08 VITALS
TEMPERATURE: 98.6 F | DIASTOLIC BLOOD PRESSURE: 79 MMHG | WEIGHT: 146.9 LBS | RESPIRATION RATE: 13 BRPM | BODY MASS INDEX: 27.03 KG/M2 | SYSTOLIC BLOOD PRESSURE: 133 MMHG | HEIGHT: 62 IN | HEART RATE: 91 BPM

## 2022-04-08 DIAGNOSIS — G35 MULTIPLE SCLEROSIS (HCC): ICD-10-CM

## 2022-04-08 DIAGNOSIS — N91.2 AMENORRHEA: Primary | ICD-10-CM

## 2022-04-08 DIAGNOSIS — E04.2 MULTINODULAR GOITER: ICD-10-CM

## 2022-04-08 DIAGNOSIS — Z51.81 THERAPEUTIC DRUG MONITORING: ICD-10-CM

## 2022-04-08 PROCEDURE — 99213 OFFICE O/P EST LOW 20 MIN: CPT | Performed by: FAMILY MEDICINE

## 2022-04-08 NOTE — PROGRESS NOTES
Rm    Chief Complaint   Patient presents with    Irregular Menses     started about 1 month ago        Visit Vitals  /79 (BP 1 Location: Left upper arm, BP Patient Position: Sitting, BP Cuff Size: Adult)   Pulse 91   Temp 98.6 °F (37 °C) (Oral)   Resp 13   Ht 5' 2\" (1.575 m)   Wt 146 lb 14.4 oz (66.6 kg)   LMP 03/12/2022   BMI 26.87 kg/m²        1. Have you been to the ER, urgent care clinic since your last visit? Hospitalized since your last visit? No    2. Have you seen or consulted any other health care providers outside of the [address] [place] [address] since your last visit? Include any pap smears or colon screening. No     Health Maintenance Due   Topic Date Due    Hepatitis C Screening  Never done    DTaP/Tdap/Td series (1 - Tdap) Never done    Colorectal Cancer Screening Combo  Never done    COVID-19 Vaccine (3 - Booster for Pfizer series) 11/17/2021        3 most recent PHQ Screens 11/30/2021   Little interest or pleasure in doing things Not at all   Feeling down, depressed, irritable, or hopeless Not at all   Total Score PHQ 2 0        No flowsheet data found.     Learning Assessment 10/8/2019   PRIMARY LEARNER Patient   HIGHEST LEVEL OF EDUCATION - PRIMARY LEARNER  -   BARRIERS PRIMARY LEARNER -   CO-LEARNER CAREGIVER -   PRIMARY LANGUAGE ENGLISH    NEED -   LEARNER PREFERENCE PRIMARY DEMONSTRATION   LEARNING SPECIAL TOPICS -   ANSWERED BY self   RELATIONSHIP SELF

## 2022-04-08 NOTE — PROGRESS NOTES
SPORTS MEDICINE AND PRIMARY CARE  Jose F Baum. MD Ch[place]  [address] [address]    Chief Complaint   Patient presents with    Irregular Menses     started about 1 month ago       SUBJECTIVE:    Nita Glass is a 55 y.o. female for evaluation of amenorrhea. Patient is taking Junel FE for years to treat acne and prevent conception. Last normal menstrual period was in January. Patient had no bleeding in February. She has been compliant with OCP therapy. She should begin menstruation tomorrow according to her pill pack. Sexually active. Desires children. Wants her hormone levels checked to check menopause status. Nodular goiter in the past  Denies neck pain or pressure  Followed with serial ultrasounds      Current Outpatient Medications   Medication Sig Dispense Refill    Tecfidera 240 mg cpDR Take 240 mg by mouth two (2) times a day. Brand-name only do not dispense generic  Indications: relapsing form of multiple sclerosis 60 Capsule 11    loratadine-pseudoephedrine (Claritin-D 12 Hour) 5-120 mg per tablet Take 1 Tablet by mouth daily as needed.  norethindrone-ethinyl estradiol-iron (Junel FE 1.5/30, 28,) 1.5 mg-30 mcg (21)/75 mg (7) tab Take 1 Tablet by mouth daily. 3 Package 3    ferrous sulfate (IRON PO) Take  by mouth.  docusate sodium (STOOL SOFTENER PO) Take  by mouth.  Biotin 2,500 mcg cap Take 10,000 mcg by mouth daily.  Takes 10,000       Past Medical History:   Diagnosis Date    Ill-defined condition     uterine fibroids    Multiple sclerosis (HCC)     Other ill-defined conditions(799.89)     sleep disorder and breast fibroids    Other ill-defined conditions(799.89)     left arm numbness at times    Unspecified breast disorder      Past Surgical History:   Procedure Laterality Date    HX GYN      fibroids    HX HEENT      tooth extraction    HX OTHER SURGICAL  2015    laparoscopoy     IN ABDOMEN SURGERY PROC UNLISTED      myomectomy     No Known Allergies    REVIEW OF SYSTEMS:  General: negative for - chills or fever  ENT: negative for - headaches, nasal congestion, tinnitus, hearing loss, vision changes, sore throat  Respiratory: negative for - cough, hemoptysis, shortness of breath or wheezing  Cardiovascular : negative for - chest pain, edema, palpitations or shortness of breath  Gastrointestinal: negative for - abdominal pain, blood in stools, heartburn or nausea/vomiting, diarrhea, constipation  Genito-Urinary: no dysuria, trouble voiding, hematuria or erectile dysfunction  Musculoskeletal: negative for - gait disturbance, joint pain, joint stiffness , joint swelling, muscle aches  Neurological: no TIA or stroke symptoms  Hematologic: no bruises, no bleeding, no swollen glands  Integument: no lumps, mole changes, nail changes or rash  Endocrine:no malaise/lethargy or unexpected weight changes      Social History     Socioeconomic History    Marital status: SINGLE   Tobacco Use    Smoking status: Passive Smoke Exposure - Never Smoker    Smokeless tobacco: Never Used   Vaping Use    Vaping Use: Never used   Substance and Sexual Activity    Alcohol use: Not Currently     Comment: occasionally    Drug use: Yes     Types: Marijuana    Sexual activity: Not Currently     Partners: Male     Birth control/protection: Pill, Condom     Family History   Problem Relation Age of Onset    Bleeding Prob Mother     Endometriosis Mother     Cancer Maternal Grandmother         lung    Hypertension Maternal Grandfather     Stroke Maternal Grandfather        OBJECTIVE:     Visit Vitals  /79 (BP 1 Location: Left upper arm, BP Patient Position: Sitting, BP Cuff Size: Adult)   Pulse 91   Temp 98.6 °F (37 °C) (Oral)   Resp 13   Ht 5' 2\" (1.575 m)   Wt 146 lb 14.4 oz (66.6 kg)   LMP 03/12/2022   BMI 26.87 kg/m²     CONSTITUTIONAL: Well-appearing  EYES: eom intact  ENMT:moist mucous membranes,   NECK: supple.  Thyroid normal  RESPIRATORY: Chest: clear bilaterally  CARDIOVASCULAR: Heart: regular rate and rhythm  GASTROINTESTINAL: Abdomen: soft,  MUSCULOSKELETAL: Extremities: no edema  INTEGUMENT: Warm and dry. Normal turgor  MENTAL STATUS: alert and oriented, appropriate affect       ASSESSMENT/PLAN:  1. Amenorrhea    2. Multinodular goiter    3. Multiple sclerosis ([address]    4. Therapeutic drug monitoring      Amenorrhea and long-term junel use are likely connected. Check labs. Patient reassured and aware that OCPs will affect many of her hormone levels. Multinodular goiter with stable symptoms. Continue surveillance  Multiple sclerosis stable. Followed by neurology. .  Orders Placed This Encounter    [facility]    PROLACTIN    HCG QL SERUM    PROGESTERONE    SAMPLES BEING HELD           I have discussed the diagnosis with the patient and the intended plan as seen in the  orders above. The patient understands and agrees with the plan. The patient has   received an after visit summary. Questions were answered concerning  future plans  Patient labs and/or xrays were reviewed as available. Past records were reviewed as available. Counseled regarding diet, exercise and healthy lifestyle          Advised patient to  call back or return to office if symptoms develop/worsen/change/persist.  Discussed expected course/resolution/complications of diagnosis in detail with patient. Maxwell Lopez M.D. This note was created using voice recognition software.   Edits have been made but syntax errors might exist.

## 2022-04-09 LAB
COMMENT, HOLDF: NORMAL
FSH SERPL-ACNC: 2.5 MIU/ML
HCG SERPL QL: NEGATIVE
LH SERPL-ACNC: 1.2 MIU/ML
PROLACTIN SERPL-MCNC: 7.5 NG/ML
SAMPLES BEING HELD,HOLD: NORMAL

## 2022-04-11 LAB — PROGEST SERPL-MCNC: <0.1 NG/ML

## 2022-04-12 DIAGNOSIS — N91.2 AMENORRHEA: Primary | ICD-10-CM

## 2022-05-26 DIAGNOSIS — G35 MULTIPLE SCLEROSIS (HCC): Primary | ICD-10-CM

## 2022-05-26 RX ORDER — DIMETHYL FUMARATE 240 MG/1
240 CAPSULE ORAL 2 TIMES DAILY
Qty: 60 CAPSULE | Refills: 11 | Status: SHIPPED | OUTPATIENT
Start: 2022-05-26

## 2022-05-26 NOTE — PROGRESS NOTES
Sent prescription for generic Tecfidera to patient's pharmacy: [place] [facility] has patient no longer has patient assistance to help with co-pay for [employer] since [address] is now generic and Yanira Kern will not continue to support their product

## 2022-06-01 ENCOUNTER — TELEPHONE (OUTPATIENT)
Dept: NEUROLOGY | Age: 47
End: 2022-06-01

## 2022-06-01 NOTE — TELEPHONE ENCOUNTER
Needs clarification on the dymethyl fumerate. Pt has been getting name brand tecfidera, New script was for generic. Is [facility] authorizing generic or should it be name brand.  Brett Corrales is requiring a call for approval. [phone]

## 2022-06-06 NOTE — TELEPHONE ENCOUNTER
Confirmed with [facility] pharmacy and spoke with Rodney Goldsmith the pharmacist and advised that the patient is no longer getting assistance and to supply with the generic dimethyl fumarate

## 2022-06-06 NOTE — TELEPHONE ENCOUNTER
There should be a note in the chart that I specifically sent generic formulation because she no longer gets patient's assistance from the pharmaceutical agency

## 2022-07-15 ENCOUNTER — VIRTUAL VISIT (OUTPATIENT)
Dept: NEUROLOGY | Age: 47
End: 2022-07-15
Payer: COMMERCIAL

## 2022-07-15 DIAGNOSIS — G35 MULTIPLE SCLEROSIS (HCC): Primary | ICD-10-CM

## 2022-07-15 DIAGNOSIS — G35 MULTIPLE SCLEROSIS EXACERBATION (HCC): ICD-10-CM

## 2022-07-15 DIAGNOSIS — M48.02 DEGENERATIVE CERVICAL SPINAL STENOSIS: ICD-10-CM

## 2022-07-15 DIAGNOSIS — N31.9 NEUROGENIC BLADDER: ICD-10-CM

## 2022-07-15 DIAGNOSIS — R53.83 FATIGUE, UNSPECIFIED TYPE: ICD-10-CM

## 2022-07-15 DIAGNOSIS — Z79.899 HIGH RISK MEDICATION USE: ICD-10-CM

## 2022-07-15 DIAGNOSIS — Z51.81 THERAPEUTIC DRUG MONITORING: ICD-10-CM

## 2022-07-15 DIAGNOSIS — E04.1 THYROID NODULE: ICD-10-CM

## 2022-07-15 PROCEDURE — 99215 OFFICE O/P EST HI 40 MIN: CPT | Performed by: PSYCHIATRY & NEUROLOGY

## 2022-07-15 RX ORDER — NORGESTIMATE AND ETHINYL ESTRADIOL 0.25-0.035
KIT ORAL
COMMUNITY
Start: 2022-05-04

## 2022-07-15 NOTE — PROGRESS NOTES
[facility]  TELEHEALTH Virtual FOLLOW-UP VISIT        Pedro Ahuja is a 55 y.o. female who was seen by synchronous (real-time) audio-video technology on 7/15/2022. Pedro Ahuja is a 55 y.o. female who presents today for the following:  Chief Complaint   Patient presents with    Follow-up     follow up on ms.  states that everything is going great         ASSESSMENT AND PLAN      1. Multiple sclerosis ([address]  Assessment & Plan:  Continue generic tecfidera  Seemingly tolerated well without side effects  Clinical and radiographically stable    This is a high risk drug and therapeutic drug monitoring is necessary she does have active orders from January 2022 still on the chart I have asked her to get that blood work completed  2. Multiple sclerosis exacerbation ([address]  Assessment & Plan:  I suspect the exacerbation from October 2021 was more related to arthritic stenosis changes noted on cervical MRI scan which would be consistent with her symptoms    I do not believe it was a true MS flareup  The IV steroids probably help reduce the inflammation on the nerves causing the radiculopathy      3. Fatigue, unspecified type  Assessment & Plan:   Managed behaviorally at this time and doing well  We will continue to monitor over time  4. Neurogenic bladder  Assessment & Plan:   Stable which is behavioral interventions at this time we will continue to monitor over time  5. Thyroid nodule  Comments:  Per patient report: Evaluated by ENT felt to be benign no further work-up  Assessment & Plan:     6. Degenerative cervical spinal stenosis  Assessment & Plan: With radicular symptoms into the right arm  Responded well to IV steroids  Now just intermittent and not bothersome  7. High risk medication use  8. Therapeutic drug monitoring      Patient and/or family was given time to ask questions and voice concerns.  I believe all questions concerns were adequately addressed at this  office visit. Patient and/or family also verbalized agreement and understanding of the above-stated plan    Patient remains a complex patient secondary to polypharmacy, significant comorbid conditions, and use of high-risk medications which complicate the decision making process related to patient's neurologic diagnosis    Follow-up and Dispositions    · Return in about 6 months (around 1/15/2023) for Virtual visit. ICD-10-CM ICD-9-CM    1. Multiple sclerosis ([facility] [address]  G35 340    2. Multiple sclerosis exacerbation ([address] [facility] [address]  G35 340    3. Fatigue, unspecified type  R53.83 780.79    4. Neurogenic bladder  N31.9 596.54    5. Thyroid nodule  E04.1 241.0     Per patient report: Evaluated by ENT felt to be benign no further work-up   6. Degenerative cervical spinal stenosis  M48.02 723.0    7. High risk medication use  Z79.899 V58.69    8. Therapeutic drug monitoring  Z51.81 V58.83          I attest that 40 minutes was spent on today's visit reviewing medical records and diagnostic testing deemed pertinent to this patient's care, along with direct time spent at patient's visit including the history, physical assessment and plan, discussing diagnosis and management along with documentation.       HPI  Historical Data  Patient is known to the practice and was previously seen by Dr. Tonia Leo    Neurologic diagnosis  Multiple sclerosis   Diagnosed November 2016    Presented with sudden Left leg weakness and fatigue    January 2016: Diplopia     Symptom onset: Age of 29 left-sided numbness      Other significant comorbid conditions/concerns  Carpal tunnel syndrome      Interim Data:   Multiple sclerosis  Current DMT  Tecfidera 240 mg twice daily using generic    No issues/SE reported    Past DMT  Copaxone: Side effects  Rebif    Paresthsia  Rt sided paresthesia: new as of October 10, 2021   Patient was worried it was an exacerbation we did treat her with IV Solu-Medrol MRI scans however did not show any change whatsoever but it does did show moderate right-sided foraminal stenosis which may be contributory to her symptoms  OV 7/15/22: Now infrequent and intermittent    Weakness:  Rt hand weakness   Resolved    Bladder   No incontinence but severe urgency new as of October 2021   Now only mild urgency and patient manages this with behavioral modification    Fatigue   Uses ice vest and intervenes in other ways so at this point well managed per pt report    Denies changes in the following:  Vision  Hearing  Bowel function  Cognition  Mood  Fatigue     reviewed the results of MRI scans with the patient at today's office visit I had center information briefly about the results via [address]    Other  Thyroid nodule  Patient seen by ENT per patient report and states that it is benign and needs no further follow-up      Pertinent diagnostic data    Results from [place] encounter on 12/21/21    MRI [facility] SPINE W WO CONT    Impression  1. Unchanged cervical spinal cord chronic demyelinating disease. Decreased  thoracic spinal cord chronic demyelinating disease. No new or acute  demyelination in the cervical or thoracic spinal cord. 2. C4-C5 moderate right foraminal stenosis is increased. 3. C6-C7 mild right foraminal stenosis is new. MRI [facility] SPINE W WO CONT    Impression  1. Unchanged cervical spinal cord chronic demyelinating disease. Decreased  thoracic spinal cord chronic demyelinating disease. No new or acute  demyelination in the cervical or thoracic spinal cord. 2. C4-C5 moderate right foraminal stenosis is increased. 3. C6-C7 mild right foraminal stenosis is new. Results from [place] encounter on 11/21/21    MRI BRAIN W WO CONT    Impression  Stable burden of white matter disease without acute findings or  enhancing  lesion to suggest active demyelination by this technique at this  time. No visits with results within 3 Month(s) from this visit.    Latest known visit with results is:   Office Visit on 04/08/2022   Component Date Value Ref Range Status    Progesterone 04/08/2022 <0.1  ng/mL Final    Comment: (NOTE)                      Follicular phase       0.1 -   0.9                      Luteal phase           1.8 -  23.9                      Ovulation phase        0.1 -  12.0                      Pregnant                         First trimester    11.0 -  44.3                         Second trimester   25.4 -  83.3                         Third trimester    58.7 - 214.0                      Postmenopausal         0.0 -   0.1  Performed At: [address] [id number]  Vandana Jimenez MD YF:[id number]      HCG, Ql. 04/08/2022 Negative  Negative   Final    Comment: The serum pregnancy test becomes positive at about the time of implantation of  the conceptus and approximates a quantitative bHCG value of >5 mIU/ML.       Prolactin 04/08/2022 7.5  ng/mL Final    Comment: Non pregnant      2.8-29.2    ng/mL Pregnant          9.7-208.5   ng/mL  Postmenopausal    1.8-20.3    ng/mL      [facility] 04/08/2022 2.5  mIU/mL Final    Comment: (NOTE)  FEMALES:   0-12 YR:  No reference range established   Pregnant: No reference range established  Non Pregnant:   Follicular Phase: 6.9-59.0  Mid-cycle peak: 5.2-17.5  Luteal Phase: 1.7-9.5  Post-menopausal on Menopausal Hormone Therapy (MHT): 5.9-72.8  Post-menopausal not on MHT: 12.7-132.2    MALES:  0-12 YR:   No reference range established  >12 YR:   0.7-10.8        Luteinizing hormone 04/08/2022 1.2  mIU/mL Final    Comment: (NOTE)  FEMALES:  0-12 YR: No reference range established  Pregnant: No reference range established  Non Pregnant:  Follicular Phase: 4.7-94.3  Mid-cycle peak: 22.8-76.1  Luteal Phase: 0.6-13.5  Post-menopausal on Menopausal Hormone Therapy (MHT): 1.1-52.4  Post-menopausal Not on MHT: 8.6-61.8    MALES:  0-12 YR: No reference range established  >12 YR:  1.2-10.6      SAMPLES BEING HELD 04/08/2022 1LAV   Final    COMMENT 04/08/2022 Add-on orders for these samples will be processed based on acceptable specimen integrity and analyte stability, which may vary by analyte. Final         No Known Allergies    Current Outpatient Medications   Medication Sig    norgestimate-ethinyl estradioL (Kelly) 0.25-35 mg-mcg tab     dimethyl fumarate DR (TECFIDERA) 240 mg capsule Take 1 Capsule by mouth two (2) times a day. Indications: relapsing form of multiple sclerosis    loratadine-pseudoephedrine (Claritin-D 12 Hour) 5-120 mg per tablet Take 1 Tablet by mouth daily as needed.  ferrous sulfate (IRON PO) Take  by mouth.  docusate sodium (STOOL SOFTENER PO) Take  by mouth.  Biotin 2,500 mcg cap Take 10,000 mcg by mouth daily. Takes 10,000    norethindrone-ethinyl estradiol-iron (Junel FE 1.5/30, 28,) 1.5 mg-30 mcg (21)/75 mg (7) tab Take 1 Tablet by mouth daily. No current facility-administered medications for this visit. Past medical history/surgical history, family history, and social history have been reviewed for today's visit      ROS    A ten system review of constitutional, cardiovascular, respiratory, musculoskeletal, endocrine, skin, SHEENT, genitourinary, psychiatric and neurologic systems was obtained and is unremarkable except as mentioned under HPI          EXAMINATION: Within the context of virtual telehealth visit:    General appearance: Patient is well-developed and well-nourished in no apparent distress and well groomed.     Psych/mental health:  Affect: Appropriate    PHQ  3 most recent PHQ Screens 7/15/2022   Little interest or pleasure in doing things Not at all   Feeling down, depressed, irritable, or hopeless Not at all   Total Score PHQ 2 0           HEENT:   Normocephalic  With evidence of trauma: No  Full range of motion head neck: Yes  Tenderness to palpation of the head neck region: N/A      Cardiovascular:     Extremities warm to touch: N/A  Extremity swelling: No  Discoloration: No  Evidence of PVD: No    Respiratory:   Dyspnea on exertion: No   Abnormal effort on casual observation: No   Use of portable oxygen: No   Evidence of cyanosis: No     Musculoskeletal:   Evidence of significant bone deformities: No   Spinal curvature: No     Integumentary:    Obvious bruising: No   Lacerations or discoloration on casual observation: No       Neurological Examination:   Mental Status:        MMSE  No flowsheet data found. Formal testing was not completed    there was nothing concerning on general observation and discussion. Alert oriented and appropriate to general conversation  Normal processing on general observation  Followed conversation and responded seemingly appropriate throughout the office visit  No word finding difficulties noted on casual observation  Able to follow directions without difficulty     Cranial Nerves:    Grossly intact    Motor:   Normal bulk  No tremor appreciated on today's exam  No abnormal movements appreciated on today's exam  Moves extremities spontaneously and with purpose      Sensation: Not tested    Coordination/Cerebellar:   Grossly intact    Gait: Not tested    Fall risk assessment  No flowsheet data found. Due to this being a TeleHealth evaluation, many elements of the physical examination are unable to be assessed. Pursuant to the emergency declaration under the [facility] Count includes the [facility] waiver authority and the [employer] Act, this Virtual  Visit was conducted, with patient's consent, to reduce the patient's risk of exposure to COVID-19 and provide continuity of care for an established patient. Services were provided through a video synchronous discussion virtually to substitute for in-person clinic visit.           Christin Madrid MS, ANP-BC, [facility]

## 2022-07-15 NOTE — ASSESSMENT & PLAN NOTE
Continue generic tecfidera  Seemingly tolerated well without side effects  Clinical and radiographically stable    This is a high risk drug and therapeutic drug monitoring is necessary she does have active orders from January 2022 still on the chart I have asked her to get that blood work completed

## 2022-07-15 NOTE — ASSESSMENT & PLAN NOTE
With radicular symptoms into the right arm  Responded well to IV steroids  Now just intermittent and not bothersome

## 2022-07-15 NOTE — ASSESSMENT & PLAN NOTE
----- Message from Yesenia Daniel Stable which is behavioral interventions at this time we will continue to monitor over time

## 2022-07-15 NOTE — PATIENT INSTRUCTIONS
As per our discussion    You are doing really well continue all your activity as tolerated I like how you are handling the heat you are doing everything correctly make sure you stay well-hydrated as well    Continue on the generic Tecfidera and the only thing I need for you is to get the blood work done that I ordered back in January they are still active and on file that I can see in the computer so just make an appointment at [facility] to get those done and we will also send out a copy of those orders for you in case [facility] gives you any problems. Have a great rest your summer at [place] blessed holiday season and a happy new year      Office Policies      o Appointments  Please make sure that you arrive for your next appointment at least 15 minutes prior to your appointment time. If for some reason you are going to be late please notify the office to determine if you need to be rescheduled or we can adjust your appointment time      o Phone calls/patient messages:  Please allow up to 24 hours for someone in the office to contact you about your call or message. Be mindful your provider may be out of the office or your message may require further review. We encourage you to use [employer] for your messages as this is a faster, more efficient way to communicate with our office    o Medication Refills:  Prescription medications require up to 48 business hours to process. We encourage you to use [employer] for your refills. For controlled medications: Please allow up to 72 business hours to process. Certain medications may require you to  a written prescription at our office. NO narcotic/controlled medications will be prescribed after 4pm Monday through Friday or on weekends    o Form/Paperwork Completion:  We ask that you allow 7-14 business days. You may also download your forms to [employer] to have your doctor print off.

## 2022-07-15 NOTE — ASSESSMENT & PLAN NOTE
I suspect the exacerbation from October 2021 was more related to arthritic stenosis changes noted on cervical MRI scan which would be consistent with her symptoms    I do not believe it was a true MS flareup  The IV steroids probably help reduce the inflammation on the nerves causing the radiculopathy

## 2022-08-24 LAB
INDEX VALUE: 0.26
INTERPRETATION: NORMAL
INTERPRETATION: NORMAL
JCPYV AB SERPL QL IA: NORMAL
JCV AB BY INHIBITION: NEGATIVE

## 2023-01-16 ENCOUNTER — VIRTUAL VISIT (OUTPATIENT)
Dept: NEUROLOGY | Age: 48
End: 2023-01-16
Payer: COMMERCIAL

## 2023-01-16 DIAGNOSIS — R53.83 OTHER FATIGUE: ICD-10-CM

## 2023-01-16 DIAGNOSIS — E55.9 VITAMIN D DEFICIENCY: ICD-10-CM

## 2023-01-16 DIAGNOSIS — M48.02 DEGENERATIVE CERVICAL SPINAL STENOSIS: ICD-10-CM

## 2023-01-16 DIAGNOSIS — N31.9 NEUROGENIC BLADDER: ICD-10-CM

## 2023-01-16 DIAGNOSIS — G35 MULTIPLE SCLEROSIS EXACERBATION (HCC): ICD-10-CM

## 2023-01-16 DIAGNOSIS — G35 MULTIPLE SCLEROSIS (HCC): Primary | ICD-10-CM

## 2023-01-16 DIAGNOSIS — E04.1 THYROID NODULE: ICD-10-CM

## 2023-01-16 DIAGNOSIS — Z11.59 ENCOUNTER FOR SCREENING FOR OTHER VIRAL DISEASES: ICD-10-CM

## 2023-01-16 PROCEDURE — 99215 OFFICE O/P EST HI 40 MIN: CPT | Performed by: PSYCHIATRY & NEUROLOGY

## 2023-01-16 RX ORDER — BIOTIN 1000 MCG
1000 TABLET,CHEWABLE ORAL DAILY
COMMUNITY

## 2023-01-16 NOTE — PROGRESS NOTES
[facility]  TELEHEALTH Virtual FOLLOW-UP VISIT        Giovany Marino is a 52 y.o. female who was seen by synchronous (real-time) audio-video technology on 1/16/2023. Giovany Marino is a 52 y.o. female who presents today for the following:  Chief Complaint   Patient presents with    Follow-up     Follow up on MS and everything is great. Please send link to [phone] [facility] [address] for acute medications. ASSESSMENT AND PLAN      1. Multiple sclerosis ([address]  2. Multiple sclerosis exacerbation (HCC)  3. Other fatigue  4. Neurogenic bladder  5. Degenerative cervical spinal stenosis  6. Thyroid nodule      Patient and/or family was given time to ask questions and voice concerns. I believe all questions concerns were adequately addressed at this  office visit. Patient and/or family also verbalized agreement and understanding of the above-stated plan    Complex neurologic decision making secondary any or all of the following to include unclear etiology, and /or polypharmacy, and/or significant comorbid conditions, and/or use of high-risk medications which complicate the decision making process related to patient's neurologic diagnosis            ICD-10-CM ICD-9-CM    1. Multiple sclerosis ([address]  G35 340       2. Multiple sclerosis exacerbation ([address]  G35 340       3. Other fatigue  R53.83 780.79       4. Neurogenic bladder  N31.9 596.54       5. Degenerative cervical spinal stenosis  M48.02 723.0       6. Thyroid nodule  E04.1 241.0               I attest that 40 minutes was spent on today's visit reviewing medical records and diagnostic testing deemed pertinent to this patient's care, along with direct time spent at patient's visit including the history, physical assessment and plan, discussing diagnosis and management along with documentation.       HPI  Historical Data  Patient is known to the practice and was previously seen by Dr. Pancho Contreras    Neurologic diagnosis  Multiple sclerosis   Diagnosed November 2016    Presented with sudden Left leg weakness and fatigue    January 2016: Diplopia     Symptom onset: Age of 29 left-sided numbness      Other significant comorbid conditions/concerns  Carpal tunnel syndrome      Interim Data:   Multiple sclerosis  Current DMT  Tecfidera 240 mg twice daily using generic    No issues/SE reported   Patient does report some difficulty in remembering to take this twice daily usually missing the evening dose once or twice a week    Past DMT  Copaxone: Side effects  Rebif    MS exacerbation history/hospitalizations/progression  Rt sided paresthesia: new as of October 10, 2021   Patient was worried it was an exacerbation we did treat her with IV Solu-Medrol MRI scans however did not show any change whatsoever but it does  did show moderate right-sided foraminal stenosis which may be contributory to her symptoms    No further recurrence to date    Baseline MS symptoms: If no specific office visit date attached symptom is stable or unchanged from  [address] sided paresthesia: new as of October 10, 2021   Patient was worried it was an exacerbation we did treat her with IV Solu-Medrol MRI scans however did not show any change whatsoever but it does  did show moderate right-sided foraminal stenosis which may be contributory to her symptoms  OV 7/15/22: Now infrequent and intermittent  OV 1/16/2023: Resolved    Weakness:  Rt hand weakness   Resolved    Bladder   No incontinence but severe urgency new as of October 2021   Now only mild urgency and patient manages this with behavioral modification    Fatigue   Uses ice vest and intervenes in other ways so at this point well managed per pt report    Denies changes in the following:  Vision  Hearing  Bowel function  Cognition  Mood  Fatigue         Other  Thyroid nodule  Patient seen by ENT per patient report and states that it is benign and needs no further follow-up      Pertinent diagnostic data    JCV Ab:  INDEX VALUE   Date Value Ref Range Status   08/09/2022 0.26  Final       Results from Hospital Encounter encounter on 12/21/21    MRI [facility] SPINE W WO CONT    Impression  1. Unchanged cervical spinal cord chronic demyelinating disease. Decreased  thoracic spinal cord chronic demyelinating disease. No new or acute  demyelination in the cervical or thoracic spinal cord. 2. C4-C5 moderate right foraminal stenosis is increased. 3. C6-C7 mild right foraminal stenosis is new. MRI CERV SPINE W WO CONT    Impression  1. Unchanged cervical spinal cord chronic demyelinating disease. Decreased  thoracic spinal cord chronic demyelinating disease. No new or acute  demyelination in the cervical or thoracic spinal cord. 2. C4-C5 moderate right foraminal stenosis is increased. 3. C6-C7 mild right foraminal stenosis is new. Results from [place] encounter on 11/21/21    MRI BRAIN W WO CONT    Impression  Stable burden of white matter disease without acute findings or  enhancing  lesion to suggest active demyelination by this technique at this  time. No visits with results within 3 Month(s) from this visit.    Latest known visit with results is:   Office Visit on 04/08/2022   Component Date Value Ref Range Status    Progesterone 04/08/2022 <0.1  ng/mL Final    Comment: (NOTE)                      Follicular phase       0.1 -   0.9                      Luteal phase           1.8 -  23.9                      Ovulation phase        0.1 -  12.0                      Pregnant                         First trimester    11.0 -  44.3                         Second trimester   25.4 -  83.3                         Third trimester    58.7 - 214.0                      Postmenopausal         0.0 -   0.1  Performed At: [facility] & [address] [id number]  Eve Alcazar MD WP:[phone]      HCG, Ql. 04/08/2022 Negative  Negative   Final    Comment: The serum pregnancy test becomes positive at about the time of implantation of  the conceptus and approximates a quantitative bHCG value of >5 mIU/ML. Prolactin 04/08/2022 7.5  ng/mL Final    Comment: Non pregnant      2.8-29.2    ng/mL Pregnant          9.7-208.5   ng/mL  Postmenopausal    1.8-20.3    ng/mL      [facility] 04/08/2022 2.5  mIU/mL Final    Comment: (NOTE)  FEMALES:   0-12 YR:  No reference range established   Pregnant: No reference range established  Non Pregnant:   Follicular Phase: 5.9-22.7  Mid-cycle peak: 5.2-17.5  Luteal Phase: 1.7-9.5  Post-menopausal on Menopausal Hormone Therapy (MHT): 5.9-72.8  Post-menopausal not on MHT: 12.7-132.2    MALES:  0-12 YR:   No reference range established  >12 YR:   0.7-10.8        Luteinizing hormone 04/08/2022 1.2  mIU/mL Final    Comment: (NOTE)  FEMALES:  0-12 YR: No reference range established  Pregnant: No reference range established  Non Pregnant:  Follicular Phase: 9.2-60.5  Mid-cycle peak: 22.8-76.1  Luteal Phase: 0.6-13.5  Post-menopausal on Menopausal Hormone Therapy (MHT): 1.1-52.4  Post-menopausal Not on MHT: 8.6-61.8    MALES:  0-12 YR: No reference range established  >12 YR:  1.2-10.6      SAMPLES BEING HELD 04/08/2022 1LAV   Final    COMMENT 04/08/2022 Add-on orders for these samples will be processed based on acceptable specimen integrity and analyte stability, which may vary by analyte. Final                 No Known Allergies    Current Outpatient Medications   Medication Sig    norgestimate-ethinyl estradioL (ORTHO-CYCLEN, SPRINTEC) 0.25-35 mg-mcg tab     dimethyl fumarate DR (TECFIDERA) 240 mg capsule Take 1 Capsule by mouth two (2) times a day. Indications: relapsing form of multiple sclerosis    loratadine-pseudoephedrine (Claritin-D 12 Hour) 5-120 mg per tablet Take 1 Tablet by mouth daily as needed. ferrous sulfate (IRON PO) Take  by mouth. docusate sodium (STOOL SOFTENER PO) Take  by mouth.     Biotin 2,500 mcg cap Take 10,000 mcg by mouth daily. Takes 10,000    norethindrone-ethinyl estradiol-iron (Junel FE 1.5/30, 28,) 1.5 mg-30 mcg (21)/75 mg (7) tab Take 1 Tablet by mouth daily. No current facility-administered medications for this visit. Past medical history/surgical history, family history, and social history have been reviewed for today's visit      ROS    A ten system review of constitutional, cardiovascular, respiratory, musculoskeletal, endocrine, skin, SHEENT, genitourinary, psychiatric and neurologic systems was obtained and is unremarkable except as mentioned under HPI          EXAMINATION: Within the context of virtual telehealth visit:    General appearance: Patient is well-developed and well-nourished in no apparent distress and well groomed. Psych/mental health:  Affect: Appropriate    PHQ  3 most recent PHQ Screens 1/16/2023   Little interest or pleasure in doing things Not at all   Feeling down, depressed, irritable, or hopeless Not at all   Total Score PHQ 2 0           HEENT:   Normocephalic  With evidence of trauma: No  Full range of motion head neck: Yes  Tenderness to palpation of the head neck region: N/A      Cardiovascular:     Extremities warm to touch: N/A  Extremity swelling: No  Discoloration: No  Evidence of PVD: No    Respiratory:   Dyspnea on exertion: No   Abnormal effort on casual observation: No   Use of portable oxygen: No   Evidence of cyanosis: No     Musculoskeletal:   Evidence of significant bone deformities: No   Spinal curvature: No     Integumentary:    Obvious bruising: No   Lacerations or discoloration on casual observation: No       Neurological Examination:   Mental Status:        MMSE  No flowsheet data found. Formal testing was not completed    there was nothing concerning on general observation and discussion.    Alert oriented and appropriate to general conversation  Normal processing on general observation  Followed conversation and responded seemingly appropriate throughout the office visit  No word finding difficulties noted on casual observation  Able to follow directions without difficulty     Cranial Nerves:    Grossly intact    Motor:   Normal bulk  No tremor appreciated on today's exam  No abnormal movements appreciated on today's exam  Moves extremities spontaneously and with purpose      Sensation: Not tested    Coordination/Cerebellar:   Grossly intact    Gait: Not tested    Fall risk assessment  No flowsheet data found. Due to this being a TeleHealth evaluation, many elements of the physical examination are unable to be assessed. Pursuant to the emergency declaration under the [address] [place] [address], [facility] waiver authority and the [place] and [employer] Act, this Virtual  Visit was conducted, with patient's consent, to reduce the patient's risk of exposure to COVID-19 and provide continuity of care for an established patient. Services were provided through a video synchronous discussion virtually to substitute for in-person clinic visit.           Naya Desai MS, ANP-BC, [facility]

## 2023-01-16 NOTE — ASSESSMENT & PLAN NOTE
Stable without recurrence last known exacerbation October 2022 presented with right arm numbness and reduced functional ability  MRI scan was was supportive of clinical presentation exacerbation

## 2023-01-16 NOTE — ASSESSMENT & PLAN NOTE
Continues on generic formulation of Tecfidera and doing well and without side effects  Patient remains clinically and radiographically stable    Patient does however attest to having difficulty with the twice daily dosing regarding adherence and this is not an uncommon issue  We discussed medication adherence techniques but at this remains a persistent problem we may need to look to switch her to a daily medication such as an S1 P or Aubagio    Therapeutic blood work has been ordered  Patient is not due for neuro imaging studies at this time

## 2023-01-16 NOTE — PATIENT INSTRUCTIONS
As per discussion    Overall you are doing well keep up the good work  Unfortunately Tecfidera is 1 of those medications that really does need to be taken twice a day  So I would recommend setting alarm on your phone for the evening dose and you can take it at dinnertime rather than at bedtime but do not turn off that alarm until you have taken your dose    Additionally use of a pillbox can be helpful so if you do turn out that alarm and then you are not sure whether you took the medication or not you be able to clearly see and you can take it as soon as you remember    We did briefly discussed the possibility of switching medications to once a day medication. This would be something like Gilenya or Aubagio but lets work on techniques to help improve adherence at this time as I would prefer not to switch you off of something that is working well for you    I have ordered lab work please get that done at a [place] [address] facility or [facility] facility this does need to be done every 6 months to make sure that were not running into trouble regarding medication effect with the Tecfidera and we do this with all of her drug modifying therapies    Continue to have a jac new year and I will see you in the summertime      Office Policies      Appointments  Please make sure that you arrive for your next appointment at least 15 minutes prior to your appointment time. If for some reason you are going to be late please notify the office to determine if you need to be rescheduled or we can adjust your appointment time      Phone calls/patient messages:  Please allow up to 24 hours for someone in the office to contact you about your call or message. Be mindful your provider may be out of the office or your message may require further review.  We encourage you to use [employer] for your messages as this is a faster, more efficient way to communicate with our office    Medication Refills:  Prescription medications require up to 48 business hours to process. We encourage you to use medidametrics for your refills. For controlled medications: Please allow up to 72 business hours to process. Certain medications may require you to  a written prescription at our office. NO narcotic/controlled medications will be prescribed after 4pm Monday through Friday or on weekends    Form/Paperwork Completion:  We ask that you allow 7-14 business days. You may also download your forms to [employer]idam[employer] to have your doctor print off.

## 2023-05-23 RX ORDER — DIMETHYL FUMARATE 240 MG/1
CAPSULE ORAL
Qty: 60 CAPSULE | Refills: 11 | Status: SHIPPED | OUTPATIENT
Start: 2023-05-23

## 2023-05-30 RX ORDER — DIMETHYL FUMARATE 240 MG/1
CAPSULE ORAL
Qty: 60 CAPSULE | Refills: 11 | Status: SHIPPED | OUTPATIENT
Start: 2023-05-30

## 2023-06-08 ENCOUNTER — TRANSCRIBE ORDERS (OUTPATIENT)
Facility: HOSPITAL | Age: 48
End: 2023-06-08

## 2023-06-08 DIAGNOSIS — Z12.31 VISIT FOR SCREENING MAMMOGRAM: Primary | ICD-10-CM

## 2023-07-17 ENCOUNTER — OFFICE VISIT (OUTPATIENT)
Age: 48
End: 2023-07-17
Payer: COMMERCIAL

## 2023-07-17 VITALS
SYSTOLIC BLOOD PRESSURE: 102 MMHG | WEIGHT: 145.6 LBS | BODY MASS INDEX: 26.79 KG/M2 | OXYGEN SATURATION: 99 % | TEMPERATURE: 97.6 F | HEIGHT: 62 IN | HEART RATE: 97 BPM | DIASTOLIC BLOOD PRESSURE: 68 MMHG | RESPIRATION RATE: 16 BRPM

## 2023-07-17 DIAGNOSIS — R53.83 OTHER FATIGUE: ICD-10-CM

## 2023-07-17 DIAGNOSIS — G35 MULTIPLE SCLEROSIS EXACERBATION (HCC): ICD-10-CM

## 2023-07-17 DIAGNOSIS — E04.1 THYROID NODULE: ICD-10-CM

## 2023-07-17 DIAGNOSIS — N31.9 NEUROGENIC BLADDER: ICD-10-CM

## 2023-07-17 DIAGNOSIS — M48.02 DEGENERATIVE CERVICAL SPINAL STENOSIS: ICD-10-CM

## 2023-07-17 DIAGNOSIS — G35 MULTIPLE SCLEROSIS (HCC): Primary | ICD-10-CM

## 2023-07-17 PROCEDURE — 99215 OFFICE O/P EST HI 40 MIN: CPT | Performed by: PSYCHIATRY & NEUROLOGY

## 2023-07-17 RX ORDER — ERGOCALCIFEROL 1.25 MG/1
50000 CAPSULE ORAL WEEKLY
COMMUNITY
End: 2023-07-17 | Stop reason: DRUGHIGH

## 2023-07-17 RX ORDER — TOLTERODINE 2 MG/1
2 CAPSULE, EXTENDED RELEASE ORAL DAILY
Qty: 30 CAPSULE | Refills: 3 | Status: SHIPPED | OUTPATIENT
Start: 2023-07-17

## 2023-07-17 RX ORDER — DOCUSATE SODIUM 100 MG/1
CAPSULE, LIQUID FILLED ORAL
COMMUNITY

## 2023-07-17 ASSESSMENT — PATIENT HEALTH QUESTIONNAIRE - PHQ9
2. FEELING DOWN, DEPRESSED OR HOPELESS: 0
SUM OF ALL RESPONSES TO PHQ QUESTIONS 1-9: 0
1. LITTLE INTEREST OR PLEASURE IN DOING THINGS: 0
SUM OF ALL RESPONSES TO PHQ QUESTIONS 1-9: 0
SUM OF ALL RESPONSES TO PHQ9 QUESTIONS 1 & 2: 0

## 2023-07-17 NOTE — ASSESSMENT & PLAN NOTE
Clinically stable  Continue on generic formulation of Tecfidera twice daily  Lab work which was just completed in June is all within normal limits we do not need to do anything differently at this time    She is however due for surveillance MRI scans.   In fact she is overdue for surveillance MRI scans and those have been ordered

## 2023-07-17 NOTE — ASSESSMENT & PLAN NOTE
Managing behaviorally at this time we will continue to monitor  Patient has been encouraged to continue to keep all of her other comorbid conditions under control such as anemia [she presently is maintained on iron]

## 2023-07-17 NOTE — ASSESSMENT & PLAN NOTE
Slowly worsening symptoms patient continues to do Kegels and other behavioral interventions.   We talked about reducing bladder irritants such as caffeine  And I will start her on low-dose Detrol 2 mg daily    If it continues to worsen or become more problematic we will need to refer her to urogyn  But I do not think we need to do it at this point    Check MRI scan of thoracic spine since patient has been having some progression regarding bladder issues at this time Peter Beverly is due for routine surveillance MRI scans anyway so this is already been ordered]

## 2023-07-17 NOTE — ASSESSMENT & PLAN NOTE
Stable since on Tecfidera last known exacerbation was October 2021  Continue on Tecfidera generic formulation at this time

## 2023-07-17 NOTE — PROGRESS NOTES
[address]  In-Office FOLLOW-UP VISIT            Aydin Magaña is a 52 y.o. female who presents today for the following:  Chief Complaint   Patient presents with    Follow-up     6 month follow up on MS. States that she has been having some incontinence. ASSESSMENT AND PLAN  1. Multiple sclerosis ([address])  Assessment & Plan:   Clinically stable  Continue on generic formulation of Tecfidera twice daily  Lab work which was just completed in June is all within normal limits we do not need to do anything differently at this time    She is however due for surveillance MRI scans. In fact she is overdue for surveillance MRI scans and those have been ordered  Orders:  -      [address]; Future  -     MRI CERVICAL SPINE W WO CONTRAST; Future  -     MRI BRAIN W WO CONTRAST; Future  2. Multiple sclerosis exacerbation ([address])  Comments:  Last event 2021  Assessment & Plan:   Stable since on Tecfidera last known exacerbation was October 2021  Continue on Tecfidera generic formulation at this time  3. Other fatigue  Assessment & Plan:   Managing behaviorally at this time we will continue to monitor  Patient has been encouraged to continue to keep all of her other comorbid conditions under control such as anemia [she presently is maintained on iron]  4. Neurogenic bladder  Assessment & Plan:   Slowly worsening symptoms patient continues to do Kegels and other behavioral interventions.   We talked about reducing bladder irritants such as caffeine  And I will start her on low-dose Detrol 2 mg daily    If it continues to worsen or become more problematic we will need to refer her to urogyn  But I do not think we need to do it at this point    Check MRI scan of thoracic spine since patient has been having some progression regarding bladder issues at this time Cholo Watson is due for routine surveillance MRI scans anyway so this is already been ordered]  Orders:  -

## 2023-07-17 NOTE — PATIENT INSTRUCTIONS
As per discussion overall you are doing really well I would not recommend any changes at this time. We are due we are actually overdue for your MRI surveillance scans so I got those ordered and as you can do them on different days if that is easier for you to break that up just let the  know that when they call you    You have already done your blood work so I do not need to order it at this point time    I hope you have a great rest your summer and happy early birthday! Office Policies    Phone calls/patient messages:  Please allow up to 24 hours for someone in the office to contact you about your call or message. Be mindful your provider may be out of the office or your message may require further review. We encourage you to use [employer] for your messages as this is a faster, more efficient way to communicate with our office    Medication Refills:  Prescription medications require up to 48 business hours to process. We encourage you to use [employer] for your refills. For controlled medications: Please allow up to 72 business hours to process. Certain medications may require you to  a written prescription at our office. NO narcotic/controlled medications will be prescribed after 4pm Monday through Friday or on weekends    Form/Paperwork Completion:  We ask that you allow 7-14 business days. You may also download your forms to [employer] to have your doctor print off.

## 2023-09-23 ENCOUNTER — HOSPITAL ENCOUNTER (OUTPATIENT)
Facility: HOSPITAL | Age: 48
End: 2023-09-23
Payer: COMMERCIAL

## 2023-09-23 DIAGNOSIS — G35 MULTIPLE SCLEROSIS (HCC): ICD-10-CM

## 2023-09-23 PROCEDURE — 6360000004 HC RX CONTRAST MEDICATION

## 2023-09-23 PROCEDURE — A9579 GAD-BASE MR CONTRAST NOS,1ML: HCPCS

## 2023-09-23 PROCEDURE — 70553 MRI BRAIN STEM W/O & W/DYE: CPT

## 2023-09-23 RX ADMIN — GADOTERIDOL 14 ML: 279.3 INJECTION, SOLUTION INTRAVENOUS at 10:26

## 2023-10-24 NOTE — TELEPHONE ENCOUNTER
Rustam/Marta called to check on revised start form for pt. Needs Dr. Yue Greene signature. Please fax it to [phone] att: Reema Franks. No heavy lifting/straining

## 2023-11-08 DIAGNOSIS — N31.9 NEUROGENIC BLADDER: ICD-10-CM

## 2023-11-10 RX ORDER — TOLTERODINE 2 MG/1
2 CAPSULE, EXTENDED RELEASE ORAL DAILY
Qty: 30 CAPSULE | Refills: 3 | Status: SHIPPED | OUTPATIENT
Start: 2023-11-10

## 2024-02-26 ENCOUNTER — HOSPITAL ENCOUNTER (EMERGENCY)
Facility: HOSPITAL | Age: 49
Discharge: HOME | End: 2024-02-26
Attending: STUDENT IN AN ORGANIZED HEALTH CARE EDUCATION/TRAINING PROGRAM
Payer: COMMERCIAL

## 2024-02-26 VITALS
TEMPERATURE: 99.1 F | RESPIRATION RATE: 16 BRPM | BODY MASS INDEX: 27.26 KG/M2 | HEART RATE: 92 BPM | SYSTOLIC BLOOD PRESSURE: 133 MMHG | WEIGHT: 148.15 LBS | OXYGEN SATURATION: 100 % | HEIGHT: 62 IN | DIASTOLIC BLOOD PRESSURE: 79 MMHG

## 2024-02-26 DIAGNOSIS — M79.604 RIGHT LEG PAIN: Primary | ICD-10-CM

## 2024-02-26 PROCEDURE — 99283 EMERGENCY DEPT VISIT LOW MDM: CPT

## 2024-02-26 RX ORDER — LIDOCAINE 50 MG/G
1 PATCH TOPICAL DAILY
Qty: 10 PATCH | Refills: 0 | Status: SHIPPED | OUTPATIENT
Start: 2024-02-26 | End: 2024-03-07

## 2024-02-26 RX ORDER — METHOCARBAMOL 750 MG/1
750 TABLET, FILM COATED ORAL 3 TIMES DAILY
Qty: 21 TABLET | Refills: 0 | Status: SHIPPED | OUTPATIENT
Start: 2024-02-26 | End: 2024-03-04

## 2024-02-26 ASSESSMENT — PAIN SCALES - GENERAL: PAINLEVEL_OUTOF10: 0

## 2024-02-26 NOTE — ED PROVIDER NOTES
follow up as discussed.  She also agrees with the care-plan and conveys that all of her questions have been answered.  I have also provided discharge instructions for her that include: educational information regarding their diagnosis and treatment, and list of reasons why they would want to return to the ED prior to their follow-up appointment, should her condition change.    PLAN:  1.      Medication List        ASK your doctor about these medications      Biotin 1000 MCG Chew     Claritin-D 12 Hour 5-120 MG per extended release tablet  Generic drug: loratadine-pseudoephedrine     dimethyl fumarate 240 MG delayed release capsule  Commonly known as: TECFIDERA  TAKE 1 CAPSULE BY MOUTH 2 TIMES A DAY     docusate sodium 100 MG capsule  Commonly known as: COLACE     FERROUS SULFATE IRON PO     norgestimate-ethinyl estradiol 0.25-35 MG-MCG per tablet  Commonly known as: ORTHO-CYCLEN     tolterodine 2 MG extended release capsule  Commonly known as: DETROL LA  TAKE 1 CAPSULE BY MOUTH DAILY     Vitamin D3 125 MCG (5000 UT) Tabs tablet  Generic drug: vitamin D3            2. Sunitha Macias MD  [facility] [address] [address] [phone]    In 1 week      [facility] EMERGENCY DEPT  [address] [facility] [address]  [phone]    As needed    Return to ED if worse     Diagnosis     Clinical Impression: muscle spasms, nerve impingement

## 2024-02-26 NOTE — DISCHARGE INSTRUCTIONS
You were seen today in the emergency department for leg pain.  This is most likely muscle spasms or a pinched nerve.  Please follow-up with your primary care provider, they may be able to refer you to physical therapy.  We are prescribing you 2 different medications, 1 is a muscle relaxer called Robaxin and the other lidocaine patches that you can put on the leg when you have pain.  Please return to the emergency department if you have any further concerns.

## 2024-02-26 NOTE — ED NOTES
Patient ambulatory out at this time. Patient has discharge paperwork in hand. Patient driving self home. Patient is A&Ox4, on RA, VSS, and is in NAD at the time of discharge.

## 2024-03-06 DIAGNOSIS — N31.9 NEUROGENIC BLADDER: ICD-10-CM

## 2024-03-06 NOTE — TELEPHONE ENCOUNTER
Last office visit 7/17/2023  Next office visit noticed that there are two 6 month follow up appointments for the patient in July.  I have reached out to see which appointment the patient would like to keep.  Both are with the same provider.   Last med refill 11/10/2023

## 2024-03-07 RX ORDER — TOLTERODINE 2 MG/1
2 CAPSULE, EXTENDED RELEASE ORAL DAILY
Qty: 90 CAPSULE | Refills: 3 | Status: SHIPPED | OUTPATIENT
Start: 2024-03-07

## 2024-04-21 ENCOUNTER — HOSPITAL ENCOUNTER (EMERGENCY)
Facility: HOSPITAL | Age: 49
Discharge: HOME | End: 2024-04-21
Payer: COMMERCIAL

## 2024-04-21 VITALS
SYSTOLIC BLOOD PRESSURE: 109 MMHG | OXYGEN SATURATION: 99 % | RESPIRATION RATE: 18 BRPM | WEIGHT: 149.91 LBS | DIASTOLIC BLOOD PRESSURE: 96 MMHG | TEMPERATURE: 98.6 F | HEART RATE: 100 BPM | BODY MASS INDEX: 27.42 KG/M2

## 2024-04-21 DIAGNOSIS — S16.1XXA CERVICAL STRAIN, ACUTE, INITIAL ENCOUNTER: ICD-10-CM
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
## 2024-04-21 DIAGNOSIS — M25.551 RIGHT HIP PAIN: ICD-10-CM

## 2024-04-21 PROCEDURE — 99283 EMERGENCY DEPT VISIT LOW MDM: CPT

## 2024-04-21 RX ORDER — CYCLOBENZAPRINE HCL 10 MG
10 TABLET ORAL 3 TIMES DAILY PRN
Qty: 21 TABLET | Refills: 0 | Status: SHIPPED | OUTPATIENT
Start: 2024-04-21 | End: 2024-05-01

## 2024-04-21 RX ORDER — IBUPROFEN 800 MG/1
800 TABLET ORAL EVERY 8 HOURS PRN
Qty: 30 TABLET | Refills: 0 | Status: SHIPPED | OUTPATIENT
Start: 2024-04-21 | End: 2024-05-01

## 2024-04-21 RX ORDER — LIDOCAINE 50 MG/G
1 PATCH TOPICAL DAILY
Qty: 10 PATCH | Refills: 0 | Status: SHIPPED | OUTPATIENT
Start: 2024-04-21 | End: 2024-05-01

## 2024-04-21 ASSESSMENT — PAIN SCALES - GENERAL: PAINLEVEL_OUTOF10: 5

## 2024-04-21 ASSESSMENT — PAIN DESCRIPTION - LOCATION: LOCATION: HIP;NECK

## 2024-04-21 ASSESSMENT — PAIN - FUNCTIONAL ASSESSMENT: PAIN_FUNCTIONAL_ASSESSMENT: 0-10

## 2024-04-21 ASSESSMENT — PAIN DESCRIPTION - ORIENTATION: ORIENTATION: RIGHT

## 2024-04-21 ASSESSMENT — PAIN DESCRIPTION - DESCRIPTORS: DESCRIPTORS: ACHING

## 2024-04-21 NOTE — ED NOTES
Pt discharged by.ANN MARIE Delacruz. Pt verbalized understanding of follow up appointments, Rx instructions, and results of care. No further questions or concerns at this time. Pt out of ED ambulatory accompanied by self. Work note provided.
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
## 2024-04-23 ASSESSMENT — VISUAL ACUITY: OU: 1

## 2024-05-26 ENCOUNTER — HOSPITAL ENCOUNTER (OUTPATIENT)
Facility: HOSPITAL | Age: 49
Discharge: HOME | End: 2024-05-29
Payer: COMMERCIAL

## 2024-05-26 DIAGNOSIS — G35 MULTIPLE SCLEROSIS (HCC): ICD-10-CM

## 2024-05-26 PROCEDURE — A9579 GAD-BASE MR CONTRAST NOS,1ML: HCPCS | Performed by: PSYCHIATRY & NEUROLOGY

## 2024-05-26 PROCEDURE — 6360000004 HC RX CONTRAST MEDICATION: Performed by: PSYCHIATRY & NEUROLOGY

## 2024-05-26 PROCEDURE — 72156 MRI NECK SPINE W/O & W/DYE: CPT

## 2024-05-26 RX ADMIN — GADOTERIDOL 13 ML: 279.3 INJECTION, SOLUTION INTRAVENOUS at 08:51

## 2024-06-03 DIAGNOSIS — G35 MULTIPLE SCLEROSIS (HCC): Primary | ICD-10-CM

## 2024-06-03 RX ORDER — DIMETHYL FUMARATE 240 MG/1
CAPSULE ORAL
Qty: 60 CAPSULE | Refills: 8 | OUTPATIENT
Start: 2024-06-03

## 2024-06-03 RX ORDER — DIMETHYL FUMARATE 240 MG/1
240 CAPSULE ORAL 2 TIMES DAILY
Qty: 60 CAPSULE | Refills: 11 | Status: ACTIVE | OUTPATIENT
Start: 2024-06-03

## 2024-06-03 NOTE — TELEPHONE ENCOUNTER
Nisa kennedy/ [facility] called with a refill request for Pt's dimethyl fumarate (TECFIDERA) 240 MG delayed release capsule. States that Pt is expecting delivery of this medication tomorrow so they requested this be expedited.     [facility] - [place], [place] - [address] - P [phone] - F [phone]

## 2024-07-16 ENCOUNTER — OFFICE VISIT (OUTPATIENT)
Age: 49
End: 2024-07-16
Payer: COMMERCIAL

## 2024-07-16 VITALS
RESPIRATION RATE: 16 BRPM | HEART RATE: 100 BPM | OXYGEN SATURATION: 98 % | TEMPERATURE: 98.9 F | DIASTOLIC BLOOD PRESSURE: 88 MMHG | BODY MASS INDEX: 27.82 KG/M2 | WEIGHT: 151.2 LBS | SYSTOLIC BLOOD PRESSURE: 138 MMHG | HEIGHT: 62 IN

## 2024-07-16 DIAGNOSIS — G35 MULTIPLE SCLEROSIS (HCC): Primary | ICD-10-CM

## 2024-07-16 DIAGNOSIS — Z11.59 ENCOUNTER FOR SCREENING FOR OTHER VIRAL DISEASES: ICD-10-CM

## 2024-07-16 DIAGNOSIS — R53.83 OTHER FATIGUE: ICD-10-CM

## 2024-07-16 DIAGNOSIS — G35 MULTIPLE SCLEROSIS (HCC): ICD-10-CM

## 2024-07-16 DIAGNOSIS — E55.9 VITAMIN D DEFICIENCY: ICD-10-CM

## 2024-07-16 DIAGNOSIS — N31.9 NEUROGENIC BLADDER: ICD-10-CM

## 2024-07-16 DIAGNOSIS — G35 MULTIPLE SCLEROSIS EXACERBATION (HCC): ICD-10-CM

## 2024-07-16 PROCEDURE — 99214 OFFICE O/P EST MOD 30 MIN: CPT | Performed by: PSYCHIATRY & NEUROLOGY

## 2024-07-16 ASSESSMENT — PATIENT HEALTH QUESTIONNAIRE - PHQ9
1. LITTLE INTEREST OR PLEASURE IN DOING THINGS: NOT AT ALL
SUM OF ALL RESPONSES TO PHQ QUESTIONS 1-9: 0
2. FEELING DOWN, DEPRESSED OR HOPELESS: NOT AT ALL
SUM OF ALL RESPONSES TO PHQ9 QUESTIONS 1 & 2: 0
SUM OF ALL RESPONSES TO PHQ QUESTIONS 1-9: 0

## 2024-07-16 NOTE — PATIENT INSTRUCTIONS
As a reminder:   Please come to your appointment 15 minutes before your office appointment.  This way, you can get checked in at the  and checked in by the nursing staff so you have the full allotment of time with your provider for your visit.  Please bring an up-to-date and accurate list of all your medications.  Or bring all your active prescription bottles with you at the time of your office visit and this includes over-the-counter medications so we can make sure that your medication list is up-to-date.  If you are scheduled for a virtual visit, please be aware that the  will need to check you in and usually the day before to verify insurance and collect co-pays as appropriate.  Please be prepared for the second call which will be from the nurse to go over your medications and any other vital information.  This will probably be done 30 minutes prior to your visit.  The reason why we do this early is that you can get the full benefit of your appointment time with your provider.  Finally you will be given the link for your virtual visit please click into your link 10 minutes prior to your appointment and please wait patiently for the provider to join you        As per discussion  From a neurologic perspective you are doing really well continue on all your medications and treatments unchanged.    I have ordered blood work and I will go over those results via [employer] and anything differently we need to do based on those results if anything.    At this point I do not want you to worry about getting the thoracic spine MRI scan done but again once I see your blood work results I may ask you to get that if needed.    In the meantime I want you to stretch on more of a regular basis 4 to 5 days a week and we talked about ways to get that completed    Continue to stay well-hydrated especially in this heat and please avoid going out in the heat as it will make your MS symptoms worse          General

## 2024-07-16 NOTE — ASSESSMENT & PLAN NOTE
Seemingly well-controlled patient continues on Detrol 2 mg/day and she continues to behavioral interventions and Kegel exercises regularly    If this should become more problematic we may need to refer her to urogyn or urology but for now things seem stable we will continue to monitor she is to continue on treatments as above

## 2024-07-16 NOTE — PROGRESS NOTES
dysfunction  Cognition  Mood          Other  Thyroid nodule  Patient seen by ENT per patient report and states that it is benign and needs no further follow-up      Pertinent diagnostic data    Stratify JCV Index Value   Date Value Ref Range Status   06/16/2023 0.13  Final     JCV Antibody by Inhibition   Date Value Ref Range Status   06/16/2023 Not indicated.  Final     JCV AB Interpretation   Date Value Ref Range Status   06/16/2023 Comment  Final     Comment:     Positive: Antibodies to NADIYA virus (JCV) detected            indicating the patient has been exposed            to JCV at an undetermined time  Negative: Antibodies to JCV not detected                Stratify JCV Index Value   Date Value Ref Range Status   06/16/2023 0.13  Final     JCV Antibody by Inhibition   Date Value Ref Range Status   06/16/2023 Not indicated.  Final     JCV AB Interpretation   Date Value Ref Range Status   06/16/2023 Comment  Final     Comment:     Positive: Antibodies to NADIYA virus (JCV) detected            indicating the patient has been exposed            to JCV at an undetermined time  Negative: Antibodies to JCV not detected          Lab Results   Component Value Date/Time    JCVAB Negative 06/16/2023 09:02 AM         JCV Ab:  INDEX VALUE   Date Value Ref Range Status   08/09/2022 0.26  Final         No visits with results within 1 Month(s) from this visit.   Latest known visit with results is:   Orders Only on 06/16/2023   Component Date Value Ref Range Status    WBC 06/16/2023 6.6  3.4 - 10.8 x10E3/uL Final    RBC 06/16/2023 4.27  3.77 - 5.28 x10E6/uL Final    Hemoglobin 06/16/2023 13.8  11.1 - 15.9 g/dL Final    Hematocrit 06/16/2023 40.4  34.0 - 46.6 % Final    MCV 06/16/2023 95  79 - 97 fL Final    MCH 06/16/2023 32.3  26.6 - 33.0 pg Final    MCHC 06/16/2023 34.2  31.5 - 35.7 g/dL Final    RDW 06/16/2023 12.4  11.7 - 15.4 % Final    Platelets 06/16/2023 250  150 - 450 x10E3/uL Final    Neutrophils % 06/16/2023 81  Not Estab.

## 2024-07-16 NOTE — ASSESSMENT & PLAN NOTE
Managing behaviorally at this time we will continue to monitor  Patient has been encouraged to continue to keep all of her other comorbid conditions under control such as anemia [she presently is maintained on iron]  And patient has been encouraged to make sure she stays out of the summer heat and to stay well-hydrated

## 2024-07-16 NOTE — ASSESSMENT & PLAN NOTE
Patient is stable and doing well from a clinical perspective and her most recent MRI scans from May 2024 show radiographic stability.     Continue on dimethyl fumarate 240 mg twice daily  She is due for therapeutic blood work and that has been ordered today she is not needing any additional MRIs at this time     MRI scan results and blood work results were reviewed at today's office visit    Patient has been encouraged to continue to eat healthy stay well-hydrated exercise and stretch on a regular basis to help promote general health as well as neurologic health

## 2024-07-16 NOTE — ASSESSMENT & PLAN NOTE
Patient has been stable since she had switched to dimethyl fumarate.  MRI scans remained stable we will continue to monitor over time

## 2024-07-17 LAB
25(OH)D3 SERPL-MCNC: 71.3 NG/ML (ref 30–100)
ALBUMIN SERPL-MCNC: 4.1 G/DL (ref 3.5–5)
ALBUMIN/GLOB SERPL: 1.2 (ref 1.1–2.2)
ALP SERPL-CCNC: 58 U/L (ref 45–117)
ALT SERPL-CCNC: 16 U/L (ref 12–78)
ANION GAP SERPL CALC-SCNC: 7 MMOL/L (ref 5–15)
AST SERPL-CCNC: 10 U/L (ref 15–37)
BASOPHILS # BLD: 0.1 K/UL (ref 0–0.1)
BASOPHILS NFR BLD: 1 % (ref 0–1)
BILIRUB SERPL-MCNC: 0.6 MG/DL (ref 0.2–1)
BUN SERPL-MCNC: 12 MG/DL (ref 6–20)
BUN/CREAT SERPL: 20 (ref 12–20)
CALCIUM SERPL-MCNC: 9.5 MG/DL (ref 8.5–10.1)
CHLORIDE SERPL-SCNC: 107 MMOL/L (ref 97–108)
CO2 SERPL-SCNC: 28 MMOL/L (ref 21–32)
CREAT SERPL-MCNC: 0.6 MG/DL (ref 0.55–1.02)
DIFFERENTIAL METHOD BLD: ABNORMAL
EOSINOPHIL # BLD: 0.1 K/UL (ref 0–0.4)
EOSINOPHIL NFR BLD: 2 % (ref 0–7)
ERYTHROCYTE [DISTWIDTH] IN BLOOD BY AUTOMATED COUNT: 13 % (ref 11.5–14.5)
GLOBULIN SER CALC-MCNC: 3.5 G/DL (ref 2–4)
GLUCOSE SERPL-MCNC: 99 MG/DL (ref 65–100)
HCT VFR BLD AUTO: 38.4 % (ref 35–47)
HGB BLD-MCNC: 13.3 G/DL (ref 11.5–16)
IMM GRANULOCYTES # BLD AUTO: 0 K/UL (ref 0–0.04)
IMM GRANULOCYTES NFR BLD AUTO: 0 % (ref 0–0.5)
LYMPHOCYTES # BLD: 0.7 K/UL (ref 0.8–3.5)
LYMPHOCYTES NFR BLD: 12 % (ref 12–49)
MCH RBC QN AUTO: 32.2 PG (ref 26–34)
MCHC RBC AUTO-ENTMCNC: 34.6 G/DL (ref 30–36.5)
MCV RBC AUTO: 93 FL (ref 80–99)
MONOCYTES # BLD: 0.4 K/UL (ref 0–1)
MONOCYTES NFR BLD: 7 % (ref 5–13)
NEUTS SEG # BLD: 4.2 K/UL (ref 1.8–8)
NEUTS SEG NFR BLD: 78 % (ref 32–75)
NRBC # BLD: 0 K/UL (ref 0–0.01)
NRBC BLD-RTO: 0 PER 100 WBC
PLATELET # BLD AUTO: 232 K/UL (ref 150–400)
PMV BLD AUTO: 11.9 FL (ref 8.9–12.9)
POTASSIUM SERPL-SCNC: 3.4 MMOL/L (ref 3.5–5.1)
PROT SERPL-MCNC: 7.6 G/DL (ref 6.4–8.2)
RBC # BLD AUTO: 4.13 M/UL (ref 3.8–5.2)
RBC MORPH BLD: ABNORMAL
SODIUM SERPL-SCNC: 142 MMOL/L (ref 136–145)
WBC # BLD AUTO: 5.5 K/UL (ref 3.6–11)

## 2024-07-22 LAB
Lab: NORMAL
Lab: NORMAL
REFERENCE LAB: NORMAL

## 2024-07-26 LAB
INDEX VALUE: 0.13
JC VIRUS ANTIBODY: NEGATIVE
JC VIRUS INTERPRETATION: NORMAL
JCV AB INTERPRETATION: NORMAL
JCV ANTIBODY BY INHIBITION: NORMAL

## 2025-02-07 ENCOUNTER — OFFICE VISIT (OUTPATIENT)
Age: 50
End: 2025-02-07
Payer: COMMERCIAL

## 2025-02-07 VITALS
OXYGEN SATURATION: 99 % | DIASTOLIC BLOOD PRESSURE: 84 MMHG | WEIGHT: 146 LBS | SYSTOLIC BLOOD PRESSURE: 126 MMHG | HEART RATE: 90 BPM | BODY MASS INDEX: 26.7 KG/M2

## 2025-02-07 DIAGNOSIS — Z11.59 ENCOUNTER FOR SCREENING FOR OTHER VIRAL DISEASES: ICD-10-CM

## 2025-02-07 DIAGNOSIS — G35 MULTIPLE SCLEROSIS (HCC): Primary | ICD-10-CM

## 2025-02-07 DIAGNOSIS — N31.9 NEUROGENIC BLADDER: ICD-10-CM

## 2025-02-07 DIAGNOSIS — G35 MULTIPLE SCLEROSIS (HCC): ICD-10-CM

## 2025-02-07 PROCEDURE — 99214 OFFICE O/P EST MOD 30 MIN: CPT | Performed by: PSYCHIATRY & NEUROLOGY

## 2025-02-07 RX ORDER — NORGESTIMATE AND ETHINYL ESTRADIOL 7DAYSX3 28
1 KIT ORAL DAILY
COMMUNITY
Start: 2024-12-04

## 2025-02-07 ASSESSMENT — PATIENT HEALTH QUESTIONNAIRE - PHQ9
SUM OF ALL RESPONSES TO PHQ QUESTIONS 1-9: 0
SUM OF ALL RESPONSES TO PHQ QUESTIONS 1-9: 0
2. FEELING DOWN, DEPRESSED OR HOPELESS: NOT AT ALL
SUM OF ALL RESPONSES TO PHQ QUESTIONS 1-9: 0
SUM OF ALL RESPONSES TO PHQ9 QUESTIONS 1 & 2: 0
SUM OF ALL RESPONSES TO PHQ QUESTIONS 1-9: 0
1. LITTLE INTEREST OR PLEASURE IN DOING THINGS: NOT AT ALL

## 2025-02-07 NOTE — PATIENT INSTRUCTIONS
As a reminder:   Please come to your appointment 15 minutes before your office appointment.  This way, you can get checked in at the  and checked in by the nursing staff so you have the full allotment of time with your provider for your visit.  Please bring an up-to-date and accurate list of all your medications.  Or bring all your active prescription bottles with you at the time of your office visit and this includes over-the-counter medications so we can make sure that your medication list is up-to-date.  If you are scheduled for a virtual visit, please be aware that the  will need to check you in and usually the day before to verify insurance and collect co-pays as appropriate.  Please be prepared for the second call which will be from the nurse to go over your medications and any other vital information.  This will probably be done 30 minutes prior to your visit.  The reason why we do this early is that you can get the full benefit of your appointment time with your provider.  Finally you will be given the link for your virtual visit please click into your link 10 minutes prior to your appointment and please wait patiently for the provider to join you        As per discussion  Patient Information:  Name: Merrick Bui  Age: 49  Medical History: Multiple sclerosis, bladder incontinence    Reason for Visit:  Merrick Bui visited for a follow-up on her multiple sclerosis and to discuss ongoing bladder issues. She reports stability in her multiple sclerosis with no recent flare-ups or breakthrough symptoms. However, her bladder issues are not adequately managed by Kegel exercises and Ditropan.    Clinical Findings:  - General appearance: Alert, well-kempt, well-developed, and well-nourished.  - HEENT: No evidence of trauma, full range of motion in head and neck, no tenderness.  - Respiratory: Lungs are clear.  - Cardiovascular: Normal heart sounds, no additional sounds, no carotid bruit, extremities

## 2025-02-07 NOTE — PROGRESS NOTES
Since last visit patient has been doing well  Handling all meds   No verbalized concerns   
perpendicular to the corpus callosum. There is no new  postcontrast enhancement.    There is no new diffusion obstruction. Otherwise;  There is no abnormal parenchymal enhancement. There is no abnormal meningeal  enhancement demonstrated. The brain architecture is normal. There is no evidence  of midline shift or mass-effect. The ventricles are normal in size, position and  configuration.  There are no extra-axial fluid collections. Major intracranial  vascular flow-voids are unremarkable. The orbits are grossly symmetric. Dural  venous sinuses are grossly unremarkable. There is no Chiari or syrinx. Pituitary  and infundibulum grossly unremarkable. Cerebellopontine angles are unremarkable.  No skull base mass is identified. Cavernous sinuses are symmetric. The mastoid  air cells and are well pneumatized and clear.    Impression  Stable predominantly supratentorial demyelinating disease burden. No evidence of  interval progression or active demyelination.  No intracranial mass, hemorrhage or evidence of acute infarction.      MRI THORACIC SPINE W WO CONTRAST 12/21/2021    Narrative  EXAM: MRI CERV SPINE W WO CONT, MRI THORAC SPINE W WO CONT    INDICATION: Right arm weakness and neck/back pain. Chronic multiple sclerosis.  Multiple sclerosis / Can use 1.5T or 3.0T  scanner and 3 MM cuts    COMPARISON: MRI cervical spine on 7/8/2019. MRI thoracic spine on 9/14/2017.    TECHNIQUE: MR imaging of the cervical spine and thoracic was performed using the  following sequences: sagittal T1, T2, STIR;  axial T2, T1 prior to and following  contrast administration (2 separate studies reported together).    CONTRAST: 13 mL of ProHance.    FINDINGS: MRI cervical spine:    2 mm posterior subluxation of C5-6 and C6-7 is new. Vertebral body heights are  maintained. Bone island in the C6 vertebral body is unchanged. Mild multilevel  facet arthrosis in the cervical spine. Disc desiccation is increased at C5-6 and  C6-7. No

## 2025-02-07 NOTE — ASSESSMENT & PLAN NOTE
Patient remained stable clinically and radiographically.  She is to continue on dimethyl fumarate 240 mg twice daily she is tolerating that well without difficulty she is due for surveillance blood work and that has been ordered she does not need MRIs at this time.

## 2025-02-07 NOTE — ASSESSMENT & PLAN NOTE
No longer well-controlled she continues on Detrol 2 mg a day and Kegel exercises and behavioral interventions but she is having more problems.  She has an appointment with her gynecologist I believe tomorrow so we will see what happens they are and I have recommended possibly seeing urogynecology and I have asked her to ask her gynecologist about this.    She will get back to me regarding the referral if needed

## 2025-02-08 LAB
ALBUMIN SERPL-MCNC: 4.2 G/DL (ref 3.5–5)
ALBUMIN/GLOB SERPL: 1.1 (ref 1.1–2.2)
ALP SERPL-CCNC: 72 U/L (ref 45–117)
ALT SERPL-CCNC: 16 U/L (ref 12–78)
ANION GAP SERPL CALC-SCNC: 3 MMOL/L (ref 2–12)
AST SERPL-CCNC: 10 U/L (ref 15–37)
BASOPHILS # BLD: 0.05 K/UL (ref 0–0.1)
BASOPHILS NFR BLD: 1.2 % (ref 0–1)
BILIRUB SERPL-MCNC: 0.5 MG/DL (ref 0.2–1)
BUN SERPL-MCNC: 14 MG/DL (ref 6–20)
BUN/CREAT SERPL: 24 (ref 12–20)
CALCIUM SERPL-MCNC: 9.7 MG/DL (ref 8.5–10.1)
CHLORIDE SERPL-SCNC: 104 MMOL/L (ref 97–108)
CO2 SERPL-SCNC: 29 MMOL/L (ref 21–32)
CREAT SERPL-MCNC: 0.59 MG/DL (ref 0.55–1.02)
DIFFERENTIAL METHOD BLD: ABNORMAL
EOSINOPHIL # BLD: 0.08 K/UL (ref 0–0.4)
EOSINOPHIL NFR BLD: 1.9 % (ref 0–7)
ERYTHROCYTE [DISTWIDTH] IN BLOOD BY AUTOMATED COUNT: 12.7 % (ref 11.5–14.5)
GLOBULIN SER CALC-MCNC: 3.7 G/DL (ref 2–4)
GLUCOSE SERPL-MCNC: 99 MG/DL (ref 65–100)
HCT VFR BLD AUTO: 41.2 % (ref 35–47)
HGB BLD-MCNC: 13.4 G/DL (ref 11.5–16)
IMM GRANULOCYTES # BLD AUTO: 0.01 K/UL (ref 0–0.04)
IMM GRANULOCYTES NFR BLD AUTO: 0.2 % (ref 0–0.5)
LYMPHOCYTES # BLD: 0.67 K/UL (ref 0.8–3.5)
LYMPHOCYTES NFR BLD: 16 % (ref 12–49)
MCH RBC QN AUTO: 30.7 PG (ref 26–34)
MCHC RBC AUTO-ENTMCNC: 32.5 G/DL (ref 30–36.5)
MCV RBC AUTO: 94.5 FL (ref 80–99)
MONOCYTES # BLD: 0.45 K/UL (ref 0–1)
MONOCYTES NFR BLD: 10.7 % (ref 5–13)
NEUTS SEG # BLD: 2.94 K/UL (ref 1.8–8)
NEUTS SEG NFR BLD: 70 % (ref 32–75)
NRBC # BLD: 0 K/UL (ref 0–0.01)
NRBC BLD-RTO: 0 PER 100 WBC
PLATELET # BLD AUTO: 283 K/UL (ref 150–400)
PMV BLD AUTO: 11.9 FL (ref 8.9–12.9)
POTASSIUM SERPL-SCNC: 4.2 MMOL/L (ref 3.5–5.1)
PROT SERPL-MCNC: 7.9 G/DL (ref 6.4–8.2)
RBC # BLD AUTO: 4.36 M/UL (ref 3.8–5.2)
RBC MORPH BLD: ABNORMAL
SODIUM SERPL-SCNC: 136 MMOL/L (ref 136–145)
WBC # BLD AUTO: 4.2 K/UL (ref 3.6–11)

## 2025-02-10 ENCOUNTER — TRANSCRIBE ORDERS (OUTPATIENT)
Facility: HOSPITAL | Age: 50
End: 2025-02-10

## 2025-02-10 DIAGNOSIS — Z12.31 ENCOUNTER FOR SCREENING MAMMOGRAM FOR MALIGNANT NEOPLASM OF BREAST: Primary | ICD-10-CM

## 2025-02-10 LAB
NEUROFILAMENT LIGHT CHAIN: 1.87 PG/ML (ref 0–2.13)
NFL, SERUM Z SCORE: 1.6 PG/ML

## 2025-02-17 LAB
INDEX VALUE: 0.2
JC VIRUS ANTIBODY: NORMAL
JC VIRUS INTERPRETATION: NORMAL
JCV AB INTERPRETATION: NORMAL
JCV ANTIBODY BY INHIBITION: NEGATIVE

## 2025-03-09 DIAGNOSIS — N31.9 NEUROGENIC BLADDER: ICD-10-CM

## 2025-03-11 RX ORDER — TOLTERODINE 2 MG/1
2 CAPSULE, EXTENDED RELEASE ORAL DAILY
Qty: 90 CAPSULE | Refills: 3 | Status: SHIPPED | OUTPATIENT
Start: 2025-03-11

## 2025-05-15 DIAGNOSIS — G35 MULTIPLE SCLEROSIS (HCC): ICD-10-CM

## 2025-05-19 RX ORDER — DIMETHYL FUMARATE 240 MG/1
240 CAPSULE ORAL 2 TIMES DAILY
Qty: 60 CAPSULE | Refills: 11 | Status: ACTIVE | OUTPATIENT
Start: 2025-05-19